# Patient Record
Sex: MALE | Race: WHITE | HISPANIC OR LATINO
[De-identification: names, ages, dates, MRNs, and addresses within clinical notes are randomized per-mention and may not be internally consistent; named-entity substitution may affect disease eponyms.]

---

## 2017-01-20 ENCOUNTER — RECORD ABSTRACTING (OUTPATIENT)
Age: 72
End: 2017-01-20

## 2017-02-17 ENCOUNTER — RESULT REVIEW (OUTPATIENT)
Age: 72
End: 2017-02-17

## 2017-02-17 ENCOUNTER — APPOINTMENT (OUTPATIENT)
Dept: INTERNAL MEDICINE | Facility: CLINIC | Age: 72
End: 2017-02-17

## 2017-02-17 VITALS
HEIGHT: 67 IN | HEART RATE: 68 BPM | BODY MASS INDEX: 26.84 KG/M2 | WEIGHT: 171 LBS | SYSTOLIC BLOOD PRESSURE: 151 MMHG | DIASTOLIC BLOOD PRESSURE: 82 MMHG

## 2017-02-17 DIAGNOSIS — A04.8 OTHER SPECIFIED BACTERIAL INTESTINAL INFECTIONS: ICD-10-CM

## 2017-02-17 DIAGNOSIS — I73.9 PERIPHERAL VASCULAR DISEASE, UNSPECIFIED: ICD-10-CM

## 2017-02-18 ENCOUNTER — RESULT REVIEW (OUTPATIENT)
Age: 72
End: 2017-02-18

## 2017-02-21 ENCOUNTER — APPOINTMENT (OUTPATIENT)
Dept: NEUROLOGY | Facility: CLINIC | Age: 72
End: 2017-02-21

## 2017-02-21 VITALS
WEIGHT: 171 LBS | BODY MASS INDEX: 26.84 KG/M2 | HEART RATE: 76 BPM | SYSTOLIC BLOOD PRESSURE: 130 MMHG | HEIGHT: 67 IN | DIASTOLIC BLOOD PRESSURE: 80 MMHG

## 2017-02-21 LAB
25(OH)D3 SERPL-MCNC: 24 NG/ML
ANION GAP SERPL CALC-SCNC: 7 MEQ/L
BUN SERPL-MCNC: 14 MG/DL
BUN/CREAT SERPL: 16.5 %
CALCIUM SERPL-MCNC: 9.5 MG/DL
CHLORIDE SERPL-SCNC: 105 MEQ/L
CHOLEST SERPL-MCNC: 238 MG/DL
CO2 SERPL-SCNC: 27 MEQ/L
CREAT SERPL-MCNC: 0.85 MG/DL
ESTIMATED AVERGAGE GLUCOSE (NORTH): 97 MG/DL
GFR SERPL CREATININE-BSD FRML MDRD: 89
GLUCOSE SERPL-MCNC: 89 MG/DL
HBA1C MFR BLD: 5 %
HDLC SERPL-MCNC: 40 MG/DL
HDLC SERPL: 5.95
LDLC SERPL DIRECT ASSAY-MCNC: 172 MG/DL
POTASSIUM SERPL-SCNC: 4.9 MMOL/L
SODIUM SERPL-SCNC: 139 MEQ/L
T4 FREE SERPL-MCNC: 1.1 NG/DL
TRIGL SERPL-MCNC: 131 MG/DL
TSH SERPL DL<=0.005 MIU/L-ACNC: 1.32 UIU/ML
VITAMIN D2 SERPL-MCNC: <4 NG/ML
VITAMIN D3 SERPL-MCNC: 24 NG/ML
VLDLC SERPL-MCNC: 26 MG/DL

## 2017-02-22 ENCOUNTER — APPOINTMENT (OUTPATIENT)
Dept: ORTHOPEDIC SURGERY | Facility: CLINIC | Age: 72
End: 2017-02-22

## 2017-02-24 ENCOUNTER — APPOINTMENT (OUTPATIENT)
Dept: INTERNAL MEDICINE | Facility: CLINIC | Age: 72
End: 2017-02-24

## 2017-02-24 ENCOUNTER — OTHER (OUTPATIENT)
Age: 72
End: 2017-02-24

## 2017-02-24 VITALS
HEART RATE: 73 BPM | SYSTOLIC BLOOD PRESSURE: 114 MMHG | DIASTOLIC BLOOD PRESSURE: 72 MMHG | WEIGHT: 171 LBS | HEIGHT: 67 IN | BODY MASS INDEX: 26.84 KG/M2

## 2017-03-08 ENCOUNTER — APPOINTMENT (OUTPATIENT)
Dept: ORTHOPEDIC SURGERY | Facility: CLINIC | Age: 72
End: 2017-03-08

## 2017-03-22 ENCOUNTER — APPOINTMENT (OUTPATIENT)
Dept: ORTHOPEDIC SURGERY | Facility: CLINIC | Age: 72
End: 2017-03-22

## 2017-04-07 ENCOUNTER — APPOINTMENT (OUTPATIENT)
Dept: INTERNAL MEDICINE | Facility: CLINIC | Age: 72
End: 2017-04-07

## 2017-04-07 VITALS
HEART RATE: 64 BPM | SYSTOLIC BLOOD PRESSURE: 123 MMHG | BODY MASS INDEX: 27.31 KG/M2 | WEIGHT: 174 LBS | DIASTOLIC BLOOD PRESSURE: 74 MMHG | HEIGHT: 67 IN

## 2017-04-25 ENCOUNTER — APPOINTMENT (OUTPATIENT)
Dept: INTERNAL MEDICINE | Facility: CLINIC | Age: 72
End: 2017-04-25

## 2017-05-03 ENCOUNTER — APPOINTMENT (OUTPATIENT)
Dept: ORTHOPEDIC SURGERY | Facility: CLINIC | Age: 72
End: 2017-05-03

## 2017-06-12 ENCOUNTER — OUTPATIENT (OUTPATIENT)
Dept: OUTPATIENT SERVICES | Facility: HOSPITAL | Age: 72
LOS: 1 days | Discharge: HOME | End: 2017-06-12

## 2017-06-12 DIAGNOSIS — M21.379 FOOT DROP, UNSPECIFIED FOOT: ICD-10-CM

## 2017-06-21 ENCOUNTER — OUTPATIENT (OUTPATIENT)
Dept: OUTPATIENT SERVICES | Facility: HOSPITAL | Age: 72
LOS: 1 days | Discharge: HOME | End: 2017-06-21

## 2017-06-21 ENCOUNTER — APPOINTMENT (OUTPATIENT)
Dept: ORTHOPEDIC SURGERY | Facility: CLINIC | Age: 72
End: 2017-06-21

## 2017-06-21 DIAGNOSIS — M21.379 FOOT DROP, UNSPECIFIED FOOT: ICD-10-CM

## 2017-06-27 ENCOUNTER — APPOINTMENT (OUTPATIENT)
Dept: NEUROLOGY | Facility: CLINIC | Age: 72
End: 2017-06-27

## 2017-06-28 DIAGNOSIS — H52.7 UNSPECIFIED DISORDER OF REFRACTION: ICD-10-CM

## 2017-06-28 DIAGNOSIS — H25.813 COMBINED FORMS OF AGE-RELATED CATARACT, BILATERAL: ICD-10-CM

## 2017-06-28 DIAGNOSIS — M25.579 PAIN IN UNSPECIFIED ANKLE AND JOINTS OF UNSPECIFIED FOOT: ICD-10-CM

## 2017-09-13 ENCOUNTER — OUTPATIENT (OUTPATIENT)
Dept: OUTPATIENT SERVICES | Facility: HOSPITAL | Age: 72
LOS: 1 days | Discharge: HOME | End: 2017-09-13

## 2017-09-13 ENCOUNTER — APPOINTMENT (OUTPATIENT)
Dept: INTERNAL MEDICINE | Facility: CLINIC | Age: 72
End: 2017-09-13

## 2017-09-13 VITALS
WEIGHT: 188 LBS | HEART RATE: 73 BPM | BODY MASS INDEX: 30.22 KG/M2 | HEIGHT: 66 IN | DIASTOLIC BLOOD PRESSURE: 77 MMHG | SYSTOLIC BLOOD PRESSURE: 117 MMHG

## 2017-09-13 DIAGNOSIS — M21.379 FOOT DROP, UNSPECIFIED FOOT: ICD-10-CM

## 2017-09-13 DIAGNOSIS — F32.2 MAJOR DEPRESSIVE DISORDER, SINGLE EPISODE, SEVERE W/OUT PSYCHOTIC FEATURES: ICD-10-CM

## 2017-09-13 DIAGNOSIS — M89.9 DISORDER OF BONE, UNSPECIFIED: ICD-10-CM

## 2017-09-13 DIAGNOSIS — B02.29 OTHER POSTHERPETIC NERVOUS SYSTEM INVOLVEMENT: ICD-10-CM

## 2017-09-14 ENCOUNTER — OUTPATIENT (OUTPATIENT)
Dept: OUTPATIENT SERVICES | Facility: HOSPITAL | Age: 72
LOS: 1 days | Discharge: HOME | End: 2017-09-14

## 2017-09-14 DIAGNOSIS — M25.511 PAIN IN RIGHT SHOULDER: ICD-10-CM

## 2017-09-14 DIAGNOSIS — M25.572 PAIN IN LEFT ANKLE AND JOINTS OF LEFT FOOT: ICD-10-CM

## 2017-09-14 DIAGNOSIS — F32.9 MAJOR DEPRESSIVE DISORDER, SINGLE EPISODE, UNSPECIFIED: ICD-10-CM

## 2017-09-14 DIAGNOSIS — F32.2 MAJOR DEPRESSIVE DISORDER, SINGLE EPISODE, SEVERE WITHOUT PSYCHOTIC FEATURES: ICD-10-CM

## 2017-09-14 DIAGNOSIS — E78.5 HYPERLIPIDEMIA, UNSPECIFIED: ICD-10-CM

## 2017-09-27 ENCOUNTER — APPOINTMENT (OUTPATIENT)
Dept: ORTHOPEDIC SURGERY | Facility: CLINIC | Age: 72
End: 2017-09-27

## 2017-09-27 ENCOUNTER — OUTPATIENT (OUTPATIENT)
Dept: OUTPATIENT SERVICES | Facility: HOSPITAL | Age: 72
LOS: 1 days | Discharge: HOME | End: 2017-09-27

## 2017-09-27 DIAGNOSIS — M21.379 FOOT DROP, UNSPECIFIED FOOT: ICD-10-CM

## 2017-10-03 ENCOUNTER — OUTPATIENT (OUTPATIENT)
Dept: OUTPATIENT SERVICES | Facility: HOSPITAL | Age: 72
LOS: 1 days | Discharge: HOME | End: 2017-10-03

## 2017-10-03 DIAGNOSIS — F33.1 MAJOR DEPRESSIVE DISORDER, RECURRENT, MODERATE: ICD-10-CM

## 2017-10-04 ENCOUNTER — OUTPATIENT (OUTPATIENT)
Dept: OUTPATIENT SERVICES | Facility: HOSPITAL | Age: 72
LOS: 1 days | Discharge: HOME | End: 2017-10-04

## 2017-10-04 DIAGNOSIS — H90.3 SENSORINEURAL HEARING LOSS, BILATERAL: ICD-10-CM

## 2017-10-04 DIAGNOSIS — M21.379 FOOT DROP, UNSPECIFIED FOOT: ICD-10-CM

## 2017-10-10 ENCOUNTER — OTHER (OUTPATIENT)
Age: 72
End: 2017-10-10

## 2017-10-10 ENCOUNTER — APPOINTMENT (OUTPATIENT)
Age: 72
End: 2017-10-10

## 2017-10-10 ENCOUNTER — APPOINTMENT (OUTPATIENT)
Dept: INTERNAL MEDICINE | Facility: CLINIC | Age: 72
End: 2017-10-10

## 2017-10-10 VITALS — WEIGHT: 185 LBS | BODY MASS INDEX: 29.86 KG/M2

## 2017-10-27 ENCOUNTER — OUTPATIENT (OUTPATIENT)
Dept: OUTPATIENT SERVICES | Facility: HOSPITAL | Age: 72
LOS: 1 days | Discharge: HOME | End: 2017-10-27

## 2017-10-27 DIAGNOSIS — M21.379 FOOT DROP, UNSPECIFIED FOOT: ICD-10-CM

## 2017-10-31 ENCOUNTER — OUTPATIENT (OUTPATIENT)
Dept: OUTPATIENT SERVICES | Facility: HOSPITAL | Age: 72
LOS: 1 days | Discharge: HOME | End: 2017-10-31

## 2017-10-31 DIAGNOSIS — H90.3 SENSORINEURAL HEARING LOSS, BILATERAL: ICD-10-CM

## 2017-10-31 DIAGNOSIS — M21.379 FOOT DROP, UNSPECIFIED FOOT: ICD-10-CM

## 2017-11-01 DIAGNOSIS — H01.001 UNSPECIFIED BLEPHARITIS RIGHT UPPER EYELID: ICD-10-CM

## 2017-11-01 DIAGNOSIS — H01.004 UNSPECIFIED BLEPHARITIS LEFT UPPER EYELID: ICD-10-CM

## 2017-11-01 DIAGNOSIS — H52.7 UNSPECIFIED DISORDER OF REFRACTION: ICD-10-CM

## 2017-11-01 DIAGNOSIS — H25.813 COMBINED FORMS OF AGE-RELATED CATARACT, BILATERAL: ICD-10-CM

## 2017-11-01 DIAGNOSIS — H01.005 UNSPECIFIED BLEPHARITIS LEFT LOWER EYELID: ICD-10-CM

## 2017-12-05 ENCOUNTER — OUTPATIENT (OUTPATIENT)
Dept: OUTPATIENT SERVICES | Facility: HOSPITAL | Age: 72
LOS: 1 days | Discharge: HOME | End: 2017-12-05

## 2017-12-05 DIAGNOSIS — M21.379 FOOT DROP, UNSPECIFIED FOOT: ICD-10-CM

## 2017-12-27 ENCOUNTER — APPOINTMENT (OUTPATIENT)
Dept: ORTHOPEDIC SURGERY | Facility: CLINIC | Age: 72
End: 2017-12-27

## 2018-01-11 ENCOUNTER — TRANSCRIPTION ENCOUNTER (OUTPATIENT)
Age: 73
End: 2018-01-11

## 2018-01-25 ENCOUNTER — APPOINTMENT (OUTPATIENT)
Dept: INTERNAL MEDICINE | Facility: CLINIC | Age: 73
End: 2018-01-25

## 2018-01-25 ENCOUNTER — OUTPATIENT (OUTPATIENT)
Dept: OUTPATIENT SERVICES | Facility: HOSPITAL | Age: 73
LOS: 1 days | Discharge: HOME | End: 2018-01-25

## 2018-01-25 VITALS
WEIGHT: 189 LBS | DIASTOLIC BLOOD PRESSURE: 82 MMHG | SYSTOLIC BLOOD PRESSURE: 150 MMHG | HEART RATE: 82 BPM | BODY MASS INDEX: 30.37 KG/M2 | HEIGHT: 66 IN

## 2018-01-25 DIAGNOSIS — F32.9 MAJOR DEPRESSIVE DISORDER, SINGLE EPISODE, UNSPECIFIED: ICD-10-CM

## 2018-01-25 DIAGNOSIS — E78.5 HYPERLIPIDEMIA, UNSPECIFIED: ICD-10-CM

## 2018-01-25 DIAGNOSIS — M25.572 PAIN IN LEFT ANKLE AND JOINTS OF LEFT FOOT: ICD-10-CM

## 2018-01-25 DIAGNOSIS — M25.511 PAIN IN RIGHT SHOULDER: ICD-10-CM

## 2018-01-25 DIAGNOSIS — T14.8XXA OTHER INJURY OF UNSPECIFIED BODY REGION, INITIAL ENCOUNTER: ICD-10-CM

## 2018-01-26 ENCOUNTER — OUTPATIENT (OUTPATIENT)
Dept: OUTPATIENT SERVICES | Facility: HOSPITAL | Age: 73
LOS: 1 days | Discharge: HOME | End: 2018-01-26

## 2018-01-26 DIAGNOSIS — H90.3 SENSORINEURAL HEARING LOSS, BILATERAL: ICD-10-CM

## 2018-01-30 ENCOUNTER — RESULT REVIEW (OUTPATIENT)
Age: 73
End: 2018-01-30

## 2018-01-30 LAB
25(OH)D3 SERPL-MCNC: 19 NG/ML
ALBUMIN SERPL-MCNC: 4.1 G/DL
ALBUMIN/GLOB SERPL: 1.58
ALP SERPL-CCNC: 92 IU/L
ALT SERPL-CCNC: 20 IU/L
ANION GAP SERPL CALC-SCNC: 7 MEQ/L
AST SERPL-CCNC: 21 IU/L
BASOPHILS # BLD: 0.05 TH/MM3
BASOPHILS NFR BLD: 0.6 %
BILIRUB SERPL-MCNC: 1 MG/DL
BUN SERPL-MCNC: 18 MG/DL
BUN/CREAT SERPL: 16.1 %
CALCIUM SERPL-MCNC: 9.1 MG/DL
CHLORIDE SERPL-SCNC: 110 MEQ/L
CHOLEST SERPL-MCNC: 269 MG/DL
CO2 SERPL-SCNC: 23 MEQ/L
CREAT SERPL-MCNC: 1.12 MG/DL
DIFFERENTIAL METHOD BLD: NORMAL
EOSINOPHIL # BLD: 0.13 TH/MM3
EOSINOPHIL NFR BLD: 1.5 %
ERYTHROCYTE [DISTWIDTH] IN BLOOD BY AUTOMATED COUNT: 15 %
ESTIMATED AVERGAGE GLUCOSE (NORTH): 100 MG/DL
GFR SERPL CREATININE-BSD FRML MDRD: 64
GLUCOSE SERPL-MCNC: 84 MG/DL
GRANULOCYTES # BLD: 5.77 TH/MM3
GRANULOCYTES NFR BLD: 64.3 %
HBA1C MFR BLD: 5.1 %
HCT VFR BLD AUTO: 55.9 %
HDLC SERPL-MCNC: 42 MG/DL
HDLC SERPL: 6.4
HGB BLD-MCNC: 18 G/DL
IMM GRANULOCYTES # BLD: 0.05 TH/MM3
IMM GRANULOCYTES NFR BLD: 0.6 %
LDLC SERPL DIRECT ASSAY-MCNC: 205 MG/DL
LYMPHOCYTES # BLD: 2.3 TH/MM3
LYMPHOCYTES NFR BLD: 25.7 %
MCH RBC QN AUTO: 29 PG
MCHC RBC AUTO-ENTMCNC: 32.9 G/DL
MCV RBC AUTO: 88.2 FL
MONOCYTES # BLD: 0.65 TH/MM3
MONOCYTES NFR BLD: 7.3 %
PLATELET # BLD: 167 TH/MM3
PMV BLD AUTO: 11.1 FL
POTASSIUM SERPL-SCNC: 4 MMOL/L
PROT SERPL-MCNC: 6.7 G/DL
RBC # BLD AUTO: 6.34 MIL/MM3
SODIUM SERPL-SCNC: 140 MEQ/L
TRIGL SERPL-MCNC: 88 MG/DL
TSH SERPL DL<=0.005 MIU/L-ACNC: 1.66 UIU/ML
VITAMIN D2 SERPL-MCNC: <4 NG/ML
VITAMIN D3 SERPL-MCNC: 19 NG/ML
VLDLC SERPL-MCNC: 17 MG/DL
WBC # BLD: 8.95 TH/MM3

## 2018-02-01 ENCOUNTER — OTHER (OUTPATIENT)
Age: 73
End: 2018-02-01

## 2018-02-01 ENCOUNTER — OUTPATIENT (OUTPATIENT)
Dept: OUTPATIENT SERVICES | Facility: HOSPITAL | Age: 73
LOS: 1 days | Discharge: HOME | End: 2018-02-01

## 2018-02-01 DIAGNOSIS — H90.3 SENSORINEURAL HEARING LOSS, BILATERAL: ICD-10-CM

## 2018-02-04 DIAGNOSIS — M21.379 FOOT DROP, UNSPECIFIED FOOT: ICD-10-CM

## 2018-02-22 ENCOUNTER — OUTPATIENT (OUTPATIENT)
Dept: OUTPATIENT SERVICES | Facility: HOSPITAL | Age: 73
LOS: 1 days | Discharge: HOME | End: 2018-02-22

## 2018-02-23 DIAGNOSIS — H90.3 SENSORINEURAL HEARING LOSS, BILATERAL: ICD-10-CM

## 2018-03-06 ENCOUNTER — OUTPATIENT (OUTPATIENT)
Dept: OUTPATIENT SERVICES | Facility: HOSPITAL | Age: 73
LOS: 1 days | Discharge: HOME | End: 2018-03-06

## 2018-03-06 DIAGNOSIS — S43.401A UNSPECIFIED SPRAIN OF RIGHT SHOULDER JOINT, INITIAL ENCOUNTER: ICD-10-CM

## 2018-03-08 ENCOUNTER — OUTPATIENT (OUTPATIENT)
Dept: OUTPATIENT SERVICES | Facility: HOSPITAL | Age: 73
LOS: 1 days | Discharge: HOME | End: 2018-03-08

## 2018-03-08 DIAGNOSIS — H90.3 SENSORINEURAL HEARING LOSS, BILATERAL: ICD-10-CM

## 2018-03-16 ENCOUNTER — OUTPATIENT (OUTPATIENT)
Dept: OUTPATIENT SERVICES | Facility: HOSPITAL | Age: 73
LOS: 1 days | Discharge: HOME | End: 2018-03-16

## 2018-03-16 ENCOUNTER — APPOINTMENT (OUTPATIENT)
Dept: INTERNAL MEDICINE | Facility: CLINIC | Age: 73
End: 2018-03-16

## 2018-03-16 VITALS
WEIGHT: 184 LBS | SYSTOLIC BLOOD PRESSURE: 143 MMHG | BODY MASS INDEX: 29.57 KG/M2 | HEIGHT: 66 IN | DIASTOLIC BLOOD PRESSURE: 84 MMHG | HEART RATE: 80 BPM

## 2018-03-16 RX ORDER — DICLOFENAC SODIUM 50 MG/1
50 TABLET, DELAYED RELEASE ORAL
Qty: 30 | Refills: 2 | Status: COMPLETED | COMMUNITY
Start: 2017-09-13 | End: 2018-01-16

## 2018-03-16 RX ORDER — PREGABALIN 150 MG/1
150 CAPSULE ORAL
Qty: 60 | Refills: 1 | Status: COMPLETED | COMMUNITY
Start: 2017-02-24 | End: 2017-06-23

## 2018-03-20 ENCOUNTER — OUTPATIENT (OUTPATIENT)
Dept: OUTPATIENT SERVICES | Facility: HOSPITAL | Age: 73
LOS: 1 days | Discharge: HOME | End: 2018-03-20

## 2018-03-20 DIAGNOSIS — M25.572 PAIN IN LEFT ANKLE AND JOINTS OF LEFT FOOT: ICD-10-CM

## 2018-03-20 DIAGNOSIS — Z63.4 DISAPPEARANCE AND DEATH OF FAMILY MEMBER: ICD-10-CM

## 2018-03-20 DIAGNOSIS — E78.5 HYPERLIPIDEMIA, UNSPECIFIED: ICD-10-CM

## 2018-03-20 DIAGNOSIS — F32.9 MAJOR DEPRESSIVE DISORDER, SINGLE EPISODE, UNSPECIFIED: ICD-10-CM

## 2018-03-20 DIAGNOSIS — Z56.0 UNEMPLOYMENT, UNSPECIFIED: ICD-10-CM

## 2018-03-20 SDOH — SOCIAL STABILITY - SOCIAL INSECURITY: DISSAPEARANCE AND DEATH OF FAMILY MEMBER: Z63.4

## 2018-03-20 SDOH — ECONOMIC STABILITY - INCOME SECURITY: UNEMPLOYMENT, UNSPECIFIED: Z56.0

## 2018-04-03 ENCOUNTER — OUTPATIENT (OUTPATIENT)
Dept: OUTPATIENT SERVICES | Facility: HOSPITAL | Age: 73
LOS: 1 days | Discharge: HOME | End: 2018-04-03

## 2018-04-03 DIAGNOSIS — F41.1 GENERALIZED ANXIETY DISORDER: ICD-10-CM

## 2018-04-03 DIAGNOSIS — F33.1 MAJOR DEPRESSIVE DISORDER, RECURRENT, MODERATE: ICD-10-CM

## 2018-04-04 ENCOUNTER — APPOINTMENT (OUTPATIENT)
Dept: ORTHOPEDIC SURGERY | Facility: CLINIC | Age: 73
End: 2018-04-04

## 2018-04-11 ENCOUNTER — APPOINTMENT (OUTPATIENT)
Dept: ORTHOPEDIC SURGERY | Facility: CLINIC | Age: 73
End: 2018-04-11

## 2018-04-11 ENCOUNTER — OUTPATIENT (OUTPATIENT)
Dept: OUTPATIENT SERVICES | Facility: HOSPITAL | Age: 73
LOS: 1 days | Discharge: HOME | End: 2018-04-11

## 2018-04-26 ENCOUNTER — OTHER (OUTPATIENT)
Age: 73
End: 2018-04-26

## 2018-05-10 ENCOUNTER — OUTPATIENT (OUTPATIENT)
Dept: OUTPATIENT SERVICES | Facility: HOSPITAL | Age: 73
LOS: 1 days | Discharge: HOME | End: 2018-05-10

## 2018-05-10 ENCOUNTER — APPOINTMENT (OUTPATIENT)
Dept: INTERNAL MEDICINE | Facility: CLINIC | Age: 73
End: 2018-05-10

## 2018-05-10 VITALS
BODY MASS INDEX: 30.05 KG/M2 | WEIGHT: 187 LBS | SYSTOLIC BLOOD PRESSURE: 135 MMHG | HEIGHT: 66 IN | DIASTOLIC BLOOD PRESSURE: 78 MMHG | HEART RATE: 93 BPM

## 2018-05-10 DIAGNOSIS — M25.511 PAIN IN RIGHT SHOULDER: ICD-10-CM

## 2018-05-10 DIAGNOSIS — F41.1 GENERALIZED ANXIETY DISORDER: ICD-10-CM

## 2018-05-10 DIAGNOSIS — M54.12 RADICULOPATHY, CERVICAL REGION: ICD-10-CM

## 2018-05-10 DIAGNOSIS — M25.579 PAIN IN UNSPECIFIED ANKLE AND JOINTS OF UNSPECIFIED FOOT: ICD-10-CM

## 2018-05-11 ENCOUNTER — OUTPATIENT (OUTPATIENT)
Dept: OUTPATIENT SERVICES | Facility: HOSPITAL | Age: 73
LOS: 1 days | Discharge: HOME | End: 2018-05-11

## 2018-05-11 ENCOUNTER — LABORATORY RESULT (OUTPATIENT)
Age: 73
End: 2018-05-11

## 2018-05-11 DIAGNOSIS — M25.511 PAIN IN RIGHT SHOULDER: ICD-10-CM

## 2018-05-11 DIAGNOSIS — F32.9 MAJOR DEPRESSIVE DISORDER, SINGLE EPISODE, UNSPECIFIED: ICD-10-CM

## 2018-05-11 DIAGNOSIS — M89.9 DISORDER OF BONE, UNSPECIFIED: ICD-10-CM

## 2018-05-11 DIAGNOSIS — E78.5 HYPERLIPIDEMIA, UNSPECIFIED: ICD-10-CM

## 2018-05-14 LAB
25(OH)D3 SERPL-MCNC: 39 NG/ML
ALBUMIN SERPL ELPH-MCNC: 4.5 G/DL
ALP BLD-CCNC: 106 U/L
ALT SERPL-CCNC: 16 U/L
ANION GAP SERPL CALC-SCNC: 14 MMOL/L
APPEARANCE: CLEAR
AST SERPL-CCNC: 19 U/L
BACTERIA: NEGATIVE
BILIRUB SERPL-MCNC: 1.2 MG/DL
BILIRUBIN URINE: NEGATIVE
BLOOD URINE: NEGATIVE
BUN SERPL-MCNC: 17 MG/DL
CALCIUM SERPL-MCNC: 9.5 MG/DL
CHLORIDE SERPL-SCNC: 104 MMOL/L
CHOLEST SERPL-MCNC: 132 MG/DL
CHOLEST/HDLC SERPL: 3 RATIO
CO2 SERPL-SCNC: 27 MMOL/L
COLOR: ABNORMAL
CREAT SERPL-MCNC: 1.1 MG/DL
GLUCOSE QUALITATIVE U: NEGATIVE MG/DL
GLUCOSE SERPL-MCNC: 85 MG/DL
HDLC SERPL-MCNC: 44 MG/DL
HYALINE CASTS: 0 /LPF
KETONES URINE: NEGATIVE
LDLC SERPL CALC-MCNC: 78 MG/DL
LEUKOCYTE ESTERASE URINE: NEGATIVE
MICROSCOPIC-UA: NORMAL
NITRITE URINE: NEGATIVE
PH URINE: 7.5
POTASSIUM SERPL-SCNC: 4.5 MMOL/L
PROT SERPL-MCNC: 7.3 G/DL
PROTEIN URINE: ABNORMAL MG/DL
PSA FREE FLD-MCNC: 25.6
PSA FREE SERPL-MCNC: 0.53 NG/ML
PSA SERPL-MCNC: 2.07 NG/ML
RED BLOOD CELLS URINE: 0 /HPF
SODIUM SERPL-SCNC: 145 MMOL/L
SPECIFIC GRAVITY URINE: 1.03
SQUAMOUS EPITHELIAL CELLS: 1 /HPF
TRIGL SERPL-MCNC: 80 MG/DL
UROBILINOGEN URINE: 1 MG/DL
WHITE BLOOD CELLS URINE: 0 /HPF

## 2018-06-26 ENCOUNTER — OUTPATIENT (OUTPATIENT)
Dept: OUTPATIENT SERVICES | Facility: HOSPITAL | Age: 73
LOS: 1 days | Discharge: HOME | End: 2018-06-26

## 2018-08-07 ENCOUNTER — OUTPATIENT (OUTPATIENT)
Dept: OUTPATIENT SERVICES | Facility: HOSPITAL | Age: 73
LOS: 1 days | Discharge: HOME | End: 2018-08-07

## 2018-08-16 ENCOUNTER — OUTPATIENT (OUTPATIENT)
Dept: OUTPATIENT SERVICES | Facility: HOSPITAL | Age: 73
LOS: 1 days | Discharge: HOME | End: 2018-08-16

## 2018-08-16 ENCOUNTER — APPOINTMENT (OUTPATIENT)
Dept: UROLOGY | Facility: CLINIC | Age: 73
End: 2018-08-16
Payer: MEDICAID

## 2018-08-16 VITALS
WEIGHT: 182 LBS | SYSTOLIC BLOOD PRESSURE: 133 MMHG | HEART RATE: 71 BPM | HEIGHT: 66 IN | DIASTOLIC BLOOD PRESSURE: 69 MMHG | BODY MASS INDEX: 29.25 KG/M2

## 2018-08-16 PROCEDURE — 99214 OFFICE O/P EST MOD 30 MIN: CPT

## 2018-08-17 ENCOUNTER — LABORATORY RESULT (OUTPATIENT)
Age: 73
End: 2018-08-17

## 2018-08-17 DIAGNOSIS — R32 UNSPECIFIED URINARY INCONTINENCE: ICD-10-CM

## 2018-08-20 ENCOUNTER — OUTPATIENT (OUTPATIENT)
Dept: OUTPATIENT SERVICES | Facility: HOSPITAL | Age: 73
LOS: 1 days | Discharge: HOME | End: 2018-08-20

## 2018-08-20 DIAGNOSIS — R32 UNSPECIFIED URINARY INCONTINENCE: ICD-10-CM

## 2018-08-23 LAB
PSA FREE FLD-MCNC: 21
PSA FREE SERPL-MCNC: 0.53 NG/ML
PSA SERPL-MCNC: 2.52 NG/ML

## 2018-08-30 ENCOUNTER — RX RENEWAL (OUTPATIENT)
Age: 73
End: 2018-08-30

## 2018-09-07 NOTE — ASU PATIENT PROFILE, ADULT - ABILITY TO HEAR (WITH HEARING AID OR HEARING APPLIANCE IF NORMALLY USED):
PT DAILY TREATMENT NOTE - [facility] 2-15    Patient Name: Syd Dominguez  Mildly to Moderately Impaired: difficulty hearing in some environments or speaker may need to increase volume or speak distinctly

## 2018-09-07 NOTE — ASU PATIENT PROFILE, ADULT - PMH
Acute depression    Chronic low back pain with sciatica    History of hyperlipidemia    HTN (hypertension)

## 2018-09-10 ENCOUNTER — OUTPATIENT (OUTPATIENT)
Dept: OUTPATIENT SERVICES | Facility: HOSPITAL | Age: 73
LOS: 1 days | Discharge: HOME | End: 2018-09-10

## 2018-09-10 VITALS — DIASTOLIC BLOOD PRESSURE: 78 MMHG | SYSTOLIC BLOOD PRESSURE: 150 MMHG | HEART RATE: 78 BPM

## 2018-09-10 VITALS
RESPIRATION RATE: 17 BRPM | HEIGHT: 65 IN | HEART RATE: 88 BPM | DIASTOLIC BLOOD PRESSURE: 82 MMHG | SYSTOLIC BLOOD PRESSURE: 143 MMHG | OXYGEN SATURATION: 98 % | TEMPERATURE: 96 F | WEIGHT: 175.05 LBS

## 2018-09-10 DIAGNOSIS — Z87.828 PERSONAL HISTORY OF OTHER (HEALED) PHYSICAL INJURY AND TRAUMA: Chronic | ICD-10-CM

## 2018-09-10 RX ORDER — ACETAMINOPHEN 500 MG
650 TABLET ORAL ONCE
Qty: 0 | Refills: 0 | Status: DISCONTINUED | OUTPATIENT
Start: 2018-09-10 | End: 2018-09-25

## 2018-09-10 RX ORDER — SODIUM CHLORIDE 9 MG/ML
1000 INJECTION, SOLUTION INTRAVENOUS
Qty: 0 | Refills: 0 | Status: DISCONTINUED | OUTPATIENT
Start: 2018-09-10 | End: 2018-09-25

## 2018-09-11 ENCOUNTER — RX RENEWAL (OUTPATIENT)
Age: 73
End: 2018-09-11

## 2018-09-12 DIAGNOSIS — H26.9 UNSPECIFIED CATARACT: ICD-10-CM

## 2018-09-12 DIAGNOSIS — I10 ESSENTIAL (PRIMARY) HYPERTENSION: ICD-10-CM

## 2018-09-24 ENCOUNTER — APPOINTMENT (OUTPATIENT)
Dept: INTERNAL MEDICINE | Facility: CLINIC | Age: 73
End: 2018-09-24

## 2018-09-24 ENCOUNTER — OUTPATIENT (OUTPATIENT)
Dept: OUTPATIENT SERVICES | Facility: HOSPITAL | Age: 73
LOS: 1 days | Discharge: HOME | End: 2018-09-24

## 2018-09-24 VITALS
HEART RATE: 76 BPM | WEIGHT: 186 LBS | BODY MASS INDEX: 29.89 KG/M2 | DIASTOLIC BLOOD PRESSURE: 80 MMHG | HEIGHT: 66 IN | SYSTOLIC BLOOD PRESSURE: 122 MMHG | TEMPERATURE: 98 F

## 2018-09-24 DIAGNOSIS — Z87.828 PERSONAL HISTORY OF OTHER (HEALED) PHYSICAL INJURY AND TRAUMA: Chronic | ICD-10-CM

## 2018-09-24 PROBLEM — I10 ESSENTIAL (PRIMARY) HYPERTENSION: Chronic | Status: ACTIVE | Noted: 2018-09-10

## 2018-09-24 PROBLEM — F32.9 MAJOR DEPRESSIVE DISORDER, SINGLE EPISODE, UNSPECIFIED: Chronic | Status: ACTIVE | Noted: 2018-09-10

## 2018-09-24 PROBLEM — Z86.39 PERSONAL HISTORY OF OTHER ENDOCRINE, NUTRITIONAL AND METABOLIC DISEASE: Chronic | Status: ACTIVE | Noted: 2018-09-10

## 2018-09-24 PROBLEM — M54.40 LUMBAGO WITH SCIATICA, UNSPECIFIED SIDE: Chronic | Status: ACTIVE | Noted: 2018-09-10

## 2018-09-24 NOTE — PLAN
[FreeTextEntry1] : 74 yo M with PMH listed who presented to the clinic today for follow up. Patient states he is in good health and feels well. Patient today is requesting flu shot and medication refills. Otherwise he states he has not other needs or complaints.\par \par IMPRESSION:\par \par # Depression: \par - Continue Setraline\par \par # OA :\par - Continue Diclofenac gel\par \par # DLD:\par - Continue Atorvastatin \par \par # Urinary Incontinence:\par - Continue Urology follow up\par - Continue Diapers; supply given for 30 days\par \par # HCM:\par - Routine labs\par - Colonoscopy up to date\par - flu shot given\par \par - RTC in 3 months

## 2018-09-24 NOTE — PHYSICAL EXAM
[No Acute Distress] : no acute distress [Well Nourished] : well nourished [Well Developed] : well developed [Well-Appearing] : well-appearing [Normal Sclera/Conjunctiva] : normal sclera/conjunctiva [Normal Outer Ear/Nose] : the outer ears and nose were normal in appearance [No Respiratory Distress] : no respiratory distress  [Clear to Auscultation] : lungs were clear to auscultation bilaterally [No Accessory Muscle Use] : no accessory muscle use [Normal Rate] : normal rate  [Regular Rhythm] : with a regular rhythm [Normal S1, S2] : normal S1 and S2 [Soft] : abdomen soft [Non Tender] : non-tender [Non-distended] : non-distended [Normal Affect] : the affect was normal

## 2018-09-24 NOTE — HISTORY OF PRESENT ILLNESS
[de-identified] : Patient is a 74 yo M with PMH listed who presented to the clinic today for follow up. Patient states he is in good health and feels well. Patient today is requesting flu shot and medication refills. Otherwise he states he has not other needs or complaints. coagulation(Bleeding disorder R/T clinical cond/anti-coags)

## 2018-09-24 NOTE — REVIEW OF SYSTEMS
[Incontinence] : incontinence [Joint Pain] : joint pain [Fever] : no fever [Chills] : no chills [Fatigue] : no fatigue [Night Sweats] : no night sweats [Vision Problems] : no vision problems [Earache] : no earache [Chest Pain] : no chest pain [Palpitations] : no palpitations [Lower Ext Edema] : no lower extremity edema [Shortness Of Breath] : no shortness of breath [Wheezing] : no wheezing [Cough] : no cough [Abdominal Pain] : no abdominal pain [Nausea] : no nausea [Constipation] : no constipation [Diarrhea] : no diarrhea [Vomiting] : no vomiting [Heartburn] : no heartburn [Dysuria] : no dysuria [Frequency] : no frequency [Skin Rash] : no skin rash [Headache] : no headache [Dizziness] : no dizziness

## 2018-09-25 ENCOUNTER — OUTPATIENT (OUTPATIENT)
Dept: OUTPATIENT SERVICES | Facility: HOSPITAL | Age: 73
LOS: 1 days | Discharge: HOME | End: 2018-09-25

## 2018-09-25 DIAGNOSIS — Z87.828 PERSONAL HISTORY OF OTHER (HEALED) PHYSICAL INJURY AND TRAUMA: Chronic | ICD-10-CM

## 2018-09-26 DIAGNOSIS — E78.5 HYPERLIPIDEMIA, UNSPECIFIED: ICD-10-CM

## 2018-09-26 DIAGNOSIS — Z23 ENCOUNTER FOR IMMUNIZATION: ICD-10-CM

## 2018-09-26 DIAGNOSIS — F32.9 MAJOR DEPRESSIVE DISORDER, SINGLE EPISODE, UNSPECIFIED: ICD-10-CM

## 2018-09-28 ENCOUNTER — LABORATORY RESULT (OUTPATIENT)
Age: 73
End: 2018-09-28

## 2018-09-29 LAB
25(OH)D3 SERPL-MCNC: 35 NG/ML
ALBUMIN SERPL ELPH-MCNC: 4.4 G/DL
ALP BLD-CCNC: 96 U/L
ALT SERPL-CCNC: 16 U/L
ANION GAP SERPL CALC-SCNC: 10 MMOL/L
AST SERPL-CCNC: 20 U/L
BASOPHILS # BLD AUTO: 0.11 K/UL
BASOPHILS NFR BLD AUTO: 1.2 %
BILIRUB SERPL-MCNC: 0.8 MG/DL
BUN SERPL-MCNC: 15 MG/DL
CALCIUM SERPL-MCNC: 9.2 MG/DL
CHLORIDE SERPL-SCNC: 106 MMOL/L
CHOLEST SERPL-MCNC: 118 MG/DL
CHOLEST/HDLC SERPL: 3 RATIO
CO2 SERPL-SCNC: 27 MMOL/L
CREAT SERPL-MCNC: 1 MG/DL
EOSINOPHIL # BLD AUTO: 0.27 K/UL
EOSINOPHIL NFR BLD AUTO: 3 %
GLUCOSE SERPL-MCNC: 91 MG/DL
HCT VFR BLD CALC: 53.4 %
HDLC SERPL-MCNC: 39 MG/DL
HGB BLD-MCNC: 17.4 G/DL
IMM GRANULOCYTES NFR BLD AUTO: 0.3 %
LDLC SERPL CALC-MCNC: 73 MG/DL
LYMPHOCYTES # BLD AUTO: 2.96 K/UL
LYMPHOCYTES NFR BLD AUTO: 32.7 %
MAN DIFF?: NORMAL
MCHC RBC-ENTMCNC: 30 PG
MCHC RBC-ENTMCNC: 32.6 G/DL
MCV RBC AUTO: 92.1 FL
MONOCYTES # BLD AUTO: 0.56 K/UL
MONOCYTES NFR BLD AUTO: 6.2 %
NEUTROPHILS # BLD AUTO: 5.11 K/UL
NEUTROPHILS NFR BLD AUTO: 56.6 %
PLATELET # BLD AUTO: 172 K/UL
POTASSIUM SERPL-SCNC: 5 MMOL/L
PROT SERPL-MCNC: 7 G/DL
RBC # BLD: 5.8 M/UL
RBC # FLD: 13.2 %
SODIUM SERPL-SCNC: 143 MMOL/L
TRIGL SERPL-MCNC: 103 MG/DL
TSH SERPL-ACNC: 1.51 UIU/ML
WBC # FLD AUTO: 9.04 K/UL

## 2018-10-04 ENCOUNTER — OUTPATIENT (OUTPATIENT)
Dept: OUTPATIENT SERVICES | Facility: HOSPITAL | Age: 73
LOS: 1 days | Discharge: HOME | End: 2018-10-04

## 2018-10-04 ENCOUNTER — APPOINTMENT (OUTPATIENT)
Dept: UROLOGY | Facility: CLINIC | Age: 73
End: 2018-10-04
Payer: MEDICAID

## 2018-10-04 VITALS
SYSTOLIC BLOOD PRESSURE: 131 MMHG | WEIGHT: 186 LBS | DIASTOLIC BLOOD PRESSURE: 68 MMHG | HEART RATE: 59 BPM | HEIGHT: 66 IN | BODY MASS INDEX: 29.89 KG/M2

## 2018-10-04 DIAGNOSIS — R53.83 OTHER FATIGUE: ICD-10-CM

## 2018-10-04 DIAGNOSIS — Z87.828 PERSONAL HISTORY OF OTHER (HEALED) PHYSICAL INJURY AND TRAUMA: Chronic | ICD-10-CM

## 2018-10-04 PROCEDURE — 99214 OFFICE O/P EST MOD 30 MIN: CPT

## 2018-10-05 ENCOUNTER — EMERGENCY (EMERGENCY)
Facility: HOSPITAL | Age: 73
LOS: 0 days | Discharge: HOME | End: 2018-10-05
Attending: EMERGENCY MEDICINE | Admitting: STUDENT IN AN ORGANIZED HEALTH CARE EDUCATION/TRAINING PROGRAM

## 2018-10-05 VITALS
DIASTOLIC BLOOD PRESSURE: 84 MMHG | RESPIRATION RATE: 18 BRPM | SYSTOLIC BLOOD PRESSURE: 142 MMHG | TEMPERATURE: 96 F | OXYGEN SATURATION: 97 % | HEART RATE: 75 BPM

## 2018-10-05 VITALS
SYSTOLIC BLOOD PRESSURE: 176 MMHG | TEMPERATURE: 97 F | DIASTOLIC BLOOD PRESSURE: 88 MMHG | HEART RATE: 65 BPM | OXYGEN SATURATION: 95 % | RESPIRATION RATE: 20 BRPM

## 2018-10-05 DIAGNOSIS — Z87.891 PERSONAL HISTORY OF NICOTINE DEPENDENCE: ICD-10-CM

## 2018-10-05 DIAGNOSIS — Z87.828 PERSONAL HISTORY OF OTHER (HEALED) PHYSICAL INJURY AND TRAUMA: Chronic | ICD-10-CM

## 2018-10-05 DIAGNOSIS — R11.2 NAUSEA WITH VOMITING, UNSPECIFIED: ICD-10-CM

## 2018-10-05 DIAGNOSIS — R10.84 GENERALIZED ABDOMINAL PAIN: ICD-10-CM

## 2018-10-05 DIAGNOSIS — R53.1 WEAKNESS: ICD-10-CM

## 2018-10-05 DIAGNOSIS — Z79.899 OTHER LONG TERM (CURRENT) DRUG THERAPY: ICD-10-CM

## 2018-10-05 DIAGNOSIS — R06.02 SHORTNESS OF BREATH: ICD-10-CM

## 2018-10-05 DIAGNOSIS — R61 GENERALIZED HYPERHIDROSIS: ICD-10-CM

## 2018-10-05 DIAGNOSIS — E78.00 PURE HYPERCHOLESTEROLEMIA, UNSPECIFIED: ICD-10-CM

## 2018-10-05 DIAGNOSIS — R42 DIZZINESS AND GIDDINESS: ICD-10-CM

## 2018-10-05 LAB
ALBUMIN SERPL ELPH-MCNC: 4.5 G/DL — SIGNIFICANT CHANGE UP (ref 3.5–5.2)
ALP SERPL-CCNC: 102 U/L — SIGNIFICANT CHANGE UP (ref 30–115)
ALT FLD-CCNC: 18 U/L — SIGNIFICANT CHANGE UP (ref 0–41)
ANION GAP SERPL CALC-SCNC: 17 MMOL/L — HIGH (ref 7–14)
APPEARANCE UR: CLEAR — SIGNIFICANT CHANGE UP
AST SERPL-CCNC: 23 U/L — SIGNIFICANT CHANGE UP (ref 0–41)
BASE EXCESS BLDV CALC-SCNC: -2.1 MMOL/L — LOW (ref -2–2)
BASOPHILS # BLD AUTO: 0.11 K/UL — SIGNIFICANT CHANGE UP (ref 0–0.2)
BASOPHILS NFR BLD AUTO: 0.9 % — SIGNIFICANT CHANGE UP (ref 0–1)
BILIRUB SERPL-MCNC: 0.7 MG/DL — SIGNIFICANT CHANGE UP (ref 0.2–1.2)
BILIRUB UR-MCNC: NEGATIVE — SIGNIFICANT CHANGE UP
BUN SERPL-MCNC: 17 MG/DL — SIGNIFICANT CHANGE UP (ref 10–20)
CA-I SERPL-SCNC: 1.17 MMOL/L — SIGNIFICANT CHANGE UP (ref 1.12–1.3)
CALCIUM SERPL-MCNC: 9.3 MG/DL — SIGNIFICANT CHANGE UP (ref 8.5–10.1)
CHLORIDE SERPL-SCNC: 99 MMOL/L — SIGNIFICANT CHANGE UP (ref 98–110)
CO2 SERPL-SCNC: 23 MMOL/L — SIGNIFICANT CHANGE UP (ref 17–32)
COLOR SPEC: YELLOW — SIGNIFICANT CHANGE UP
CREAT SERPL-MCNC: 1 MG/DL — SIGNIFICANT CHANGE UP (ref 0.7–1.5)
DIFF PNL FLD: NEGATIVE — SIGNIFICANT CHANGE UP
EOSINOPHIL # BLD AUTO: 0.22 K/UL — SIGNIFICANT CHANGE UP (ref 0–0.7)
EOSINOPHIL NFR BLD AUTO: 1.7 % — SIGNIFICANT CHANGE UP (ref 0–8)
GAS PNL BLDV: 138 MMOL/L — SIGNIFICANT CHANGE UP (ref 136–145)
GAS PNL BLDV: SIGNIFICANT CHANGE UP
GLUCOSE SERPL-MCNC: 131 MG/DL — HIGH (ref 70–99)
GLUCOSE UR QL: NEGATIVE MG/DL — SIGNIFICANT CHANGE UP
HCO3 BLDV-SCNC: 27 MMOL/L — SIGNIFICANT CHANGE UP (ref 22–29)
HCT VFR BLD CALC: 51.6 % — SIGNIFICANT CHANGE UP (ref 42–52)
HCT VFR BLDA CALC: 56.1 % — HIGH (ref 34–44)
HGB BLD CALC-MCNC: 18.3 G/DL — HIGH (ref 14–18)
HGB BLD-MCNC: 17 G/DL — SIGNIFICANT CHANGE UP (ref 14–18)
IMM GRANULOCYTES NFR BLD AUTO: 0.5 % — HIGH (ref 0.1–0.3)
KETONES UR-MCNC: NEGATIVE — SIGNIFICANT CHANGE UP
LACTATE BLDV-MCNC: 1.3 MMOL/L — SIGNIFICANT CHANGE UP (ref 0.5–1.6)
LEUKOCYTE ESTERASE UR-ACNC: NEGATIVE — SIGNIFICANT CHANGE UP
LIDOCAIN IGE QN: 17 U/L — SIGNIFICANT CHANGE UP (ref 7–60)
LYMPHOCYTES # BLD AUTO: 26.5 % — SIGNIFICANT CHANGE UP (ref 20.5–51.1)
LYMPHOCYTES # BLD AUTO: 3.39 K/UL — SIGNIFICANT CHANGE UP (ref 1.2–3.4)
MCHC RBC-ENTMCNC: 29.4 PG — SIGNIFICANT CHANGE UP (ref 27–31)
MCHC RBC-ENTMCNC: 32.9 G/DL — SIGNIFICANT CHANGE UP (ref 32–37)
MCV RBC AUTO: 89.3 FL — SIGNIFICANT CHANGE UP (ref 80–94)
MONOCYTES # BLD AUTO: 0.65 K/UL — HIGH (ref 0.1–0.6)
MONOCYTES NFR BLD AUTO: 5.1 % — SIGNIFICANT CHANGE UP (ref 1.7–9.3)
NEUTROPHILS # BLD AUTO: 8.34 K/UL — HIGH (ref 1.4–6.5)
NEUTROPHILS NFR BLD AUTO: 65.3 % — SIGNIFICANT CHANGE UP (ref 42.2–75.2)
NITRITE UR-MCNC: NEGATIVE — SIGNIFICANT CHANGE UP
NRBC # BLD: 0 /100 WBCS — SIGNIFICANT CHANGE UP (ref 0–0)
PCO2 BLDV: 62 MMHG — HIGH (ref 41–51)
PH BLDV: 7.25 — LOW (ref 7.26–7.43)
PH UR: 5.5 — SIGNIFICANT CHANGE UP (ref 5–8)
PLATELET # BLD AUTO: 182 K/UL — SIGNIFICANT CHANGE UP (ref 130–400)
PO2 BLDV: 32 MMHG — SIGNIFICANT CHANGE UP (ref 20–40)
POTASSIUM BLDV-SCNC: 3.8 MMOL/L — SIGNIFICANT CHANGE UP (ref 3.3–5.6)
POTASSIUM SERPL-MCNC: 4 MMOL/L — SIGNIFICANT CHANGE UP (ref 3.5–5)
POTASSIUM SERPL-SCNC: 4 MMOL/L — SIGNIFICANT CHANGE UP (ref 3.5–5)
PROT SERPL-MCNC: 7.2 G/DL — SIGNIFICANT CHANGE UP (ref 6–8)
PROT UR-MCNC: ABNORMAL MG/DL
RBC # BLD: 5.78 M/UL — SIGNIFICANT CHANGE UP (ref 4.7–6.1)
RBC # FLD: 13 % — SIGNIFICANT CHANGE UP (ref 11.5–14.5)
SAO2 % BLDV: 56 % — SIGNIFICANT CHANGE UP
SODIUM SERPL-SCNC: 139 MMOL/L — SIGNIFICANT CHANGE UP (ref 135–146)
SP GR SPEC: 1.02 — SIGNIFICANT CHANGE UP (ref 1.01–1.03)
TROPONIN T SERPL-MCNC: <0.01 NG/ML — SIGNIFICANT CHANGE UP
UROBILINOGEN FLD QL: 0.2 MG/DL — SIGNIFICANT CHANGE UP (ref 0.2–0.2)
WBC # BLD: 12.77 K/UL — HIGH (ref 4.8–10.8)
WBC # FLD AUTO: 12.77 K/UL — HIGH (ref 4.8–10.8)
WBC UR QL: SIGNIFICANT CHANGE UP /HPF

## 2018-10-05 RX ORDER — ONDANSETRON 8 MG/1
4 TABLET, FILM COATED ORAL ONCE
Qty: 0 | Refills: 0 | Status: COMPLETED | OUTPATIENT
Start: 2018-10-05 | End: 2018-10-05

## 2018-10-05 RX ORDER — IPRATROPIUM/ALBUTEROL SULFATE 18-103MCG
3 AEROSOL WITH ADAPTER (GRAM) INHALATION ONCE
Qty: 0 | Refills: 0 | Status: COMPLETED | OUTPATIENT
Start: 2018-10-05 | End: 2018-10-05

## 2018-10-05 RX ORDER — FAMOTIDINE 10 MG/ML
20 INJECTION INTRAVENOUS ONCE
Qty: 0 | Refills: 0 | Status: COMPLETED | OUTPATIENT
Start: 2018-10-05 | End: 2018-10-05

## 2018-10-05 RX ORDER — SODIUM CHLORIDE 9 MG/ML
3 INJECTION INTRAMUSCULAR; INTRAVENOUS; SUBCUTANEOUS ONCE
Qty: 0 | Refills: 0 | Status: COMPLETED | OUTPATIENT
Start: 2018-10-05 | End: 2018-10-05

## 2018-10-05 RX ORDER — SODIUM CHLORIDE 9 MG/ML
1000 INJECTION INTRAMUSCULAR; INTRAVENOUS; SUBCUTANEOUS ONCE
Qty: 0 | Refills: 0 | Status: COMPLETED | OUTPATIENT
Start: 2018-10-05 | End: 2018-10-05

## 2018-10-05 RX ADMIN — FAMOTIDINE 20 MILLIGRAM(S): 10 INJECTION INTRAVENOUS at 05:04

## 2018-10-05 RX ADMIN — SODIUM CHLORIDE 3 MILLILITER(S): 9 INJECTION INTRAMUSCULAR; INTRAVENOUS; SUBCUTANEOUS at 05:34

## 2018-10-05 RX ADMIN — ONDANSETRON 4 MILLIGRAM(S): 8 TABLET, FILM COATED ORAL at 05:05

## 2018-10-05 RX ADMIN — SODIUM CHLORIDE 1000 MILLILITER(S): 9 INJECTION INTRAMUSCULAR; INTRAVENOUS; SUBCUTANEOUS at 05:34

## 2018-10-05 RX ADMIN — Medication 3 MILLILITER(S): at 05:34

## 2018-10-05 NOTE — ED PROVIDER NOTE - CARE PROVIDERS DIRECT ADDRESSES
[email or web address]

## 2018-10-05 NOTE — ED PROVIDER NOTE - ATTENDING CONTRIBUTION TO CARE
72 yo m hx chronic back pain, cholesterol 2am, suddenly felt weak w/ n/v/ap.  no numbness, tingling, cp. primary complaint n/v/ap w/ mild sob. no fever. +lightheaded, no vertigo.      vss  card-rrr  lungs-ctab  abd- mild diffuse tenderness      belly labs, trop x1 for atypical acs, ctap, fluids, sup care  dispo pending 74 yo m hx chronic back pain, cholesterol who at 2am, suddenly felt weak w/ n/v/ap.  no  cp. primary complaint n/v/ap w/ mild sob. no fever. +lightheaded, no vertigo. no slurred speech     vss  card-rrr  lungs-ctab  abd- mild diffuse tenderness      belly labs, trop x1 for atypical acs, ctap, cth for weakness/lightheadedness, fluids, sup care  dispo pending

## 2018-10-05 NOTE — ED PROVIDER NOTE - PHYSICAL EXAMINATION
Constitutional: Well developed, well nourished. NAD  Head: Normocephalic, atraumatic.  Eyes: PERRL, EOMI.  ENT: No nasal discharge. Mucous membranes dry.  Neck: Supple. Painless ROM.  Cardiovascular:  Regular rate and rhythm.   Pulmonary:  Lungs clear to auscultation bilaterally.   Abdominal: Soft. mild nonspecific abdominal tenderness; no rebound, guarding or flank pain.  Extremities. Pelvis stable. No lower extremity edema, symmetric calves.  Skin: No rashes, cyanosis.  Neuro: AAOx3. No focal neurological deficits.  Psych: Normal mood. Normal affect.

## 2018-10-05 NOTE — ED PROVIDER NOTE - NS ED ROS FT
Constitutional: No fever, chills.  Eyes: No visual changes.  ENT: No hearing changes.  Neck: No neck pain or stiffness.  Cardiovascular: No chest pain, palpitations, edema.  Pulmonary:+ sob  Abdominal:  see hpi  : No dysuria, frequency.  Neuro: No headache, syncope, dizziness.  MS: No back pain. No calf pain/swelling.  Psych: No suicidal ideations.

## 2018-10-05 NOTE — ED ADULT NURSE NOTE - NSIMPLEMENTINTERV_GEN_ALL_ED
Implemented All Fall with Harm Risk Interventions:  [place] to call system. Call bell, personal items and telephone within reach. Instruct patient to call for assistance. Room bathroom lighting operational. Non-slip footwear when patient is off stretcher. Physically safe environment: no spills, clutter or unnecessary equipment. Stretcher in lowest position, wheels locked, appropriate side rails in place. Provide visual cue, wrist band, yellow gown, etc. Monitor gait and stability. Monitor for mental status changes and reorient to person, place, and time. Review medications for side effects contributing to fall risk. Reinforce activity limits and safety measures with patient and family. Provide visual clues: red socks.

## 2018-10-05 NOTE — ED PROVIDER NOTE - PROGRESS NOTE DETAILS
PT SIGNED OUT TO ME BY DR. KIM, FOLLOW UP CT SCAN, REASSESS AND DISPO. PT REPORTS FEELING MUCH BETTER NOW. PT WITH NO COMPLAINTS CURRENTLY.

## 2018-10-05 NOTE — ED PROVIDER NOTE - OBJECTIVE STATEMENT
73 yold male to ED Pmhx chronic back pian, Hld c/o n/v x 5, diaphoresis, difficulty breathing started tonight with mild diffuse abdominal pain; pt also c/o generalized weakness and dizziness; pt denies fever, chills, melena or brbpr; pt denies similar pain in past;

## 2018-10-05 NOTE — ED PROVIDER NOTE - MEDICAL DECISION MAKING DETAILS
74 Y/O M CHRONIC LOWER BACK PAIN PRESENTED WITH 2 HOURS OF DIZZINESS AND WEAKNESS. ALL DIAGNOSTIC TESTING REVEIWED. PT GIVEN COPIES OF ALL RESULTS AND INSTRUCTED TO FOLLOW UP WITH DR. ASHFORD THIS WEEK. PT VERBALIZED UNDERSTANDING, SON AT BEDSIDE DURING DISCUSSION.

## 2018-10-05 NOTE — ED PROVIDER NOTE - CARE PROVIDER_API CALL
Ansley Barrett), Family Medicine  [address] [place], [place] [address]  Phone: [phone]  Fax: [phone]

## 2018-10-26 ENCOUNTER — APPOINTMENT (OUTPATIENT)
Dept: UROLOGY | Facility: CLINIC | Age: 73
End: 2018-10-26
Payer: MEDICAID

## 2018-10-26 ENCOUNTER — OUTPATIENT (OUTPATIENT)
Dept: OUTPATIENT SERVICES | Facility: HOSPITAL | Age: 73
LOS: 1 days | Discharge: HOME | End: 2018-10-26

## 2018-10-26 ENCOUNTER — LABORATORY RESULT (OUTPATIENT)
Age: 73
End: 2018-10-26

## 2018-10-26 VITALS
HEART RATE: 75 BPM | BODY MASS INDEX: 29.89 KG/M2 | DIASTOLIC BLOOD PRESSURE: 76 MMHG | SYSTOLIC BLOOD PRESSURE: 131 MMHG | WEIGHT: 186 LBS | HEIGHT: 66 IN

## 2018-10-26 DIAGNOSIS — Z87.828 PERSONAL HISTORY OF OTHER (HEALED) PHYSICAL INJURY AND TRAUMA: Chronic | ICD-10-CM

## 2018-10-26 PROCEDURE — 55700: CPT

## 2018-10-26 PROCEDURE — 76872 US TRANSRECTAL: CPT

## 2018-10-26 PROCEDURE — 99213 OFFICE O/P EST LOW 20 MIN: CPT | Mod: 25

## 2018-10-29 DIAGNOSIS — R89.7 ABNORMAL HISTOLOGICAL FINDINGS IN SPECIMENS FROM OTHER ORGANS, SYSTEMS AND TISSUES: ICD-10-CM

## 2018-11-01 ENCOUNTER — OUTPATIENT (OUTPATIENT)
Dept: OUTPATIENT SERVICES | Facility: HOSPITAL | Age: 73
LOS: 1 days | End: 2018-11-01
Payer: MEDICAID

## 2018-11-01 DIAGNOSIS — Z87.828 PERSONAL HISTORY OF OTHER (HEALED) PHYSICAL INJURY AND TRAUMA: Chronic | ICD-10-CM

## 2018-11-01 PROCEDURE — G9001: CPT

## 2018-11-15 ENCOUNTER — APPOINTMENT (OUTPATIENT)
Dept: UROLOGY | Facility: CLINIC | Age: 73
End: 2018-11-15
Payer: MEDICAID

## 2018-11-15 ENCOUNTER — OUTPATIENT (OUTPATIENT)
Dept: OUTPATIENT SERVICES | Facility: HOSPITAL | Age: 73
LOS: 1 days | Discharge: HOME | End: 2018-11-15

## 2018-11-15 VITALS
SYSTOLIC BLOOD PRESSURE: 141 MMHG | HEART RATE: 98 BPM | RESPIRATION RATE: 99 BRPM | DIASTOLIC BLOOD PRESSURE: 92 MMHG | WEIGHT: 186 LBS | BODY MASS INDEX: 29.89 KG/M2 | HEIGHT: 66 IN

## 2018-11-15 DIAGNOSIS — Z87.828 PERSONAL HISTORY OF OTHER (HEALED) PHYSICAL INJURY AND TRAUMA: Chronic | ICD-10-CM

## 2018-11-15 DIAGNOSIS — N28.1 CYST OF KIDNEY, ACQUIRED: ICD-10-CM

## 2018-11-15 DIAGNOSIS — R32 UNSPECIFIED URINARY INCONTINENCE: ICD-10-CM

## 2018-11-15 DIAGNOSIS — N20.0 CALCULUS OF KIDNEY: ICD-10-CM

## 2018-11-15 DIAGNOSIS — R39.89 OTHER SYMPTOMS AND SIGNS INVOLVING THE GENITOURINARY SYSTEM: ICD-10-CM

## 2018-11-15 PROCEDURE — 99214 OFFICE O/P EST MOD 30 MIN: CPT

## 2018-11-15 RX ORDER — CIPROFLOXACIN HYDROCHLORIDE 500 MG/1
500 TABLET, FILM COATED ORAL
Qty: 2 | Refills: 0 | Status: DISCONTINUED | COMMUNITY
Start: 2018-10-04 | End: 2018-11-15

## 2018-11-21 ENCOUNTER — OUTPATIENT (OUTPATIENT)
Dept: OUTPATIENT SERVICES | Facility: HOSPITAL | Age: 73
LOS: 1 days | Discharge: HOME | End: 2018-11-21

## 2018-11-21 DIAGNOSIS — Z87.828 PERSONAL HISTORY OF OTHER (HEALED) PHYSICAL INJURY AND TRAUMA: Chronic | ICD-10-CM

## 2018-11-26 ENCOUNTER — OUTPATIENT (OUTPATIENT)
Dept: OUTPATIENT SERVICES | Facility: HOSPITAL | Age: 73
LOS: 1 days | Discharge: HOME | End: 2018-11-26

## 2018-11-26 VITALS
TEMPERATURE: 98 F | OXYGEN SATURATION: 98 % | HEIGHT: 65 IN | WEIGHT: 179.9 LBS | RESPIRATION RATE: 17 BRPM | DIASTOLIC BLOOD PRESSURE: 75 MMHG | SYSTOLIC BLOOD PRESSURE: 128 MMHG | HEART RATE: 74 BPM

## 2018-11-26 VITALS — DIASTOLIC BLOOD PRESSURE: 70 MMHG | SYSTOLIC BLOOD PRESSURE: 136 MMHG | RESPIRATION RATE: 18 BRPM | HEART RATE: 67 BPM

## 2018-11-26 DIAGNOSIS — Z87.828 PERSONAL HISTORY OF OTHER (HEALED) PHYSICAL INJURY AND TRAUMA: Chronic | ICD-10-CM

## 2018-11-26 RX ORDER — ACETAMINOPHEN 500 MG
650 TABLET ORAL ONCE
Qty: 0 | Refills: 0 | Status: DISCONTINUED | OUTPATIENT
Start: 2018-11-26 | End: 2018-12-11

## 2018-11-26 RX ORDER — ONDANSETRON 8 MG/1
4 TABLET, FILM COATED ORAL ONCE
Qty: 0 | Refills: 0 | Status: DISCONTINUED | OUTPATIENT
Start: 2018-11-26 | End: 2018-12-11

## 2018-11-26 RX ORDER — SODIUM CHLORIDE 9 MG/ML
1000 INJECTION, SOLUTION INTRAVENOUS
Qty: 0 | Refills: 0 | Status: DISCONTINUED | OUTPATIENT
Start: 2018-11-26 | End: 2018-12-11

## 2018-11-26 NOTE — ASU PREOP CHECKLIST - TO WHOM
opportunity for questions and answers rendered BECKY Huizar RN, opportunity for questions and answers rendered

## 2018-11-27 ENCOUNTER — OUTPATIENT (OUTPATIENT)
Dept: OUTPATIENT SERVICES | Facility: HOSPITAL | Age: 73
LOS: 1 days | Discharge: HOME | End: 2018-11-27

## 2018-11-27 DIAGNOSIS — Z02.9 ENCOUNTER FOR ADMINISTRATIVE EXAMINATIONS, UNSPECIFIED: ICD-10-CM

## 2018-11-27 DIAGNOSIS — Z71.89 OTHER SPECIFIED COUNSELING: ICD-10-CM

## 2018-11-27 DIAGNOSIS — Z87.828 PERSONAL HISTORY OF OTHER (HEALED) PHYSICAL INJURY AND TRAUMA: Chronic | ICD-10-CM

## 2018-11-29 DIAGNOSIS — G89.29 OTHER CHRONIC PAIN: ICD-10-CM

## 2018-11-29 DIAGNOSIS — E78.5 HYPERLIPIDEMIA, UNSPECIFIED: ICD-10-CM

## 2018-11-29 DIAGNOSIS — I10 ESSENTIAL (PRIMARY) HYPERTENSION: ICD-10-CM

## 2018-11-29 DIAGNOSIS — H26.9 UNSPECIFIED CATARACT: ICD-10-CM

## 2018-11-29 DIAGNOSIS — M54.30 SCIATICA, UNSPECIFIED SIDE: ICD-10-CM

## 2018-11-29 DIAGNOSIS — F32.9 MAJOR DEPRESSIVE DISORDER, SINGLE EPISODE, UNSPECIFIED: ICD-10-CM

## 2018-12-05 ENCOUNTER — FORM ENCOUNTER (OUTPATIENT)
Age: 73
End: 2018-12-05

## 2018-12-06 ENCOUNTER — APPOINTMENT (OUTPATIENT)
Dept: INTERNAL MEDICINE | Facility: CLINIC | Age: 73
End: 2018-12-06

## 2018-12-06 ENCOUNTER — OUTPATIENT (OUTPATIENT)
Dept: OUTPATIENT SERVICES | Facility: HOSPITAL | Age: 73
LOS: 1 days | Discharge: HOME | End: 2018-12-06

## 2018-12-06 VITALS
BODY MASS INDEX: 28.45 KG/M2 | HEART RATE: 76 BPM | TEMPERATURE: 98 F | DIASTOLIC BLOOD PRESSURE: 77 MMHG | HEIGHT: 66 IN | WEIGHT: 177 LBS | SYSTOLIC BLOOD PRESSURE: 130 MMHG

## 2018-12-06 DIAGNOSIS — Z23 ENCOUNTER FOR IMMUNIZATION: ICD-10-CM

## 2018-12-06 DIAGNOSIS — Z87.828 PERSONAL HISTORY OF OTHER (HEALED) PHYSICAL INJURY AND TRAUMA: Chronic | ICD-10-CM

## 2018-12-06 DIAGNOSIS — E78.5 HYPERLIPIDEMIA, UNSPECIFIED: ICD-10-CM

## 2018-12-06 DIAGNOSIS — N64.4 MASTODYNIA: ICD-10-CM

## 2018-12-06 DIAGNOSIS — N20.0 CALCULUS OF KIDNEY: ICD-10-CM

## 2018-12-06 DIAGNOSIS — F32.9 MAJOR DEPRESSIVE DISORDER, SINGLE EPISODE, UNSPECIFIED: ICD-10-CM

## 2018-12-06 NOTE — PHYSICAL EXAM
[No Acute Distress] : no acute distress [Well Nourished] : well nourished [Well Developed] : well developed [Well-Appearing] : well-appearing [Normal Sclera/Conjunctiva] : normal sclera/conjunctiva [No Respiratory Distress] : no respiratory distress  [Clear to Auscultation] : lungs were clear to auscultation bilaterally [Normal Rate] : normal rate  [Regular Rhythm] : with a regular rhythm [Normal S1, S2] : normal S1 and S2 [No Edema] : there was no peripheral edema [Soft] : abdomen soft [Non Tender] : non-tender [Non-distended] : non-distended [No Rash] : no rash [Normal Affect] : the affect was normal [Normal Insight/Judgement] : insight and judgment were intact [de-identified] : b/l tenderness in bresat. No signs of any infection. No masses.

## 2018-12-06 NOTE — REVIEW OF SYSTEMS
[Chest Pain] : chest pain [Incontinence] : incontinence [Fever] : no fever [Chills] : no chills [Fatigue] : no fatigue [Night Sweats] : no night sweats [Recent Change In Weight] : ~T no recent weight change [Palpitations] : no palpitations [Claudication] : no  leg claudication [Lower Ext Edema] : no lower extremity edema [Shortness Of Breath] : no shortness of breath [Wheezing] : no wheezing [Dyspnea on Exertion] : not dyspnea on exertion [Abdominal Pain] : no abdominal pain [Nausea] : no nausea [Constipation] : no constipation [Diarrhea] : no diarrhea [Vomiting] : no vomiting [Dysuria] : no dysuria

## 2018-12-06 NOTE — ASSESSMENT
[FreeTextEntry1] : 74 yo M male with PMH listed presented for routine follow up.\par \par ASSESSMENT / PLAN:\par \par # b/l breast pain: \par - Mammogram \par \par # Depression:\par - Continue sertraline, refills given\par \par # Urinary incontinence:\par - Still uses diapers as per son and patient\par - Continue Urology follow up \par \par # DLD: Continue Atorvastatin\par \par # OA: Continue Diclofenac topical\par \par # HCM:\par - Routine labs to be completed 9/2019\par - Flu shot up to date\par - Colonoscopy UTD\par - Send to Pharmacy for shingles vaccine \par - Will give patient Prevnar today \par \par - RTC in 6 months.

## 2018-12-06 NOTE — HISTORY OF PRESENT ILLNESS
[Family Member] : family member [FreeTextEntry1] : Follow up appointment [de-identified] : 72 yo M male with PMH listed presented for routine follow up. Patient states he has been having chest pain for 2 weeks. Pain states as if is punched in the lateral portion by his RLQ in abdomen and the pain travels up to the right side of his chest. The pain is mainly in his right side of the chest and radiating to the back. Patient states in the past he has similar complaints when he had his singles episode. Patient states at home he has taken lyrics and creams for lower back pain but has had no relief. Patient states nothing makes the pain better or worse and is present constantly. Patient states on a scale from 0 to 10 with 10 being the worst pain he has ever had this feels like a 6. Patient states he believes the pain is muscular. n/v/dizziness. Patient states the pain is worsened by activity. Denies SOB. Pain is not exacerbated by oral intake. Patient states he needs insurance paperwork filled out and also needs to see if shingles and pneumonia vaccines are up to date.

## 2018-12-13 ENCOUNTER — FORM ENCOUNTER (OUTPATIENT)
Age: 73
End: 2018-12-13

## 2018-12-14 ENCOUNTER — OUTPATIENT (OUTPATIENT)
Dept: OUTPATIENT SERVICES | Facility: HOSPITAL | Age: 73
LOS: 1 days | Discharge: HOME | End: 2018-12-14

## 2018-12-14 DIAGNOSIS — Z87.828 PERSONAL HISTORY OF OTHER (HEALED) PHYSICAL INJURY AND TRAUMA: Chronic | ICD-10-CM

## 2018-12-14 DIAGNOSIS — N62 HYPERTROPHY OF BREAST: ICD-10-CM

## 2019-03-14 ENCOUNTER — OUTPATIENT (OUTPATIENT)
Dept: OUTPATIENT SERVICES | Facility: HOSPITAL | Age: 74
LOS: 1 days | Discharge: HOME | End: 2019-03-14

## 2019-03-14 ENCOUNTER — APPOINTMENT (OUTPATIENT)
Dept: UROLOGY | Facility: CLINIC | Age: 74
End: 2019-03-14
Payer: MEDICAID

## 2019-03-14 VITALS
BODY MASS INDEX: 27.48 KG/M2 | DIASTOLIC BLOOD PRESSURE: 76 MMHG | HEART RATE: 74 BPM | SYSTOLIC BLOOD PRESSURE: 160 MMHG | WEIGHT: 171 LBS | HEIGHT: 66 IN

## 2019-03-14 DIAGNOSIS — Z87.828 PERSONAL HISTORY OF OTHER (HEALED) PHYSICAL INJURY AND TRAUMA: Chronic | ICD-10-CM

## 2019-03-14 PROCEDURE — 99214 OFFICE O/P EST MOD 30 MIN: CPT

## 2019-03-14 RX ORDER — OXYBUTYNIN CHLORIDE 5 MG/1
5 TABLET, EXTENDED RELEASE ORAL
Qty: 90 | Refills: 3 | Status: DISCONTINUED | COMMUNITY
Start: 2018-10-04 | End: 2019-03-14

## 2019-03-14 NOTE — HISTORY OF PRESENT ILLNESS
[FreeTextEntry1] : 74 y/o m referred to urology for urinary incontinence. Pt with 6 months of progressively worsening incontinence requiring the use of diapers. 2 per day.\par Had medium stream, + frequency q 1-2 hrs, + nocturia x 2 with enuresis, + urgency with incontinence, + foul odor, No dysuria, hematuria, no hesitancy , voids in small amounts, + PV fullness, + PV dribbling. \par started on ditropan last visit and states that his incontinence has improved but asks for next dose up to 10mg XL\par \par Pt also has a very firm prostate on KISHAN. PSA was 2.52 \par prostate biopsy done last visit: all 12 cores were benign\par \par Renal sonogram from August showed 4mm right renal stone and bilateral cysts\par \par Aug 2018:\par ucx = no growth\par PSA 2.52\par UA = protein, - Nit\par Bladder sonogram shows: no hydro, 4mm right renal stone, bilat renal cysts, PVR 18ml, prostate 30g. \par \par US Renal; \par Xray Kidney Ureter Bladder- Dec 2018-- 3mm stone right upper pole

## 2019-03-14 NOTE — ASSESSMENT
[FreeTextEntry1] : 72 y/o m referred to urology for urinary incontinence. Pt with 6 months of progressively worsening incontinence requiring the use of diapers. 2 per day.\par Had medium stream, + frequency q 1-2 hrs, + nocturia x 2 with enuresis, + urgency with incontinence, + foul odor, No dysuria, hematuria, no hesitancy , voids in small amounts, + PV fullness, + PV dribbling. \par started on ditropan last visit and states that his incontinence has improved but asks for next dose up to 10mg XL\par \par Pt also has a very firm prostate on KISHAN. PSA was 2.52 \par prostate biopsy done last visit: all 12 cores were benign\par \par Renal sonogram from August showed 4mm right renal stone and bilateral cysts\par \par Aug 2018:\par ucx = no growth\par PSA 2.52\par UA = protein, - Nit\par Bladder sonogram shows: no hydro, 4mm right renal stone, bilat renal cysts, PVR 18ml, prostate 30g. \par \par US Renal; \par Xray Kidney Ureter Bladder- Dec 2018-- 3mm stone right upper pole

## 2019-03-14 NOTE — PHYSICAL EXAM
[General Appearance - Well Developed] : well developed [General Appearance - Well Nourished] : well nourished [Normal Appearance] : normal appearance [Well Groomed] : well groomed [General Appearance - In No Acute Distress] : no acute distress [Abdomen Soft] : soft [Abdomen Tenderness] : non-tender [Costovertebral Angle Tenderness] : no ~M costovertebral angle tenderness [Urethral Meatus] : meatus normal [No Prostate Nodules] : no prostate nodules [Skin Color & Pigmentation] : normal skin color and pigmentation [Heart Rate And Rhythm] : Heart rate and rhythm were normal [Edema] : no peripheral edema [] : no respiratory distress [Respiration, Rhythm And Depth] : normal respiratory rhythm and effort [Exaggerated Use Of Accessory Muscles For Inspiration] : no accessory muscle use [Oriented To Time, Place, And Person] : oriented to person, place, and time [Affect] : the affect was normal [Mood] : the mood was normal [Not Anxious] : not anxious [Normal Station and Gait] : the gait and station were normal for the patient's age [No Focal Deficits] : no focal deficits

## 2019-03-20 ENCOUNTER — FORM ENCOUNTER (OUTPATIENT)
Age: 74
End: 2019-03-20

## 2019-03-21 ENCOUNTER — OUTPATIENT (OUTPATIENT)
Dept: OUTPATIENT SERVICES | Facility: HOSPITAL | Age: 74
LOS: 1 days | Discharge: HOME | End: 2019-03-21

## 2019-03-21 ENCOUNTER — APPOINTMENT (OUTPATIENT)
Dept: INTERNAL MEDICINE | Facility: CLINIC | Age: 74
End: 2019-03-21

## 2019-03-21 VITALS
OXYGEN SATURATION: 98 % | RESPIRATION RATE: 18 BRPM | TEMPERATURE: 97 F | SYSTOLIC BLOOD PRESSURE: 156 MMHG | HEART RATE: 72 BPM | WEIGHT: 167.55 LBS | DIASTOLIC BLOOD PRESSURE: 75 MMHG | HEIGHT: 65 IN

## 2019-03-21 DIAGNOSIS — Z01.818 ENCOUNTER FOR OTHER PREPROCEDURAL EXAMINATION: ICD-10-CM

## 2019-03-21 DIAGNOSIS — Z87.828 PERSONAL HISTORY OF OTHER (HEALED) PHYSICAL INJURY AND TRAUMA: Chronic | ICD-10-CM

## 2019-03-21 DIAGNOSIS — N20.2 CALCULUS OF KIDNEY WITH CALCULUS OF URETER: ICD-10-CM

## 2019-03-21 LAB
ALBUMIN SERPL ELPH-MCNC: 4.1 G/DL — SIGNIFICANT CHANGE UP (ref 3.5–5.2)
ALP SERPL-CCNC: 115 U/L — SIGNIFICANT CHANGE UP (ref 30–115)
ALT FLD-CCNC: 20 U/L — SIGNIFICANT CHANGE UP (ref 0–41)
ANION GAP SERPL CALC-SCNC: 14 MMOL/L — SIGNIFICANT CHANGE UP (ref 7–14)
APPEARANCE UR: ABNORMAL
APTT BLD: 33.6 SEC — SIGNIFICANT CHANGE UP (ref 27–39.2)
AST SERPL-CCNC: 19 U/L — SIGNIFICANT CHANGE UP (ref 0–41)
BACTERIA # UR AUTO: ABNORMAL /HPF
BILIRUB SERPL-MCNC: 1.2 MG/DL — SIGNIFICANT CHANGE UP (ref 0.2–1.2)
BILIRUB UR-MCNC: NEGATIVE — SIGNIFICANT CHANGE UP
BUN SERPL-MCNC: 16 MG/DL — SIGNIFICANT CHANGE UP (ref 10–20)
CALCIUM SERPL-MCNC: 9.4 MG/DL — SIGNIFICANT CHANGE UP (ref 8.5–10.1)
CHLORIDE SERPL-SCNC: 105 MMOL/L — SIGNIFICANT CHANGE UP (ref 98–110)
CO2 SERPL-SCNC: 24 MMOL/L — SIGNIFICANT CHANGE UP (ref 17–32)
COLOR SPEC: YELLOW — SIGNIFICANT CHANGE UP
CREAT SERPL-MCNC: 1.1 MG/DL — SIGNIFICANT CHANGE UP (ref 0.7–1.5)
DIFF PNL FLD: NEGATIVE — SIGNIFICANT CHANGE UP
EPI CELLS # UR: ABNORMAL /HPF
GLUCOSE SERPL-MCNC: 79 MG/DL — SIGNIFICANT CHANGE UP (ref 70–99)
GLUCOSE UR QL: NEGATIVE MG/DL — SIGNIFICANT CHANGE UP
HCT VFR BLD CALC: 54.4 % — HIGH (ref 42–52)
HGB BLD-MCNC: 17.8 G/DL — SIGNIFICANT CHANGE UP (ref 14–18)
INR BLD: 1.02 RATIO — SIGNIFICANT CHANGE UP (ref 0.65–1.3)
KETONES UR-MCNC: NEGATIVE — SIGNIFICANT CHANGE UP
LEUKOCYTE ESTERASE UR-ACNC: ABNORMAL
MCHC RBC-ENTMCNC: 30.5 PG — SIGNIFICANT CHANGE UP (ref 27–31)
MCHC RBC-ENTMCNC: 32.7 G/DL — SIGNIFICANT CHANGE UP (ref 32–37)
MCV RBC AUTO: 93.2 FL — SIGNIFICANT CHANGE UP (ref 80–94)
NITRITE UR-MCNC: POSITIVE
NRBC # BLD: 0 /100 WBCS — SIGNIFICANT CHANGE UP (ref 0–0)
PH UR: 7 — SIGNIFICANT CHANGE UP (ref 5–8)
PLATELET # BLD AUTO: 183 K/UL — SIGNIFICANT CHANGE UP (ref 130–400)
POTASSIUM SERPL-MCNC: 4.4 MMOL/L — SIGNIFICANT CHANGE UP (ref 3.5–5)
POTASSIUM SERPL-SCNC: 4.4 MMOL/L — SIGNIFICANT CHANGE UP (ref 3.5–5)
PROT SERPL-MCNC: 7.2 G/DL — SIGNIFICANT CHANGE UP (ref 6–8)
PROT UR-MCNC: NEGATIVE MG/DL — SIGNIFICANT CHANGE UP
PROTHROM AB SERPL-ACNC: 11.7 SEC — SIGNIFICANT CHANGE UP (ref 9.95–12.87)
RBC # BLD: 5.84 M/UL — SIGNIFICANT CHANGE UP (ref 4.7–6.1)
RBC # FLD: 13.3 % — SIGNIFICANT CHANGE UP (ref 11.5–14.5)
RBC CASTS # UR COMP ASSIST: >50 /HPF
SODIUM SERPL-SCNC: 143 MMOL/L — SIGNIFICANT CHANGE UP (ref 135–146)
SP GR SPEC: 1.02 — SIGNIFICANT CHANGE UP (ref 1.01–1.03)
UROBILINOGEN FLD QL: 1 MG/DL (ref 0.2–0.2)
WBC # BLD: 11.17 K/UL — HIGH (ref 4.8–10.8)
WBC # FLD AUTO: 11.17 K/UL — HIGH (ref 4.8–10.8)
WBC UR QL: SIGNIFICANT CHANGE UP /HPF

## 2019-03-21 RX ORDER — SERTRALINE 25 MG/1
1 TABLET, FILM COATED ORAL
Qty: 0 | Refills: 0 | COMMUNITY

## 2019-03-21 RX ORDER — DICLOFENAC SODIUM 75 MG/1
1 TABLET, DELAYED RELEASE ORAL
Qty: 0 | Refills: 0 | COMMUNITY

## 2019-03-21 NOTE — ASSESSMENT
[FreeTextEntry1] : # Depression:\par - Asymptomatic. Denies any suicidal thoughts. \par - Continue sertraline, refills given. \par \par # Urinary incontinence:\par - Still uses diapers as per son and patient\par - Continue Urology follow up \par \par #symptomatic right nephrolithiasis(4mm right renal stone)- urology note appreciated- discussed options for surgery - opts for right ureteroscopy with laser lithotripsy to fragment upper pole stone \par  ditropan increased to 10mg XL \par  patient went for preop testing today, no labs available\par \par # DLD: Continue Atorvastatin\par \par # OA: Continue Diclofenac topical (prescribed by pain mngmnt)\par \par # HCM:\par - repeat routine labs\par - Flu shot up to date\par - Colonoscopy UTD\par -  Prevnar given at last visit, shingles sent to pharmacy\par - RTC in 3 months.

## 2019-03-21 NOTE — H&P PST ADULT - NSICDXPASTMEDICALHX_GEN_ALL_CORE_FT
PAST MEDICAL HISTORY:  Acute depression     Chronic low back pain with sciatica     History of hyperlipidemia     HTN (hypertension) PAST MEDICAL HISTORY:  Acute depression     Arrhythmia left BBB    Chronic low back pain with sciatica     History of hyperlipidemia     HTN (hypertension)

## 2019-03-21 NOTE — HISTORY OF PRESENT ILLNESS
[de-identified] : 72 yo M male with PMH of osteoarthritis(follows with pain management), urinary incontinence (wears adult diapers, follows with urology), symptomatic right nephrolithiasis (plan for lithotripsy next month) presents for routine follow up and refill of his medications. currently complains of his usual OA pain + right cva colicky pain, saw urology in am and is scheduled for lithotripsy next month per son.

## 2019-03-21 NOTE — REVIEW OF SYSTEMS
[Incontinence] : incontinence [Nocturia] : nocturia [Frequency] : frequency [Joint Pain] : joint pain [Back Pain] : back pain [Negative] : Heme/Lymph

## 2019-03-21 NOTE — PHYSICAL EXAM
[No Acute Distress] : no acute distress [Well Nourished] : well nourished [Well Developed] : well developed [Well-Appearing] : well-appearing [Normal Sclera/Conjunctiva] : normal sclera/conjunctiva [EOMI] : extraocular movements intact [Normal Outer Ear/Nose] : the outer ears and nose were normal in appearance [Normal Oropharynx] : the oropharynx was normal [No JVD] : no jugular venous distention [Supple] : supple [No Lymphadenopathy] : no lymphadenopathy [Thyroid Normal, No Nodules] : the thyroid was normal and there were no nodules present [No Respiratory Distress] : no respiratory distress  [Clear to Auscultation] : lungs were clear to auscultation bilaterally [No Accessory Muscle Use] : no accessory muscle use [Normal Rate] : normal rate  [Regular Rhythm] : with a regular rhythm [Normal S1, S2] : normal S1 and S2 [No Murmur] : no murmur heard [No Edema] : there was no peripheral edema [No Extremity Clubbing/Cyanosis] : no extremity clubbing/cyanosis [Soft] : abdomen soft [Non Tender] : non-tender [Non-distended] : non-distended [No Masses] : no abdominal mass palpated [No HSM] : no HSM [Normal Bowel Sounds] : normal bowel sounds [Normal Posterior Cervical Nodes] : no posterior cervical lymphadenopathy [Normal Anterior Cervical Nodes] : no anterior cervical lymphadenopathy [No Spinal Tenderness] : no spinal tenderness [No Joint Swelling] : no joint swelling [Grossly Normal Strength/Tone] : grossly normal strength/tone [No Rash] : no rash [Normal Gait] : normal gait [Coordination Grossly Intact] : coordination grossly intact [No Focal Deficits] : no focal deficits [Deep Tendon Reflexes (DTR)] : deep tendon reflexes were 2+ and symmetric [Normal Affect] : the affect was normal [Normal Insight/Judgement] : insight and judgment were intact [de-identified] : right cva tenderness

## 2019-03-21 NOTE — H&P PST ADULT - REASON FOR ADMISSION
Pt denies cp palp uri cough dysuria or sob. ET 1 FOS denies SOB . PT denies any open wounds, drainage or rashes.scheduled for cystoscopy right ureteroscopy laser lithotripsy ureteroscxopy stent placement. pt denies diclofenac at this time and was advised hold diclofenac cream topical 7 days prior as well . pacific interpreters utuilized and son as well- felisha moya Pt denies cp palp uri cough dysuria or sob. ET 1 FOS denies SOB . PT denies any open wounds, drainage or rashes  scheduled for cystoscopy right ureteroscopy laser lithotripsy ureteroscxopy stent placement. pt denies diclofenac at this time and was advised hold diclofenac cream topical 7 days prior as well . pacific interpreters utilized and son as well- felisha moya

## 2019-03-23 LAB
CULTURE RESULTS: SIGNIFICANT CHANGE UP
SPECIMEN SOURCE: SIGNIFICANT CHANGE UP

## 2019-03-25 ENCOUNTER — OUTPATIENT (OUTPATIENT)
Dept: OUTPATIENT SERVICES | Facility: HOSPITAL | Age: 74
LOS: 1 days | Discharge: HOME | End: 2019-03-25

## 2019-03-25 DIAGNOSIS — N20.2 CALCULUS OF KIDNEY WITH CALCULUS OF URETER: ICD-10-CM

## 2019-03-25 DIAGNOSIS — Z01.818 ENCOUNTER FOR OTHER PREPROCEDURAL EXAMINATION: ICD-10-CM

## 2019-03-25 DIAGNOSIS — Z87.828 PERSONAL HISTORY OF OTHER (HEALED) PHYSICAL INJURY AND TRAUMA: Chronic | ICD-10-CM

## 2019-03-26 ENCOUNTER — OUTPATIENT (OUTPATIENT)
Dept: OUTPATIENT SERVICES | Facility: HOSPITAL | Age: 74
LOS: 1 days | Discharge: HOME | End: 2019-03-26

## 2019-03-26 ENCOUNTER — APPOINTMENT (OUTPATIENT)
Dept: INTERNAL MEDICINE | Facility: CLINIC | Age: 74
End: 2019-03-26

## 2019-03-26 ENCOUNTER — LABORATORY RESULT (OUTPATIENT)
Age: 74
End: 2019-03-26

## 2019-03-26 VITALS
HEIGHT: 66 IN | BODY MASS INDEX: 27 KG/M2 | DIASTOLIC BLOOD PRESSURE: 80 MMHG | HEART RATE: 69 BPM | WEIGHT: 168 LBS | TEMPERATURE: 98.3 F | SYSTOLIC BLOOD PRESSURE: 164 MMHG

## 2019-03-26 DIAGNOSIS — Z87.828 PERSONAL HISTORY OF OTHER (HEALED) PHYSICAL INJURY AND TRAUMA: Chronic | ICD-10-CM

## 2019-03-26 DIAGNOSIS — I44.7 LEFT BUNDLE-BRANCH BLOCK, UNSPECIFIED: ICD-10-CM

## 2019-03-26 DIAGNOSIS — I73.9 PERIPHERAL VASCULAR DISEASE, UNSPECIFIED: ICD-10-CM

## 2019-03-26 DIAGNOSIS — F32.9 MAJOR DEPRESSIVE DISORDER, SINGLE EPISODE, UNSPECIFIED: ICD-10-CM

## 2019-03-26 DIAGNOSIS — N20.2 CALCULUS OF KIDNEY WITH CALCULUS OF URETER: ICD-10-CM

## 2019-03-26 DIAGNOSIS — E78.5 HYPERLIPIDEMIA, UNSPECIFIED: ICD-10-CM

## 2019-03-26 NOTE — PHYSICAL EXAM
[No Acute Distress] : no acute distress [Well Nourished] : well nourished [Well Developed] : well developed [No JVD] : no jugular venous distention [No Respiratory Distress] : no respiratory distress  [Clear to Auscultation] : lungs were clear to auscultation bilaterally [Normal Rate] : normal rate  [Regular Rhythm] : with a regular rhythm [Normal S1, S2] : normal S1 and S2 [No Joint Swelling] : no joint swelling [Normal Gait] : normal gait

## 2019-03-26 NOTE — HISTORY OF PRESENT ILLNESS
[de-identified] : 72 yo M male with PMH of osteoarthritis(follows with pain management), urinary incontinence (wears adult diapers, follows with urology), symptomatic right nephrolithiasis presents for preop clearance for  right ureteroscopy with laser lithotripsy. \par \par Pt attests to [facility] after walking 1 block and pain in his legs as well. He was told to obtain testing for  Peripheral arterial disease. \par  \par

## 2019-03-26 NOTE — REVIEW OF SYSTEMS
[Claudication] : leg claudication [Dyspnea on Exertion] : dyspnea on exertion [Negative] : Constitutional [Lower Ext Edema] : no lower extremity edema [FreeTextEntry6] : + Snoring at night

## 2019-03-26 NOTE — ASSESSMENT
[FreeTextEntry1] : pt of dr dai here for preop\par \par # Preop Clearance for Lithotrispy\par - Procedure scheduled for April 2\par - EKG with LBBB seen in 2019 and previously in 2018\par - Pt has not had any cardiac w/u- denied ETOH/Smoking\par - Age and DLD and positive risk factors complains of dyspnea on exertion and leg pain and claudication after 1block\par - Check ECHO\par - Check LE arterial duplex given sxs of claudication\par - Defer Nuclear stress testing to cardiology\par - Cardiology referral given\par - Pt was explained that he needs to undergo additional testing prior to Gen. Anesthesia for procedure\par - Audiology referral given \par \par # Elevated Hemoglobin\par - No hx of smoking\par -Concernfor sleep apnea\par -Consider sleep study \par - f/u 1 month with Dr. Dai

## 2019-03-27 ENCOUNTER — FORM ENCOUNTER (OUTPATIENT)
Age: 74
End: 2019-03-27

## 2019-03-27 ENCOUNTER — OUTPATIENT (OUTPATIENT)
Dept: OUTPATIENT SERVICES | Facility: HOSPITAL | Age: 74
LOS: 1 days | Discharge: HOME | End: 2019-03-27

## 2019-03-27 DIAGNOSIS — I73.9 PERIPHERAL VASCULAR DISEASE, UNSPECIFIED: ICD-10-CM

## 2019-03-27 DIAGNOSIS — Z87.828 PERSONAL HISTORY OF OTHER (HEALED) PHYSICAL INJURY AND TRAUMA: Chronic | ICD-10-CM

## 2019-03-27 DIAGNOSIS — Z01.818 ENCOUNTER FOR OTHER PREPROCEDURAL EXAMINATION: ICD-10-CM

## 2019-03-27 DIAGNOSIS — R06.09 OTHER FORMS OF DYSPNEA: ICD-10-CM

## 2019-03-28 ENCOUNTER — FORM ENCOUNTER (OUTPATIENT)
Age: 74
End: 2019-03-28

## 2019-03-28 ENCOUNTER — OUTPATIENT (OUTPATIENT)
Dept: OUTPATIENT SERVICES | Facility: HOSPITAL | Age: 74
LOS: 1 days | Discharge: HOME | End: 2019-03-28

## 2019-03-28 DIAGNOSIS — I44.7 LEFT BUNDLE-BRANCH BLOCK, UNSPECIFIED: ICD-10-CM

## 2019-03-28 DIAGNOSIS — Z87.828 PERSONAL HISTORY OF OTHER (HEALED) PHYSICAL INJURY AND TRAUMA: Chronic | ICD-10-CM

## 2019-03-28 LAB
APPEARANCE: CLEAR
BILIRUBIN URINE: NEGATIVE
BLOOD URINE: NEGATIVE
COLOR: YELLOW
GLUCOSE QUALITATIVE U: NEGATIVE MG/DL
KETONES URINE: NEGATIVE
LEUKOCYTE ESTERASE URINE: ABNORMAL
NITRITE URINE: POSITIVE
PH URINE: 6.5
PROTEIN URINE: NEGATIVE MG/DL
SPECIFIC GRAVITY URINE: 1.02
UROBILINOGEN URINE: 0.2 MG/DL (ref 0.2–?)

## 2019-03-29 ENCOUNTER — APPOINTMENT (OUTPATIENT)
Dept: CARDIOLOGY | Facility: CLINIC | Age: 74
End: 2019-03-29
Payer: MEDICAID

## 2019-03-29 ENCOUNTER — OUTPATIENT (OUTPATIENT)
Dept: OUTPATIENT SERVICES | Facility: HOSPITAL | Age: 74
LOS: 1 days | Discharge: HOME | End: 2019-03-29

## 2019-03-29 ENCOUNTER — OUTPATIENT (OUTPATIENT)
Dept: OUTPATIENT SERVICES | Facility: HOSPITAL | Age: 74
LOS: 1 days | Discharge: HOME | End: 2019-03-29
Payer: MEDICAID

## 2019-03-29 VITALS
HEIGHT: 66 IN | HEART RATE: 73 BPM | SYSTOLIC BLOOD PRESSURE: 143 MMHG | DIASTOLIC BLOOD PRESSURE: 75 MMHG | BODY MASS INDEX: 27.32 KG/M2 | TEMPERATURE: 98.1 F | WEIGHT: 170 LBS

## 2019-03-29 DIAGNOSIS — H90.3 SENSORINEURAL HEARING LOSS, BILATERAL: ICD-10-CM

## 2019-03-29 DIAGNOSIS — Z87.828 PERSONAL HISTORY OF OTHER (HEALED) PHYSICAL INJURY AND TRAUMA: Chronic | ICD-10-CM

## 2019-03-29 DIAGNOSIS — H91.23 SUDDEN IDIOPATHIC HEARING LOSS, BILATERAL: ICD-10-CM

## 2019-03-29 PROCEDURE — 93925 LOWER EXTREMITY STUDY: CPT | Mod: 26

## 2019-03-29 PROCEDURE — ZZZZZ: CPT

## 2019-03-29 NOTE — DISCUSSION/SUMMARY
[Procedure Intermediate Risk] : the procedure risk is intermediate [As per surgery] : as per surgery [FreeTextEntry1] : Follow up after stress test

## 2019-03-29 NOTE — HISTORY OF PRESENT ILLNESS
[Scheduled Procedure ___] : a [unfilled] [FreeTextEntry1] : Yakut-speaking - All history obtained via  Annie\par \par 72 yo M referred for abnormal EKG - LBBB - noted prior to clearance\par \par c/o shortness of breath on minimal exertion associated with dizziness and gait imbalance - possible angina equivalent?\par \par No history of MI or CHF\par \par Echo: EF 60%, Mild AI, Mild MR, Mild TR

## 2019-03-29 NOTE — PHYSICAL EXAM
[General Appearance - Well Developed] : well developed [General Appearance - In No Acute Distress] : no acute distress [Normal Conjunctiva] : the conjunctiva exhibited no abnormalities [Eyelids - No Xanthelasma] : the eyelids demonstrated no xanthelasmas [Respiration, Rhythm And Depth] : normal respiratory rhythm and effort [Exaggerated Use Of Accessory Muscles For Inspiration] : no accessory muscle use [Auscultation Breath Sounds / Voice Sounds] : lungs were clear to auscultation bilaterally [Heart Rate And Rhythm] : heart rate and rhythm were normal [Heart Sounds] : normal S1 and S2 [Murmurs] : no murmurs present [Abdomen Soft] : soft [Abdomen Tenderness] : non-tender [] : no hepato-splenomegaly [Abdomen Mass (___ Cm)] : no abdominal mass palpated [Nail Clubbing] : no clubbing of the fingernails [Cyanosis, Localized] : no localized cyanosis [Skin Color & Pigmentation] : normal skin color and pigmentation [No Skin Ulcers] : no skin ulcer [Oriented To Time, Place, And Person] : oriented to person, place, and time [FreeTextEntry1] : no JVD

## 2019-04-02 ENCOUNTER — APPOINTMENT (OUTPATIENT)
Dept: UROLOGY | Facility: HOSPITAL | Age: 74
End: 2019-04-02

## 2019-04-04 ENCOUNTER — OUTPATIENT (OUTPATIENT)
Dept: OUTPATIENT SERVICES | Facility: HOSPITAL | Age: 74
LOS: 1 days | Discharge: HOME | End: 2019-04-04

## 2019-04-04 DIAGNOSIS — M79.89 OTHER SPECIFIED SOFT TISSUE DISORDERS: ICD-10-CM

## 2019-04-04 DIAGNOSIS — Z87.828 PERSONAL HISTORY OF OTHER (HEALED) PHYSICAL INJURY AND TRAUMA: Chronic | ICD-10-CM

## 2019-04-05 DIAGNOSIS — H90.3 SENSORINEURAL HEARING LOSS, BILATERAL: ICD-10-CM

## 2019-04-09 LAB
25(OH)D3 SERPL-MCNC: 37 NG/ML
ALBUMIN SERPL ELPH-MCNC: 4.2 G/DL
ALP BLD-CCNC: 115 U/L
ALT SERPL-CCNC: 19 U/L
ANION GAP SERPL CALC-SCNC: 9 MMOL/L
AST SERPL-CCNC: 19 U/L
BACTERIA UR CULT: ABNORMAL
BASOPHILS # BLD AUTO: 0.09 K/UL
BASOPHILS NFR BLD AUTO: 0.8 %
BILIRUB SERPL-MCNC: 0.9 MG/DL
BUN SERPL-MCNC: 17 MG/DL
CALCIUM SERPL-MCNC: 8.8 MG/DL
CHLORIDE SERPL-SCNC: 102 MMOL/L
CHOLEST SERPL-MCNC: 118 MG/DL
CHOLEST/HDLC SERPL: 2.7 RATIO
CO2 SERPL-SCNC: 29 MMOL/L
CREAT SERPL-MCNC: 1 MG/DL
EOSINOPHIL # BLD AUTO: 0.25 K/UL
EOSINOPHIL NFR BLD AUTO: 2.2 %
GLUCOSE SERPL-MCNC: 90 MG/DL
HCT VFR BLD CALC: 56.3 %
HDLC SERPL-MCNC: 43 MG/DL
HGB BLD-MCNC: 18 G/DL
IMM GRANULOCYTES NFR BLD AUTO: 0.4 %
LDLC SERPL CALC-MCNC: 62 MG/DL
LYMPHOCYTES # BLD AUTO: 2.9 K/UL
LYMPHOCYTES NFR BLD AUTO: 25.9 %
MAN DIFF?: NORMAL
MCHC RBC-ENTMCNC: 30 PG
MCHC RBC-ENTMCNC: 32 G/DL
MCV RBC AUTO: 93.7 FL
MONOCYTES # BLD AUTO: 0.59 K/UL
MONOCYTES NFR BLD AUTO: 5.3 %
NEUTROPHILS # BLD AUTO: 7.32 K/UL
NEUTROPHILS NFR BLD AUTO: 65.4 %
PLATELET # BLD AUTO: 178 K/UL
POTASSIUM SERPL-SCNC: 4.4 MMOL/L
PROT SERPL-MCNC: 6.8 G/DL
RBC # BLD: 6.01 M/UL
RBC # FLD: 12.9 %
SODIUM SERPL-SCNC: 140 MMOL/L
TRIGL SERPL-MCNC: 92 MG/DL
WBC # FLD AUTO: 11.19 K/UL

## 2019-04-11 ENCOUNTER — RECORD ABSTRACTING (OUTPATIENT)
Age: 74
End: 2019-04-11

## 2019-04-11 ENCOUNTER — APPOINTMENT (OUTPATIENT)
Dept: VASCULAR SURGERY | Facility: CLINIC | Age: 74
End: 2019-04-11
Payer: MEDICAID

## 2019-04-11 VITALS
HEIGHT: 65 IN | DIASTOLIC BLOOD PRESSURE: 70 MMHG | WEIGHT: 175 LBS | SYSTOLIC BLOOD PRESSURE: 120 MMHG | BODY MASS INDEX: 29.16 KG/M2

## 2019-04-11 PROCEDURE — 99203 OFFICE O/P NEW LOW 30 MIN: CPT

## 2019-04-11 NOTE — ASSESSMENT
[FreeTextEntry1] : 72 y/o gentleman with h/o chronic lower back pain, h/o spinal surgery, recently admitted for renal stones, needed lithotripsy but was not cleared for surgery as he complained of calf pain bilaterally on ambulation, and burning to calves at night. He had an arterial duplex done at [facility] on 3/29/19 that showed no significant arterial disease in the lower extremities bilaterally.\par He has palpable pulses throughout the arterial tree in the lower extremities bilaterally and his symptoms are neurogenic in nature, and are most likely referred from his lower back. No contraindication from a vascular standpoint to undergo lithotripsy.

## 2019-04-11 NOTE — HISTORY OF PRESENT ILLNESS
[FreeTextEntry1] : 74 y/o gentleman with h/o chronic lower back pain, h/o spinal surgery, recently admitted for renal stones, needed lithotripsy but was not cleared for surgery as he complained of calf pain bilaterally on ambulation, and burning to calves at night. He had an arterial duplex done at [facility] on 3/29/19 that showed no significant arterial disease in the lower extremities bilaterally.

## 2019-04-11 NOTE — CONSULT LETTER
[Dear  ___] : Dear  [unfilled], [Please see my note below.] : Please see my note below. [Consult Letter:] : I had the pleasure of evaluating your patient, [unfilled].

## 2019-04-17 ENCOUNTER — FORM ENCOUNTER (OUTPATIENT)
Age: 74
End: 2019-04-17

## 2019-04-18 ENCOUNTER — OUTPATIENT (OUTPATIENT)
Dept: OUTPATIENT SERVICES | Facility: HOSPITAL | Age: 74
LOS: 1 days | Discharge: HOME | End: 2019-04-18
Payer: MEDICAID

## 2019-04-18 DIAGNOSIS — Z87.828 PERSONAL HISTORY OF OTHER (HEALED) PHYSICAL INJURY AND TRAUMA: Chronic | ICD-10-CM

## 2019-04-18 DIAGNOSIS — R07.9 CHEST PAIN, UNSPECIFIED: ICD-10-CM

## 2019-04-18 PROCEDURE — 78452 HT MUSCLE IMAGE SPECT MULT: CPT | Mod: 26

## 2019-04-23 ENCOUNTER — APPOINTMENT (OUTPATIENT)
Dept: CARDIOLOGY | Facility: CLINIC | Age: 74
End: 2019-04-23
Payer: MEDICAID

## 2019-04-23 ENCOUNTER — OUTPATIENT (OUTPATIENT)
Dept: OUTPATIENT SERVICES | Facility: HOSPITAL | Age: 74
LOS: 1 days | Discharge: HOME | End: 2019-04-23

## 2019-04-23 VITALS
DIASTOLIC BLOOD PRESSURE: 84 MMHG | HEART RATE: 82 BPM | BODY MASS INDEX: 28.16 KG/M2 | TEMPERATURE: 98.2 F | WEIGHT: 169 LBS | HEIGHT: 65 IN | SYSTOLIC BLOOD PRESSURE: 142 MMHG

## 2019-04-23 DIAGNOSIS — Z87.828 PERSONAL HISTORY OF OTHER (HEALED) PHYSICAL INJURY AND TRAUMA: Chronic | ICD-10-CM

## 2019-04-23 DIAGNOSIS — I20.8 OTHER FORMS OF ANGINA PECTORIS: ICD-10-CM

## 2019-04-23 PROCEDURE — 99213 OFFICE O/P EST LOW 20 MIN: CPT

## 2019-04-23 NOTE — HISTORY OF PRESENT ILLNESS
[Scheduled Procedure ___] : a [unfilled] [FreeTextEntry1] : Pashto-speaking - All history obtained via  Annie\par \par 74 yo M referred for abnormal EKG - LBBB - noted prior to clearance\par \par c/o shortness of breath on minimal exertion associated with dizziness and gait imbalance - possible angina equivalent?\par \par No history of MI or CHF\par \par Echo: EF 60%, Mild AI, Mild MR, Mild TR\par \par \par 4/18/2019\par Impression:\par 1. IV Adenosine Dual Isotope Study which was negative with respect to \par symptoms and nondiagnostic due to presence of a left bundle branch block \par with respect to EKG changes.\par 2. Myocardial perfusion imaging reveals no fixed no reperfusion defects.\par 3. Gated imaging reveals septal motion consistent with a left bundle \par branch block with normal thickening and ejection fraction.\par

## 2019-04-23 NOTE — DISCUSSION/SUMMARY
[Patient Low Risk] : the patient is a low surgical risk [As per surgery] : as per surgery [Procedure Low Risk] : the procedure risk is low

## 2019-04-23 NOTE — PHYSICAL EXAM
[General Appearance - Well Developed] : well developed [General Appearance - In No Acute Distress] : no acute distress [Normal Conjunctiva] : the conjunctiva exhibited no abnormalities [Eyelids - No Xanthelasma] : the eyelids demonstrated no xanthelasmas [Respiration, Rhythm And Depth] : normal respiratory rhythm and effort [Exaggerated Use Of Accessory Muscles For Inspiration] : no accessory muscle use [Heart Rate And Rhythm] : heart rate and rhythm were normal [Auscultation Breath Sounds / Voice Sounds] : lungs were clear to auscultation bilaterally [Heart Sounds] : normal S1 and S2 [Murmurs] : no murmurs present [Abdomen Soft] : soft [Abdomen Mass (___ Cm)] : no abdominal mass palpated [Abdomen Tenderness] : non-tender [] : no hepato-splenomegaly [Cyanosis, Localized] : no localized cyanosis [Nail Clubbing] : no clubbing of the fingernails [No Skin Ulcers] : no skin ulcer [Oriented To Time, Place, And Person] : oriented to person, place, and time [Skin Color & Pigmentation] : normal skin color and pigmentation [FreeTextEntry1] : no JVD

## 2019-04-30 ENCOUNTER — FORM ENCOUNTER (OUTPATIENT)
Age: 74
End: 2019-04-30

## 2019-04-30 ENCOUNTER — APPOINTMENT (OUTPATIENT)
Dept: INTERNAL MEDICINE | Facility: CLINIC | Age: 74
End: 2019-04-30

## 2019-04-30 ENCOUNTER — OUTPATIENT (OUTPATIENT)
Dept: OUTPATIENT SERVICES | Facility: HOSPITAL | Age: 74
LOS: 1 days | Discharge: HOME | End: 2019-04-30

## 2019-04-30 VITALS
SYSTOLIC BLOOD PRESSURE: 128 MMHG | HEIGHT: 60 IN | DIASTOLIC BLOOD PRESSURE: 78 MMHG | WEIGHT: 169 LBS | BODY MASS INDEX: 33.18 KG/M2 | HEART RATE: 71 BPM

## 2019-04-30 DIAGNOSIS — Z01.818 ENCOUNTER FOR OTHER PREPROCEDURAL EXAMINATION: ICD-10-CM

## 2019-04-30 DIAGNOSIS — Z87.828 PERSONAL HISTORY OF OTHER (HEALED) PHYSICAL INJURY AND TRAUMA: Chronic | ICD-10-CM

## 2019-04-30 DIAGNOSIS — N20.0 CALCULUS OF KIDNEY: ICD-10-CM

## 2019-04-30 DIAGNOSIS — F32.9 MAJOR DEPRESSIVE DISORDER, SINGLE EPISODE, UNSPECIFIED: ICD-10-CM

## 2019-04-30 DIAGNOSIS — F33.1 MAJOR DEPRESSIVE DISORDER, RECURRENT, MODERATE: ICD-10-CM

## 2019-04-30 DIAGNOSIS — E78.5 HYPERLIPIDEMIA, UNSPECIFIED: ICD-10-CM

## 2019-04-30 NOTE — PLAN
[FreeTextEntry1] : #Nephrolithiasis\par -Scheduled for laser lithotripsy on May 14,2019\par -Follow up with urology\par -Cardiology clearance obtained\par -Medical clearance : Patient is a low risk for low risk procedure\par \par #Depression\par -asymptomatic. Denies mood symptoms\par -Continue sertraline 125mg qhs\par \par #Urinary incontinence\par -Continue adult diapers and ditropan XL 10mg\par \par #DLD\par Continue atorvastatin\par \par #OA :continue follow up with pain management \par -c/w diclofenac topical, lidocaine-prilocaine\par \par #HCM\par -Flu shot UTD\par -Colonoscopy in 2016 Aug showed 4 colonic polyps with tubular adenoma on histopath. repeat colonoscopy in Sept 2019.\par -RTC in 3 months

## 2019-04-30 NOTE — HISTORY OF PRESENT ILLNESS
[Other: _____] : [unfilled] [FreeTextEntry1] : Follow up [de-identified] : Patient is a 74 y/o Czech speaking M accompanied by son, with PMH of Osteoarthritis ( follows up with pain management), urinary incontinence (wears adult diapers, follows with urology), symptomatic renal calculi on right came here for medical clearance before surgery. Patient has a 4mm right renal stone with renal cysts, scheduled for ureteroscopy with laser lithotripsy on May 14,2019.\par No other active complaints.

## 2019-05-01 ENCOUNTER — OUTPATIENT (OUTPATIENT)
Dept: OUTPATIENT SERVICES | Facility: HOSPITAL | Age: 74
LOS: 1 days | Discharge: HOME | End: 2019-05-01
Payer: MEDICAID

## 2019-05-01 VITALS
SYSTOLIC BLOOD PRESSURE: 135 MMHG | WEIGHT: 163.36 LBS | OXYGEN SATURATION: 99 % | RESPIRATION RATE: 17 BRPM | HEIGHT: 65 IN | HEART RATE: 68 BPM | TEMPERATURE: 98 F | DIASTOLIC BLOOD PRESSURE: 78 MMHG

## 2019-05-01 DIAGNOSIS — Z01.818 ENCOUNTER FOR OTHER PREPROCEDURAL EXAMINATION: ICD-10-CM

## 2019-05-01 DIAGNOSIS — N20.2 CALCULUS OF KIDNEY WITH CALCULUS OF URETER: ICD-10-CM

## 2019-05-01 DIAGNOSIS — Z87.828 PERSONAL HISTORY OF OTHER (HEALED) PHYSICAL INJURY AND TRAUMA: Chronic | ICD-10-CM

## 2019-05-01 LAB
ALBUMIN SERPL ELPH-MCNC: 4.5 G/DL — SIGNIFICANT CHANGE UP (ref 3.5–5.2)
ALP SERPL-CCNC: 113 U/L — SIGNIFICANT CHANGE UP (ref 30–115)
ALT FLD-CCNC: 22 U/L — SIGNIFICANT CHANGE UP (ref 0–41)
ANION GAP SERPL CALC-SCNC: 13 MMOL/L — SIGNIFICANT CHANGE UP (ref 7–14)
APPEARANCE UR: CLEAR — SIGNIFICANT CHANGE UP
APTT BLD: 34.5 SEC — SIGNIFICANT CHANGE UP (ref 27–39.2)
AST SERPL-CCNC: 21 U/L — SIGNIFICANT CHANGE UP (ref 0–41)
BACTERIA # UR AUTO: ABNORMAL /HPF
BASOPHILS # BLD AUTO: 0.11 K/UL — SIGNIFICANT CHANGE UP (ref 0–0.2)
BASOPHILS NFR BLD AUTO: 1.1 % — HIGH (ref 0–1)
BILIRUB SERPL-MCNC: 0.6 MG/DL — SIGNIFICANT CHANGE UP (ref 0.2–1.2)
BILIRUB UR-MCNC: NEGATIVE — SIGNIFICANT CHANGE UP
BUN SERPL-MCNC: 13 MG/DL — SIGNIFICANT CHANGE UP (ref 10–20)
CALCIUM SERPL-MCNC: 9.4 MG/DL — SIGNIFICANT CHANGE UP (ref 8.5–10.1)
CHLORIDE SERPL-SCNC: 103 MMOL/L — SIGNIFICANT CHANGE UP (ref 98–110)
CO2 SERPL-SCNC: 27 MMOL/L — SIGNIFICANT CHANGE UP (ref 17–32)
COLOR SPEC: YELLOW — SIGNIFICANT CHANGE UP
COMMENT - URINE: SIGNIFICANT CHANGE UP
CREAT SERPL-MCNC: 0.9 MG/DL — SIGNIFICANT CHANGE UP (ref 0.7–1.5)
DIFF PNL FLD: NEGATIVE — SIGNIFICANT CHANGE UP
EOSINOPHIL # BLD AUTO: 0.4 K/UL — SIGNIFICANT CHANGE UP (ref 0–0.7)
EOSINOPHIL NFR BLD AUTO: 4.1 % — SIGNIFICANT CHANGE UP (ref 0–8)
GLUCOSE SERPL-MCNC: 104 MG/DL — HIGH (ref 70–99)
GLUCOSE UR QL: NEGATIVE MG/DL — SIGNIFICANT CHANGE UP
HCT VFR BLD CALC: 52.5 % — HIGH (ref 42–52)
HGB BLD-MCNC: 17.1 G/DL — SIGNIFICANT CHANGE UP (ref 14–18)
IMM GRANULOCYTES NFR BLD AUTO: 0.4 % — HIGH (ref 0.1–0.3)
INR BLD: 1.02 RATIO — SIGNIFICANT CHANGE UP (ref 0.65–1.3)
KETONES UR-MCNC: NEGATIVE — SIGNIFICANT CHANGE UP
LEUKOCYTE ESTERASE UR-ACNC: ABNORMAL
LYMPHOCYTES # BLD AUTO: 3.14 K/UL — SIGNIFICANT CHANGE UP (ref 1.2–3.4)
LYMPHOCYTES # BLD AUTO: 31.9 % — SIGNIFICANT CHANGE UP (ref 20.5–51.1)
MCHC RBC-ENTMCNC: 29.6 PG — SIGNIFICANT CHANGE UP (ref 27–31)
MCHC RBC-ENTMCNC: 32.6 G/DL — SIGNIFICANT CHANGE UP (ref 32–37)
MCV RBC AUTO: 90.8 FL — SIGNIFICANT CHANGE UP (ref 80–94)
MONOCYTES # BLD AUTO: 0.62 K/UL — HIGH (ref 0.1–0.6)
MONOCYTES NFR BLD AUTO: 6.3 % — SIGNIFICANT CHANGE UP (ref 1.7–9.3)
NEUTROPHILS # BLD AUTO: 5.53 K/UL — SIGNIFICANT CHANGE UP (ref 1.4–6.5)
NEUTROPHILS NFR BLD AUTO: 56.2 % — SIGNIFICANT CHANGE UP (ref 42.2–75.2)
NITRITE UR-MCNC: NEGATIVE — SIGNIFICANT CHANGE UP
NRBC # BLD: 0 /100 WBCS — SIGNIFICANT CHANGE UP (ref 0–0)
PH UR: 6 — SIGNIFICANT CHANGE UP (ref 5–8)
PLATELET # BLD AUTO: 153 K/UL — SIGNIFICANT CHANGE UP (ref 130–400)
POTASSIUM SERPL-MCNC: 4.5 MMOL/L — SIGNIFICANT CHANGE UP (ref 3.5–5)
POTASSIUM SERPL-SCNC: 4.5 MMOL/L — SIGNIFICANT CHANGE UP (ref 3.5–5)
PROT SERPL-MCNC: 6.7 G/DL — SIGNIFICANT CHANGE UP (ref 6–8)
PROT UR-MCNC: NEGATIVE MG/DL — SIGNIFICANT CHANGE UP
PROTHROM AB SERPL-ACNC: 11.7 SEC — SIGNIFICANT CHANGE UP (ref 9.95–12.87)
RBC # BLD: 5.78 M/UL — SIGNIFICANT CHANGE UP (ref 4.7–6.1)
RBC # FLD: 12.7 % — SIGNIFICANT CHANGE UP (ref 11.5–14.5)
SODIUM SERPL-SCNC: 143 MMOL/L — SIGNIFICANT CHANGE UP (ref 135–146)
SP GR SPEC: 1.02 — SIGNIFICANT CHANGE UP (ref 1.01–1.03)
UROBILINOGEN FLD QL: 0.2 MG/DL — SIGNIFICANT CHANGE UP (ref 0.2–0.2)
WBC # BLD: 9.84 K/UL — SIGNIFICANT CHANGE UP (ref 4.8–10.8)
WBC # FLD AUTO: 9.84 K/UL — SIGNIFICANT CHANGE UP (ref 4.8–10.8)
WBC UR QL: NEGATIVE — SIGNIFICANT CHANGE UP

## 2019-05-01 PROCEDURE — 93010 ELECTROCARDIOGRAM REPORT: CPT

## 2019-05-01 PROCEDURE — 71046 X-RAY EXAM CHEST 2 VIEWS: CPT | Mod: 26,76

## 2019-05-01 NOTE — H&P PST ADULT - LANGUAGE ASSISTANCE NEEDED
No-Patient/Caregiver offered and refused free interpretation services./EXPLAINED TO PT'S SON IMPORTANCE OF  PHONE. PT CONTINUES TO REFUSE.

## 2019-05-01 NOTE — H&P PST ADULT - RS GEN PE MLT RESP DETAILS PC
normal/good air movement/respirations non-labored/airway patent/breath sounds equal/no chest wall tenderness/clear to auscultation bilaterally

## 2019-05-01 NOTE — H&P PST ADULT - REASON FOR ADMISSION
PT PRESENTS TO PAST WITH NO SOB, CP, PALPITATIONS, DYSURIA, UTI OR URI AT PRESENT.   PT ABLE TO WALK UP 2-3 FLIGHTS OF STEPS WITH NO SOB.  AS PER THE PT, THIS IS HIS/HER COMPLETE MEDICAL AND SURGICAL HX, INCLUDING MEDICATIONS PRESCRIBED AND OVER THE COUNTER  PT PRESENTS TO PAST WITH H/O- KIDNEY & URETERAL STONES. PT PRESENTS TO PAST WITH NO SOB, CP, PALPITATIONS, DYSURIA, UTI OR URI AT PRESENT.   PT ABLE TO WALK UP 2-3 FLIGHTS OF STEPS WITH NO SOB.  AS PER THE PT, THIS IS HIS/HER COMPLETE MEDICAL AND SURGICAL HX, INCLUDING MEDICATIONS PRESCRIBED AND OVER THE COUNTER  PT PRESENTS TO PAST WITH H/O-4mm RIGHT  KIDNEY  STONE.

## 2019-05-01 NOTE — H&P PST ADULT - NSANTHOSAYNRD_GEN_A_CORE
No. HARSHAD screening performed.  STOP BANG Legend: 0-2 = LOW Risk; 3-4 = INTERMEDIATE Risk; 5-8 = HIGH Risk

## 2019-05-01 NOTE — H&P PST ADULT - NSICDXPASTMEDICALHX_GEN_ALL_CORE_FT
PAST MEDICAL HISTORY:  Acute depression     Arrhythmia left BBB    Chronic low back pain with sciatica     History of hyperlipidemia     HTN (hypertension)

## 2019-05-02 ENCOUNTER — OTHER (OUTPATIENT)
Age: 74
End: 2019-05-02

## 2019-05-03 LAB
CULTURE RESULTS: SIGNIFICANT CHANGE UP
SPECIMEN SOURCE: SIGNIFICANT CHANGE UP

## 2019-05-07 DIAGNOSIS — F32.9 MAJOR DEPRESSIVE DISORDER, SINGLE EPISODE, UNSPECIFIED: ICD-10-CM

## 2019-05-07 DIAGNOSIS — I10 ESSENTIAL (PRIMARY) HYPERTENSION: ICD-10-CM

## 2019-05-13 NOTE — ASU PATIENT PROFILE, ADULT - PMH
Acute depression    Arrhythmia  left BBB  Chronic low back pain with sciatica    History of hyperlipidemia    HTN (hypertension)

## 2019-05-14 ENCOUNTER — APPOINTMENT (OUTPATIENT)
Dept: UROLOGY | Facility: HOSPITAL | Age: 74
End: 2019-05-14
Payer: MEDICAID

## 2019-05-14 ENCOUNTER — OUTPATIENT (OUTPATIENT)
Dept: OUTPATIENT SERVICES | Facility: HOSPITAL | Age: 74
LOS: 1 days | Discharge: HOME | End: 2019-05-14

## 2019-05-14 VITALS — HEART RATE: 77 BPM | DIASTOLIC BLOOD PRESSURE: 71 MMHG | TEMPERATURE: 96 F | SYSTOLIC BLOOD PRESSURE: 116 MMHG

## 2019-05-14 VITALS — SYSTOLIC BLOOD PRESSURE: 147 MMHG | RESPIRATION RATE: 16 BRPM | DIASTOLIC BLOOD PRESSURE: 77 MMHG | HEART RATE: 85 BPM

## 2019-05-14 DIAGNOSIS — Z87.828 PERSONAL HISTORY OF OTHER (HEALED) PHYSICAL INJURY AND TRAUMA: Chronic | ICD-10-CM

## 2019-05-14 PROCEDURE — 52356 CYSTO/URETERO W/LITHOTRIPSY: CPT | Mod: RT

## 2019-05-14 RX ORDER — SODIUM CHLORIDE 9 MG/ML
1000 INJECTION, SOLUTION INTRAVENOUS
Refills: 0 | Status: DISCONTINUED | OUTPATIENT
Start: 2019-05-14 | End: 2019-05-29

## 2019-05-14 RX ORDER — HYDROMORPHONE HYDROCHLORIDE 2 MG/ML
0.5 INJECTION INTRAMUSCULAR; INTRAVENOUS; SUBCUTANEOUS
Refills: 0 | Status: DISCONTINUED | OUTPATIENT
Start: 2019-05-14 | End: 2019-05-14

## 2019-05-14 RX ORDER — OXYCODONE AND ACETAMINOPHEN 5; 325 MG/1; MG/1
1 TABLET ORAL ONCE
Refills: 0 | Status: DISCONTINUED | OUTPATIENT
Start: 2019-05-14 | End: 2019-05-14

## 2019-05-14 RX ORDER — ONDANSETRON 8 MG/1
4 TABLET, FILM COATED ORAL ONCE
Refills: 0 | Status: DISCONTINUED | OUTPATIENT
Start: 2019-05-14 | End: 2019-05-29

## 2019-05-14 RX ORDER — TAMSULOSIN HYDROCHLORIDE 0.4 MG/1
1 CAPSULE ORAL
Qty: 30 | Refills: 0
Start: 2019-05-14 | End: 2019-06-12

## 2019-05-14 RX ORDER — CEPHALEXIN 500 MG
1 CAPSULE ORAL
Qty: 12 | Refills: 0
Start: 2019-05-14 | End: 2019-05-16

## 2019-05-14 RX ORDER — ACETAMINOPHEN WITH CODEINE 300MG-30MG
1 TABLET ORAL
Qty: 12 | Refills: 0
Start: 2019-05-14 | End: 2019-05-16

## 2019-05-14 RX ORDER — MULTIVIT-MIN/FERROUS GLUCONATE 9 MG/15 ML
1 LIQUID (ML) ORAL
Qty: 0 | Refills: 0 | DISCHARGE

## 2019-05-14 RX ORDER — ATORVASTATIN CALCIUM 80 MG/1
1 TABLET, FILM COATED ORAL
Qty: 0 | Refills: 0 | DISCHARGE

## 2019-05-14 RX ORDER — MORPHINE SULFATE 50 MG/1
2 CAPSULE, EXTENDED RELEASE ORAL
Refills: 0 | Status: DISCONTINUED | OUTPATIENT
Start: 2019-05-14 | End: 2019-05-14

## 2019-05-14 RX ADMIN — SODIUM CHLORIDE 100 MILLILITER(S): 9 INJECTION, SOLUTION INTRAVENOUS at 09:19

## 2019-05-14 NOTE — ASU PREOP CHECKLIST - 1.
PATIENT SPEAKS [employer] ONLY   , PRYIANK, # [id number] INTERPRETED AND PATIENT REQUEST HIS SON, LISA, INTERPRET FOR HIM INSTEAD OF USING  PHONE.

## 2019-05-14 NOTE — BRIEF OPERATIVE NOTE - NSICDXBRIEFPROCEDURE_GEN_ALL_CORE_FT
PROCEDURES:  Cystoscopy with bilateral retrograde pyelography with insertion of ureteral stent 14-May-2019 08:51:44  Matt Donohue  Laser lithotripsy, renal calculus 14-May-2019 08:51:22  Matt Donohue

## 2019-05-14 NOTE — ASU DISCHARGE PLAN (ADULT/PEDIATRIC) - COMMENTS
Follow up in urology clinic on [address] [place] [address] on May 16th --- clinic will call to confirm time between 9am and 11am

## 2019-05-16 ENCOUNTER — APPOINTMENT (OUTPATIENT)
Dept: UROLOGY | Facility: CLINIC | Age: 74
End: 2019-05-16
Payer: MEDICAID

## 2019-05-16 ENCOUNTER — OUTPATIENT (OUTPATIENT)
Dept: OUTPATIENT SERVICES | Facility: HOSPITAL | Age: 74
LOS: 1 days | Discharge: HOME | End: 2019-05-16

## 2019-05-16 VITALS
BODY MASS INDEX: 33.18 KG/M2 | SYSTOLIC BLOOD PRESSURE: 114 MMHG | HEIGHT: 60 IN | HEART RATE: 90 BPM | DIASTOLIC BLOOD PRESSURE: 79 MMHG | WEIGHT: 169 LBS

## 2019-05-16 DIAGNOSIS — N20.0 CALCULUS OF KIDNEY: ICD-10-CM

## 2019-05-16 DIAGNOSIS — N28.1 CYST OF KIDNEY, ACQUIRED: ICD-10-CM

## 2019-05-16 DIAGNOSIS — R39.89 OTHER SYMPTOMS AND SIGNS INVOLVING THE GENITOURINARY SYSTEM: ICD-10-CM

## 2019-05-16 DIAGNOSIS — Z87.828 PERSONAL HISTORY OF OTHER (HEALED) PHYSICAL INJURY AND TRAUMA: Chronic | ICD-10-CM

## 2019-05-16 PROCEDURE — 99213 OFFICE O/P EST LOW 20 MIN: CPT

## 2019-05-16 NOTE — PHYSICAL EXAM
[General Appearance - Well Developed] : well developed [General Appearance - Well Nourished] : well nourished [Normal Appearance] : normal appearance [Well Groomed] : well groomed [General Appearance - In No Acute Distress] : no acute distress [Heart Rate And Rhythm] : Heart rate and rhythm were normal [Edema] : no peripheral edema [Respiration, Rhythm And Depth] : normal respiratory rhythm and effort [Exaggerated Use Of Accessory Muscles For Inspiration] : no accessory muscle use [Abdomen Tenderness] : non-tender [Abdomen Soft] : soft [Costovertebral Angle Tenderness] : no ~M costovertebral angle tenderness [No Prostate Nodules] : no prostate nodules [Skin Color & Pigmentation] : normal skin color and pigmentation [Normal Station and Gait] : the gait and station were normal for the patient's age [] : no rash [No Focal Deficits] : no focal deficits [Oriented To Time, Place, And Person] : oriented to person, place, and time [Mood] : the mood was normal [Affect] : the affect was normal [Not Anxious] : not anxious

## 2019-05-16 NOTE — HISTORY OF PRESENT ILLNESS
[FreeTextEntry1] : 74 y/o m referred to urology for urinary incontinence. Pt with 6 months of progressively worsening incontinence requiring the use of diapers. 2 per day.\par Had medium stream, + frequency q 1-2 hrs, + nocturia x 2 with enuresis, + urgency with incontinence, + foul odor, No dysuria, hematuria, no hesitancy , voids in small amounts, + PV fullness, + PV dribbling. \par started on ditropan last visit and states that his incontinence has improved but asks for next dose up to 10mg XL\par \par Pt also has a very firm prostate on KISHAN. PSA was 2.52 \par prostate biopsy done last visit: all 12 cores were benign\par \par Renal sonogram from August showed 4mm right renal stone and bilateral cysts\par he underwent laser lithotripsy this week and the stent was removed today \par \par Aug 2018:\par ucx = no growth\par PSA 2.52\par UA = protein, - Nit

## 2019-05-17 DIAGNOSIS — I44.7 LEFT BUNDLE-BRANCH BLOCK, UNSPECIFIED: ICD-10-CM

## 2019-05-17 DIAGNOSIS — E78.5 HYPERLIPIDEMIA, UNSPECIFIED: ICD-10-CM

## 2019-05-17 DIAGNOSIS — I10 ESSENTIAL (PRIMARY) HYPERTENSION: ICD-10-CM

## 2019-05-17 DIAGNOSIS — M54.30 SCIATICA, UNSPECIFIED SIDE: ICD-10-CM

## 2019-05-17 DIAGNOSIS — F32.89 OTHER SPECIFIED DEPRESSIVE EPISODES: ICD-10-CM

## 2019-05-17 DIAGNOSIS — N20.0 CALCULUS OF KIDNEY: ICD-10-CM

## 2019-05-28 ENCOUNTER — APPOINTMENT (OUTPATIENT)
Dept: INTERNAL MEDICINE | Facility: CLINIC | Age: 74
End: 2019-05-28

## 2019-05-28 ENCOUNTER — OUTPATIENT (OUTPATIENT)
Dept: OUTPATIENT SERVICES | Facility: HOSPITAL | Age: 74
LOS: 1 days | Discharge: HOME | End: 2019-05-28

## 2019-05-28 VITALS
WEIGHT: 170 LBS | DIASTOLIC BLOOD PRESSURE: 71 MMHG | TEMPERATURE: 98.1 F | SYSTOLIC BLOOD PRESSURE: 114 MMHG | HEIGHT: 60 IN | BODY MASS INDEX: 33.38 KG/M2 | HEART RATE: 76 BPM

## 2019-05-28 DIAGNOSIS — Z87.01 PERSONAL HISTORY OF PNEUMONIA (RECURRENT): ICD-10-CM

## 2019-05-28 DIAGNOSIS — Z87.828 PERSONAL HISTORY OF OTHER (HEALED) PHYSICAL INJURY AND TRAUMA: Chronic | ICD-10-CM

## 2019-05-28 DIAGNOSIS — N39.0 URINARY TRACT INFECTION, SITE NOT SPECIFIED: ICD-10-CM

## 2019-05-28 DIAGNOSIS — A49.9 URINARY TRACT INFECTION, SITE NOT SPECIFIED: ICD-10-CM

## 2019-05-28 RX ORDER — CIPROFLOXACIN HYDROCHLORIDE 500 MG/1
500 TABLET, FILM COATED ORAL TWICE DAILY
Qty: 14 | Refills: 1 | Status: DISCONTINUED | COMMUNITY
Start: 2019-03-28 | End: 2019-05-28

## 2019-05-28 RX ORDER — DIAPER,BRIEF,ADULT, DISPOSABLE
EACH MISCELLANEOUS
Qty: 1 | Refills: 0 | Status: COMPLETED | COMMUNITY
Start: 2018-09-24 | End: 2019-05-28

## 2019-05-28 RX ORDER — GABAPENTIN 100 MG/1
100 CAPSULE ORAL
Qty: 30 | Refills: 2 | Status: DISCONTINUED | COMMUNITY
Start: 2019-05-28 | End: 2019-05-28

## 2019-05-28 RX ORDER — LIDOCAINE AND PRILOCAINE 25; 25 MG/G; MG/G
2.5-2.5 CREAM TOPICAL
Refills: 0 | Status: COMPLETED | COMMUNITY
End: 2019-05-28

## 2019-05-28 RX ORDER — LEVOFLOXACIN 500 MG/1
500 TABLET, FILM COATED ORAL DAILY
Qty: 7 | Refills: 0 | Status: DISCONTINUED | COMMUNITY
Start: 2019-05-02 | End: 2019-05-28

## 2019-05-28 RX ORDER — ZOSTER VACCINE RECOMBINANT, ADJUVANTED 50 MCG/0.5
50 KIT INTRAMUSCULAR
Qty: 1 | Refills: 1 | Status: COMPLETED | COMMUNITY
Start: 2018-12-06 | End: 2019-05-28

## 2019-05-28 NOTE — REVIEW OF SYSTEMS
[Fatigue] : fatigue [Back Pain] : back pain [Fever] : no fever [Chills] : no chills [Night Sweats] : no night sweats [Chest Pain] : no chest pain [Lower Ext Edema] : no lower extremity edema [Palpitations] : no palpitations [Shortness Of Breath] : no shortness of breath [Wheezing] : no wheezing [Cough] : no cough [Dyspnea on Exertion] : not dyspnea on exertion [Abdominal Pain] : no abdominal pain [Nausea] : no nausea [Diarrhea] : no diarrhea [Constipation] : no constipation [Vomiting] : no vomiting [Dysuria] : no dysuria [Skin Rash] : no skin rash [Dizziness] : no dizziness [Headache] : no headache

## 2019-05-28 NOTE — ASSESSMENT
[FreeTextEntry1] : 72 yo M\par PMH: Urinary Incontinence, Osteoarthritis, Depression, Dyslipidemia\par CC: Routine follow up\par \par ASSESSMENT / PLAN:\par \par # LE Nocturnal Burning Sensation:\par - Continue Lyrica\par \par # Osteoarthritis: Continue Diclofenac gel for pain relief\par \par # Urinary Incontinence: Patient following with Urology. s/p Laser Lithotripsy and stent removal. Continue Ditropan XL and Diapers. Continue routine follow up as per Urology. \par \par # Depression: Continue Sertraline\par \par # Dyslipidemia: Continue Atorvastatin \par \par # HCM:\par - Colonoscopy (2016): 4 Colonic Polyps were visualized with tubular adenoma noted on histopathology.\par - Repeat Colonoscopy \par - Repeat Annual labs at next visit\par - RTC in 3 months \par

## 2019-05-28 NOTE — PHYSICAL EXAM
[No Acute Distress] : no acute distress [Well Nourished] : well nourished [Well Developed] : well developed [Normal Sclera/Conjunctiva] : normal sclera/conjunctiva [No Respiratory Distress] : no respiratory distress  [Clear to Auscultation] : lungs were clear to auscultation bilaterally [No Accessory Muscle Use] : no accessory muscle use [Normal Rate] : normal rate  [Regular Rhythm] : with a regular rhythm [Normal S1, S2] : normal S1 and S2 [No Edema] : there was no peripheral edema [Soft] : abdomen soft [Non Tender] : non-tender [Non-distended] : non-distended [No Rash] : no rash [No Focal Deficits] : no focal deficits [Normal Affect] : the affect was normal [de-identified] : LE extremities with mild pitting edema. Warm to touch.

## 2019-05-28 NOTE — HISTORY OF PRESENT ILLNESS
[de-identified] : 72 yo M\par PMH: Urinary Incontinence, Osteoarthritis, Depression, Dyslipidemia\par CC: Routine follow up\par History: Patient presents for routine follow up. Medication refills also requested. Patient states that since we last saw him he has been doing well. He endorse LE burning sensation when sleeping.  [FreeTextEntry1] : Routine follow up

## 2019-06-10 ENCOUNTER — RX RENEWAL (OUTPATIENT)
Age: 74
End: 2019-06-10

## 2019-06-11 ENCOUNTER — OUTPATIENT (OUTPATIENT)
Dept: OUTPATIENT SERVICES | Facility: HOSPITAL | Age: 74
LOS: 1 days | Discharge: HOME | End: 2019-06-11
Payer: MEDICAID

## 2019-06-11 DIAGNOSIS — Z87.828 PERSONAL HISTORY OF OTHER (HEALED) PHYSICAL INJURY AND TRAUMA: Chronic | ICD-10-CM

## 2019-06-11 PROCEDURE — 92014 COMPRE OPH EXAM EST PT 1/>: CPT

## 2019-06-16 PROBLEM — I73.9 CLAUDICATION: Status: ACTIVE | Noted: 2019-03-26

## 2019-07-24 ENCOUNTER — APPOINTMENT (OUTPATIENT)
Dept: UROLOGY | Facility: CLINIC | Age: 74
End: 2019-07-24
Payer: MEDICAID

## 2019-07-24 VITALS
HEART RATE: 84 BPM | DIASTOLIC BLOOD PRESSURE: 80 MMHG | HEIGHT: 60 IN | BODY MASS INDEX: 33.38 KG/M2 | WEIGHT: 170 LBS | SYSTOLIC BLOOD PRESSURE: 136 MMHG

## 2019-07-24 DIAGNOSIS — N28.1 CYST OF KIDNEY, ACQUIRED: ICD-10-CM

## 2019-07-24 DIAGNOSIS — Z12.5 ENCOUNTER FOR SCREENING FOR MALIGNANT NEOPLASM OF PROSTATE: ICD-10-CM

## 2019-07-24 PROCEDURE — 99213 OFFICE O/P EST LOW 20 MIN: CPT

## 2019-07-24 RX ORDER — OXYBUTYNIN CHLORIDE 10 MG/1
10 TABLET, EXTENDED RELEASE ORAL
Qty: 30 | Refills: 3 | Status: DISCONTINUED | COMMUNITY
Start: 2019-03-14 | End: 2019-07-24

## 2019-07-24 NOTE — HISTORY OF PRESENT ILLNESS
[FreeTextEntry1] : 72 y/o m referred to urology for urinary incontinence. Pt with 6 months of progressively worsening incontinence requiring the use of diapers. 2 per day.\par Had medium stream, + frequency q 1-2 hrs, + nocturia x 2 with enuresis, + urgency with incontinence, + foul odor, No dysuria, hematuria, no hesitancy , voids in small amounts, + PV fullness, + PV dribbling. \par started on ditropan last visit and states that his incontinence has improved but asks for next dose up to 10mg XL\par \par Pt also has a very firm prostate on KISHAN. PSA was 2.52 \par prostate biopsy done last visit: all 12 cores were benign\par \par Renal sonogram from August showed 4mm right renal stone and bilateral cysts\par he underwent laser lithotripsy \par \par Aug 2018:\par ucx = no growth\par PSA 2.52\par UA = protein, - Nit. \par \par  24 hour urine: from June 2019\par UOP = 2L\par urine oxalate 51\par uric acid high at 0.96

## 2019-07-24 NOTE — PHYSICAL EXAM
[General Appearance - Well Developed] : well developed [General Appearance - Well Nourished] : well nourished [Normal Appearance] : normal appearance [Well Groomed] : well groomed [General Appearance - In No Acute Distress] : no acute distress [Abdomen Soft] : soft [Abdomen Tenderness] : non-tender [Costovertebral Angle Tenderness] : no ~M costovertebral angle tenderness [No Prostate Nodules] : no prostate nodules [Skin Color & Pigmentation] : normal skin color and pigmentation [Heart Rate And Rhythm] : Heart rate and rhythm were normal [Edema] : no peripheral edema [Respiration, Rhythm And Depth] : normal respiratory rhythm and effort [] : no respiratory distress [Oriented To Time, Place, And Person] : oriented to person, place, and time [Exaggerated Use Of Accessory Muscles For Inspiration] : no accessory muscle use [Affect] : the affect was normal [Mood] : the mood was normal [Not Anxious] : not anxious [Normal Station and Gait] : the gait and station were normal for the patient's age [No Focal Deficits] : no focal deficits

## 2019-07-24 NOTE — ASSESSMENT
[FreeTextEntry1] : 72 y/o m referred to urology for urinary incontinence. Pt with 6 months of progressively worsening incontinence requiring the use of diapers. 2 per day. Had medium stream, + frequency q 1-2 hrs, + nocturia x 2 with enuresis, + urgency with incontinence, + foul odor, No dysuria, hematuria, no hesitancy , voids in small amounts, + PV fullness, + PV dribbling.  started on ditropan last visit and states that his incontinence has improved but asks for next dose up to 10mg XL  Pt also has a very firm prostate on KISHAN. PSA was 2.52  prostate biopsy done last visit: all 12 cores were benign  Renal sonogram from August showed 4mm right renal stone and bilateral cysts he underwent laser lithotripsy   Aug 2018: ucx = no growth PSA 2.52 UA = protein, - Nit.    24 hour urine: from June 2019 UOP = 2L urine oxalate 51 uric acid high at 0.96

## 2019-07-31 ENCOUNTER — RX RENEWAL (OUTPATIENT)
Age: 74
End: 2019-07-31

## 2019-08-13 ENCOUNTER — APPOINTMENT (OUTPATIENT)
Dept: INTERNAL MEDICINE | Facility: CLINIC | Age: 74
End: 2019-08-13

## 2019-09-05 ENCOUNTER — APPOINTMENT (OUTPATIENT)
Dept: INTERNAL MEDICINE | Facility: CLINIC | Age: 74
End: 2019-09-05

## 2019-09-11 ENCOUNTER — APPOINTMENT (OUTPATIENT)
Dept: GERIATRICS | Facility: CLINIC | Age: 74
End: 2019-09-11
Payer: MEDICAID

## 2019-09-11 ENCOUNTER — OUTPATIENT (OUTPATIENT)
Dept: OUTPATIENT SERVICES | Facility: HOSPITAL | Age: 74
LOS: 1 days | Discharge: HOME | End: 2019-09-11

## 2019-09-11 VITALS
TEMPERATURE: 98 F | BODY MASS INDEX: 34.95 KG/M2 | DIASTOLIC BLOOD PRESSURE: 86 MMHG | HEART RATE: 69 BPM | HEIGHT: 60 IN | SYSTOLIC BLOOD PRESSURE: 142 MMHG | WEIGHT: 178 LBS

## 2019-09-11 DIAGNOSIS — Z87.828 PERSONAL HISTORY OF OTHER (HEALED) PHYSICAL INJURY AND TRAUMA: Chronic | ICD-10-CM

## 2019-09-11 DIAGNOSIS — M79.661 PAIN IN RIGHT LOWER LEG: ICD-10-CM

## 2019-09-11 DIAGNOSIS — M79.662 PAIN IN RIGHT LOWER LEG: ICD-10-CM

## 2019-09-11 PROCEDURE — 99214 OFFICE O/P EST MOD 30 MIN: CPT

## 2019-09-11 NOTE — ASSESSMENT
[FreeTextEntry1] : 74 year old man with PMH Urinary Incontinence, Osteoarthritis, Depression, and Dyslipidemia presents for 3 month follow up in clinic.\par \par # LE Nocturnal Burning Sensation\par - Continue Lyrica 100 mg TID, sen by neurology in the past\par - Follow up B12, TSH\par \par # Osteoarthritis\par - Continue Diclofenac gel for pain relief\par \par # Urinary Incontinence\par - Patient following with Urology\par - s/p Laser Lithotripsy and stent removal\par - Continue Ditropan XL and Diapers\par - Continue routine follow up as per Urology. \par \par # Depression\par - Continue Sertraline\par \par # Dyslipidemia\par - Continue Atorvastatin \par \par # HCM\par - Colonoscopy (2016): 4 Colonic Polyps were visualized with tubular adenoma noted on histopathology.\par - Repeat Colonoscopy \par - Follow up labs: CBC, CMP, Lipids, B12, TSH\par - RTC in 6 months \par

## 2019-09-11 NOTE — HISTORY OF PRESENT ILLNESS
[Family Member] : family member [FreeTextEntry1] : Routine 3 month follow up [de-identified] :  #[id number]\par \par 73 year old man with PMH Urinary Incontinence, Osteoarthritis, Depression, and Dyslipidemia presents for 3 month follow up in clinic.\par \par Over the past 3 months, the patient has been in his normal state of health, no new complaints. Patient requests medication refills and would like to be checked for diabetes.

## 2019-09-11 NOTE — PHYSICAL EXAM
[No Acute Distress] : no acute distress [Well Nourished] : well nourished [Well Developed] : well developed [Normal Sclera/Conjunctiva] : normal sclera/conjunctiva [No Respiratory Distress] : no respiratory distress  [Clear to Auscultation] : lungs were clear to auscultation bilaterally [No Accessory Muscle Use] : no accessory muscle use [Normal Rate] : normal rate  [Regular Rhythm] : with a regular rhythm [Normal S1, S2] : normal S1 and S2 [No Edema] : there was no peripheral edema [Soft] : abdomen soft [Non Tender] : non-tender [Non-distended] : non-distended [No Rash] : no rash [No Focal Deficits] : no focal deficits [Normal Affect] : the affect was normal

## 2019-09-11 NOTE — REVIEW OF SYSTEMS
[Fatigue] : fatigue [Back Pain] : back pain [Dizziness] : dizziness [Fever] : no fever [Chills] : no chills [Night Sweats] : no night sweats [Chest Pain] : no chest pain [Palpitations] : no palpitations [Lower Ext Edema] : no lower extremity edema [Shortness Of Breath] : no shortness of breath [Wheezing] : no wheezing [Cough] : no cough [Dyspnea on Exertion] : not dyspnea on exertion [Abdominal Pain] : no abdominal pain [Nausea] : no nausea [Constipation] : no constipation [Diarrhea] : no diarrhea [Vomiting] : no vomiting [Dysuria] : no dysuria [Skin Rash] : no skin rash [Headache] : no headache

## 2019-09-13 DIAGNOSIS — M79.661 PAIN IN RIGHT LOWER LEG: ICD-10-CM

## 2019-09-13 DIAGNOSIS — Z00.00 ENCOUNTER FOR GENERAL ADULT MEDICAL EXAMINATION WITHOUT ABNORMAL FINDINGS: ICD-10-CM

## 2019-09-13 DIAGNOSIS — R32 UNSPECIFIED URINARY INCONTINENCE: ICD-10-CM

## 2019-09-13 DIAGNOSIS — M19.90 UNSPECIFIED OSTEOARTHRITIS, UNSPECIFIED SITE: ICD-10-CM

## 2019-09-13 LAB
ALBUMIN SERPL ELPH-MCNC: 4.7 G/DL
ALP BLD-CCNC: 104 U/L
ALT SERPL-CCNC: 16 U/L
ANION GAP SERPL CALC-SCNC: 10 MMOL/L
AST SERPL-CCNC: 18 U/L
BASOPHILS # BLD AUTO: 0.11 K/UL
BASOPHILS NFR BLD AUTO: 1 %
BILIRUB SERPL-MCNC: 0.9 MG/DL
BUN SERPL-MCNC: 13 MG/DL
CALCIUM SERPL-MCNC: 9.6 MG/DL
CHLORIDE SERPL-SCNC: 103 MMOL/L
CHOLEST SERPL-MCNC: 134 MG/DL
CHOLEST/HDLC SERPL: 3.4 RATIO
CO2 SERPL-SCNC: 28 MMOL/L
CREAT SERPL-MCNC: 0.9 MG/DL
EOSINOPHIL # BLD AUTO: 0.36 K/UL
EOSINOPHIL NFR BLD AUTO: 3.4 %
GLUCOSE SERPL-MCNC: 86 MG/DL
HCT VFR BLD CALC: 52.5 %
HDLC SERPL-MCNC: 40 MG/DL
HGB BLD-MCNC: 16.8 G/DL
IMM GRANULOCYTES NFR BLD AUTO: 0.3 %
LDLC SERPL CALC-MCNC: 80 MG/DL
LYMPHOCYTES # BLD AUTO: 2.91 K/UL
LYMPHOCYTES NFR BLD AUTO: 27.2 %
MAN DIFF?: NORMAL
MCHC RBC-ENTMCNC: 29.4 PG
MCHC RBC-ENTMCNC: 32 G/DL
MCV RBC AUTO: 91.8 FL
MONOCYTES # BLD AUTO: 0.62 K/UL
MONOCYTES NFR BLD AUTO: 5.8 %
NEUTROPHILS # BLD AUTO: 6.66 K/UL
NEUTROPHILS NFR BLD AUTO: 62.3 %
PLATELET # BLD AUTO: 164 K/UL
POTASSIUM SERPL-SCNC: 5.3 MMOL/L
PROT SERPL-MCNC: 7.4 G/DL
RBC # BLD: 5.72 M/UL
RBC # FLD: 13.4 %
SODIUM SERPL-SCNC: 141 MMOL/L
TRIGL SERPL-MCNC: 87 MG/DL
TSH SERPL-ACNC: 1.28 UIU/ML
VIT B12 SERPL-MCNC: >2000 PG/ML
WBC # FLD AUTO: 10.69 K/UL

## 2019-09-17 ENCOUNTER — OUTPATIENT (OUTPATIENT)
Dept: OUTPATIENT SERVICES | Facility: HOSPITAL | Age: 74
LOS: 1 days | Discharge: HOME | End: 2019-09-17
Payer: MEDICAID

## 2019-09-17 DIAGNOSIS — Z87.828 PERSONAL HISTORY OF OTHER (HEALED) PHYSICAL INJURY AND TRAUMA: Chronic | ICD-10-CM

## 2019-09-17 PROCEDURE — 92012 INTRM OPH EXAM EST PATIENT: CPT

## 2019-10-29 ENCOUNTER — OUTPATIENT (OUTPATIENT)
Dept: OUTPATIENT SERVICES | Facility: HOSPITAL | Age: 74
LOS: 1 days | Discharge: HOME | End: 2019-10-29
Payer: MEDICAID

## 2019-10-29 DIAGNOSIS — Z87.828 PERSONAL HISTORY OF OTHER (HEALED) PHYSICAL INJURY AND TRAUMA: Chronic | ICD-10-CM

## 2019-10-29 PROCEDURE — 92012 INTRM OPH EXAM EST PATIENT: CPT

## 2019-11-27 ENCOUNTER — OTHER (OUTPATIENT)
Age: 74
End: 2019-11-27

## 2019-12-13 NOTE — H&P PST ADULT - PRO PAIN LIFE ADAPT
What Type Of Note Output Would You Prefer (Optional)?: Standard Output Hpi Title: Evaluation of Skin Lesions How Severe Are Your Spot(S)?: mild Have Your Spot(S) Been Treated In The Past?: has not been treated none

## 2020-02-24 ENCOUNTER — APPOINTMENT (OUTPATIENT)
Dept: UROLOGY | Facility: CLINIC | Age: 75
End: 2020-02-24
Payer: MEDICAID

## 2020-02-24 DIAGNOSIS — N20.0 CALCULUS OF KIDNEY: ICD-10-CM

## 2020-02-24 PROCEDURE — 99213 OFFICE O/P EST LOW 20 MIN: CPT

## 2020-02-24 NOTE — PHYSICAL EXAM
[General Appearance - Well Developed] : well developed [General Appearance - Well Nourished] : well nourished [Normal Appearance] : normal appearance [Well Groomed] : well groomed [General Appearance - In No Acute Distress] : no acute distress [Abdomen Soft] : soft [Abdomen Tenderness] : non-tender [Costovertebral Angle Tenderness] : no ~M costovertebral angle tenderness [No Prostate Nodules] : no prostate nodules [Skin Color & Pigmentation] : normal skin color and pigmentation [Heart Rate And Rhythm] : Heart rate and rhythm were normal [Edema] : no peripheral edema [Respiration, Rhythm And Depth] : normal respiratory rhythm and effort [] : no respiratory distress [Exaggerated Use Of Accessory Muscles For Inspiration] : no accessory muscle use [Oriented To Time, Place, And Person] : oriented to person, place, and time [Mood] : the mood was normal [Affect] : the affect was normal [Not Anxious] : not anxious [No Focal Deficits] : no focal deficits [Normal Station and Gait] : the gait and station were normal for the patient's age

## 2020-02-24 NOTE — HISTORY OF PRESENT ILLNESS
[FreeTextEntry1] : 75 y/o m referred to urology for urinary incontinence. Pt with 6 months of progressively worsening incontinence requiring the use of diapers. 2 per day.  was started on oxybutynin 15mg xl\par Had medium stream, + frequency q 1-2 hrs, + nocturia x 2 with enuresis, + urgency with incontinence, + foul odor, No dysuria, hematuria, no hesitancy , voids in small amounts, + PV fullness, + PV dribbling. \par \par \par Pt also has a very firm prostate on KISHAN. PSA was 2.52 \par prostate biopsy done last visit: all 12 cores were benign\par \par Renal sonogram from August showed 4mm right renal stone and bilateral cysts\par he underwent laser lithotripsy \par \par Aug 2018:\par ucx = no growth\par PSA 2.52\par UA = protein, - Nit. \par \par  24 hour urine: from June 2019\par UOP = 2L\par urine oxalate 51\par uric acid high at 0.96. \par \par US Renal; US Bladder; - not yet done\par

## 2020-03-12 ENCOUNTER — APPOINTMENT (OUTPATIENT)
Dept: GERIATRICS | Facility: CLINIC | Age: 75
End: 2020-03-12
Payer: MEDICAID

## 2020-03-12 ENCOUNTER — OUTPATIENT (OUTPATIENT)
Dept: OUTPATIENT SERVICES | Facility: HOSPITAL | Age: 75
LOS: 1 days | Discharge: HOME | End: 2020-03-12

## 2020-03-12 VITALS
BODY MASS INDEX: 33.96 KG/M2 | TEMPERATURE: 96.4 F | SYSTOLIC BLOOD PRESSURE: 135 MMHG | DIASTOLIC BLOOD PRESSURE: 78 MMHG | HEART RATE: 65 BPM | WEIGHT: 173 LBS | HEIGHT: 60 IN

## 2020-03-12 DIAGNOSIS — Z87.828 PERSONAL HISTORY OF OTHER (HEALED) PHYSICAL INJURY AND TRAUMA: Chronic | ICD-10-CM

## 2020-03-12 PROCEDURE — 99214 OFFICE O/P EST MOD 30 MIN: CPT

## 2020-03-12 NOTE — HISTORY OF PRESENT ILLNESS
[FreeTextEntry1] : 74 year old man with PMH Urinary Incontinence, Osteoarthritis, Depression, and Dyslipidemia presents for 6 month follow up in clinic.\par \par Over the past 3 months, the patient has been in his normal state of health, no new complaints except for chronic back pain. Patient requests medication refills

## 2020-03-12 NOTE — ASSESSMENT
[FreeTextEntry1] : 74 year old man with PMH Urinary Incontinence, Osteoarthritis, Depression, and Dyslipidemia presents for 3 month follow up in clinic.\par \par \par # Osteoarthritis\par - Continue Diclofenac gel for pain relief\par -Lyrica 100mg TID, pregabalin 75TID and Vicodin\par -lidocaine-prilocaine cream\par -follow up with pain doctor in [place] \par \par #osteoporosis \par -on vit d and calcium\par \par # urge Incontinence\par - Patient following with Urology\par - s/p Laser Lithotripsy and stent removal\par - Continue Ditropan XL and Diapers\par -c/w oxybutynin and tamsulosin \par - Continue routine follow up as per Urology. (  to see  for urge incontinence \par \par # Depression\par - Continue Sertraline\par \par # Dyslipidemia\par - Continue Atorvastatin \par \par # HCM\par - Colonoscopy (2016): 4 Colonic Polyps were visualized with tubular adenoma noted on histopathology., need to follow next year for repeat colonscopy\par - Follow up labs: CBC, CMP, Lipids, TSH\par - RTC in 6 months \par

## 2020-03-12 NOTE — END OF VISIT
[] : Resident [FreeTextEntry3] : SEEN WITH JULIA TEAM\par Seeing multiple doctors - has problem with UI - going to see Josie for tis;his depression is well controlled with sertraline as per patient and son; also on multiple medications for chronic pain of back - will renew - counseled must get all medications from one source going forward and prefer that this is done with his pain management doctor; will schedule labs and RTC 3 months.

## 2020-03-12 NOTE — PHYSICAL EXAM
[Normal Oral Mucosa] : normal oral mucosa [No Oral Pallor] : no oral pallor [Neck Appearance] : the appearance of the neck was normal [Respiration, Rhythm And Depth] : normal respiratory rhythm and effort [Exaggerated Use Of Accessory Muscles For Inspiration] : no accessory muscle use [Auscultation Breath Sounds / Voice Sounds] : lungs were clear to auscultation bilaterally [Apical Impulse] : the apical impulse was normal [Heart Rate And Rhythm] : heart rate was normal and rhythm regular [Heart Sounds] : normal S1 and S2 [Murmurs] : no murmurs [Bowel Sounds] : normal bowel sounds [Abdomen Soft] : soft [Abdomen Tenderness] : non-tender [] : no hepato-splenomegaly [Abdomen Mass (___ Cm)] : no abdominal mass palpated [No CVA Tenderness] : no ~M costovertebral angle tenderness [Cranial Nerves] : cranial nerves 2-12 were intact [Deep Tendon Reflexes (DTR)] : deep tendon reflexes were 2+ and symmetric [Motor Exam] : the motor exam was normal [FreeTextEntry1] : spinal tenderness, limiting movement

## 2020-03-17 DIAGNOSIS — M54.9 DORSALGIA, UNSPECIFIED: ICD-10-CM

## 2020-03-17 DIAGNOSIS — R32 UNSPECIFIED URINARY INCONTINENCE: ICD-10-CM

## 2020-03-17 DIAGNOSIS — E55.9 VITAMIN D DEFICIENCY, UNSPECIFIED: ICD-10-CM

## 2020-03-17 DIAGNOSIS — M19.90 UNSPECIFIED OSTEOARTHRITIS, UNSPECIFIED SITE: ICD-10-CM

## 2020-03-19 RX ORDER — HYDROCODONE BITARTRATE AND ACETAMINOPHEN 5; 300 MG/1; MG/1
5-300 TABLET ORAL
Qty: 60 | Refills: 0 | Status: DISCONTINUED | COMMUNITY
Start: 2020-03-12 | End: 2020-03-19

## 2020-03-24 ENCOUNTER — APPOINTMENT (OUTPATIENT)
Dept: UROLOGY | Facility: CLINIC | Age: 75
End: 2020-03-24

## 2020-04-09 RX ORDER — HYDROCODONE BITARTRATE AND ACETAMINOPHEN 5; 325 MG/1; MG/1
5-325 TABLET ORAL 4 TIMES DAILY
Qty: 28 | Refills: 0 | Status: DISCONTINUED | COMMUNITY
Start: 2020-03-19 | End: 2020-04-09

## 2020-05-12 ENCOUNTER — APPOINTMENT (OUTPATIENT)
Dept: UROLOGY | Facility: CLINIC | Age: 75
End: 2020-05-12

## 2020-06-18 LAB
25(OH)D3 SERPL-MCNC: 29 NG/ML
ALBUMIN SERPL ELPH-MCNC: 4.2 G/DL
ALP BLD-CCNC: 117 U/L
ALT SERPL-CCNC: 11 U/L
ANION GAP SERPL CALC-SCNC: 13 MMOL/L
AST SERPL-CCNC: 16 U/L
BASOPHILS # BLD AUTO: 0.09 K/UL
BASOPHILS NFR BLD AUTO: 1 %
BILIRUB SERPL-MCNC: 0.7 MG/DL
BUN SERPL-MCNC: 15 MG/DL
CALCIUM SERPL-MCNC: 9 MG/DL
CHLORIDE SERPL-SCNC: 107 MMOL/L
CHOLEST SERPL-MCNC: 223 MG/DL
CHOLEST/HDLC SERPL: 6.6 RATIO
CO2 SERPL-SCNC: 24 MMOL/L
CREAT SERPL-MCNC: 1 MG/DL
EOSINOPHIL # BLD AUTO: 0.32 K/UL
EOSINOPHIL NFR BLD AUTO: 3.4 %
ESTIMATED AVERAGE GLUCOSE: 100 MG/DL
GLUCOSE SERPL-MCNC: 96 MG/DL
HBA1C MFR BLD HPLC: 5.1 %
HCT VFR BLD CALC: 53.5 %
HDLC SERPL-MCNC: 34 MG/DL
HGB BLD-MCNC: 16.8 G/DL
IMM GRANULOCYTES NFR BLD AUTO: 0.4 %
LDLC SERPL CALC-MCNC: 177 MG/DL
LYMPHOCYTES # BLD AUTO: 2.85 K/UL
LYMPHOCYTES NFR BLD AUTO: 30.1 %
MAN DIFF?: NORMAL
MCHC RBC-ENTMCNC: 29.1 PG
MCHC RBC-ENTMCNC: 31.4 G/DL
MCV RBC AUTO: 92.6 FL
MONOCYTES # BLD AUTO: 0.65 K/UL
MONOCYTES NFR BLD AUTO: 6.9 %
NEUTROPHILS # BLD AUTO: 5.52 K/UL
NEUTROPHILS NFR BLD AUTO: 58.2 %
PLATELET # BLD AUTO: 160 K/UL
POTASSIUM SERPL-SCNC: 4.6 MMOL/L
PROT SERPL-MCNC: 6.9 G/DL
RBC # BLD: 5.78 M/UL
RBC # FLD: 13.5 %
SODIUM SERPL-SCNC: 144 MMOL/L
TRIGL SERPL-MCNC: 147 MG/DL
TSH SERPL-ACNC: 1.99 UIU/ML
WBC # FLD AUTO: 9.47 K/UL

## 2020-07-20 ENCOUNTER — APPOINTMENT (OUTPATIENT)
Dept: GERIATRICS | Facility: HOME HEALTH | Age: 75
End: 2020-07-20
Payer: MEDICAID

## 2020-07-20 DIAGNOSIS — G62.9 POLYNEUROPATHY, UNSPECIFIED: ICD-10-CM

## 2020-07-20 DIAGNOSIS — K59.00 CONSTIPATION, UNSPECIFIED: ICD-10-CM

## 2020-07-20 DIAGNOSIS — M48.07 SPINAL STENOSIS, LUMBOSACRAL REGION: ICD-10-CM

## 2020-07-20 PROCEDURE — 99213 OFFICE O/P EST LOW 20 MIN: CPT | Mod: 95,GC

## 2020-07-20 NOTE — SOCIAL HISTORY
[No falls in past year] : Patient reported no falls in the past year [Independent] : toileting [Some assistance needed] : walking

## 2020-07-21 VITALS — TEMPERATURE: 97.7 F | RESPIRATION RATE: 16 BRPM

## 2020-07-21 NOTE — REASON FOR VISIT
[Home] : at home, [unfilled] , at the time of the visit. [Family Member] : family member [Other Location: e.g. Home (Enter Location, City,State)___] : at [unfilled] [Verbal consent obtained from patient] : the patient, [unfilled] [Acute] : an acute visit [FreeTextEntry4] : son, granddaughter

## 2020-07-21 NOTE — REVIEW OF SYSTEMS
[Feeling Poorly] : feeling poorly [Constipation] : constipation [Incontinence] : incontinence [As Noted in HPI] : as noted in HPI [Negative] : Cardiovascular [Feeling Tired] : not feeling tired [Abdominal Pain] : no abdominal pain [Vomiting] : no vomiting [Melena] : no melena [Hesitancy] : no urinary hesitancy [Dysuria] : no dysuria

## 2020-07-21 NOTE — PHYSICAL EXAM
[General Appearance - Alert] : alert [General Appearance - In No Acute Distress] : in no acute distress [Sclera] : the sclera and conjunctiva were normal [Neck Appearance] : the appearance of the neck was normal [] : no respiratory distress [Respiration, Rhythm And Depth] : normal respiratory rhythm and effort [Edema] : there was no peripheral edema [Exaggerated Use Of Accessory Muscles For Inspiration] : no accessory muscle use

## 2020-07-21 NOTE — ASSESSMENT
[FreeTextEntry1] : Severe back pain with spinal stenosis and radicular pain - will refill Vicodin and increase to 3 times per day, trial of Celebrex 200 mg daily for synergy, c/w PT, options and outcomes discussed, education and counselling provided \par  use Colace 200 mg q hs, with Miralax as needed\par \par pulse ox to picked up by granddaughter [Daily physical exercise as tolerated] : Daily physical exercise as tolerated [Pain Management] : pain management [Medication Management] : medication management

## 2020-07-21 NOTE — HISTORY OF PRESENT ILLNESS
[0] : 2) Feeling down, depressed, or hopeless: Not at all [FreeTextEntry1] : Patient reports severe back pain both hips and radiating down the legs, significant gait impairment due to pain, he takes Vicodin twice a day with some relief of pain, but still not able to walk well. He received intraspinal injections in the past with minimal improvement, he is not interested in any surgical interventions, was just started with PT. \par

## 2020-07-22 ENCOUNTER — APPOINTMENT (OUTPATIENT)
Dept: GERIATRICS | Facility: CLINIC | Age: 75
End: 2020-07-22

## 2020-08-04 ENCOUNTER — OUTPATIENT (OUTPATIENT)
Dept: OUTPATIENT SERVICES | Facility: HOSPITAL | Age: 75
LOS: 1 days | Discharge: HOME | End: 2020-08-04

## 2020-08-04 DIAGNOSIS — Z87.828 PERSONAL HISTORY OF OTHER (HEALED) PHYSICAL INJURY AND TRAUMA: Chronic | ICD-10-CM

## 2020-08-06 DIAGNOSIS — M17.0 BILATERAL PRIMARY OSTEOARTHRITIS OF KNEE: ICD-10-CM

## 2020-08-06 DIAGNOSIS — M54.89 OTHER DORSALGIA: ICD-10-CM

## 2020-08-06 DIAGNOSIS — R26.89 OTHER ABNORMALITIES OF GAIT AND MOBILITY: ICD-10-CM

## 2020-08-11 ENCOUNTER — APPOINTMENT (OUTPATIENT)
Dept: UROLOGY | Facility: CLINIC | Age: 75
End: 2020-08-11
Payer: MEDICAID

## 2020-08-11 PROCEDURE — 99213 OFFICE O/P EST LOW 20 MIN: CPT

## 2020-08-11 NOTE — REVIEW OF SYSTEMS
[Fever] : no fever [Feeling Poorly] : not feeling poorly [Shortness Of Breath] : no shortness of breath [Chest Pain] : no chest pain [Cough] : no cough [Constipation] : no constipation [Dysuria] : no dysuria [Diarrhea] : no diarrhea [Confused] : no confusion

## 2020-08-11 NOTE — ASSESSMENT
[FreeTextEntry1] : Worsening urinary incontinence. Discussed fully with patient and his son with NP Yessenia lopes.  Risk benefits alternatives regarding urodynamic testing discussed they're agreeable to proceed. Depending on results considering change in medication, nerve stimulation, Botox, prostate procedure

## 2020-08-11 NOTE — PHYSICAL EXAM
[General Appearance - In No Acute Distress] : no acute distress [] : no respiratory distress [Not Anxious] : not anxious [Respiration, Rhythm And Depth] : normal respiratory rhythm and effort [Oriented To Time, Place, And Person] : oriented to person, place, and time [Normal Station and Gait] : the gait and station were normal for the patient's age

## 2020-08-11 NOTE — HISTORY OF PRESENT ILLNESS
[FreeTextEntry1] : 74 year-old with urgency incontinence, worsening over the last 6 months. He uses 2 diapers per day despite oxybutynin ER 15. His frequency urgency, enuresis, sensation of incomplete voiding. He has a history of prostate biopsy which was benign and history of tiny right renal stone bilateral kidney cysts

## 2020-08-28 ENCOUNTER — RESULT CHARGE (OUTPATIENT)
Age: 75
End: 2020-08-28

## 2020-08-28 ENCOUNTER — APPOINTMENT (OUTPATIENT)
Dept: UROLOGY | Facility: CLINIC | Age: 75
End: 2020-08-28
Payer: MEDICAID

## 2020-08-28 VITALS — BODY MASS INDEX: 33.96 KG/M2 | TEMPERATURE: 97.2 F | WEIGHT: 173 LBS | HEIGHT: 60 IN

## 2020-08-28 LAB
BILIRUB UR QL STRIP: NORMAL
CLARITY UR: CLEAR
COLLECTION METHOD: NORMAL
GLUCOSE UR-MCNC: NORMAL
HCG UR QL: NORMAL EU/DL
HGB UR QL STRIP.AUTO: NORMAL
KETONES UR-MCNC: NORMAL
LEUKOCYTE ESTERASE UR QL STRIP: NORMAL
NITRITE UR QL STRIP: NORMAL
PH UR STRIP: 6
PROT UR STRIP-MCNC: NORMAL
SP GR UR STRIP: 1.02

## 2020-08-28 PROCEDURE — 51728 CYSTOMETROGRAM W/VP: CPT

## 2020-08-28 PROCEDURE — 51741 ELECTRO-UROFLOWMETRY FIRST: CPT

## 2020-08-28 PROCEDURE — 51784 ANAL/URINARY MUSCLE STUDY: CPT

## 2020-08-29 ENCOUNTER — RX RENEWAL (OUTPATIENT)
Age: 75
End: 2020-08-29

## 2020-09-03 ENCOUNTER — RX RENEWAL (OUTPATIENT)
Age: 75
End: 2020-09-03

## 2020-09-08 ENCOUNTER — TRANSCRIPTION ENCOUNTER (OUTPATIENT)
Age: 75
End: 2020-09-08

## 2020-09-16 ENCOUNTER — APPOINTMENT (OUTPATIENT)
Dept: UROLOGY | Facility: CLINIC | Age: 75
End: 2020-09-16

## 2020-12-02 ENCOUNTER — RX RENEWAL (OUTPATIENT)
Age: 75
End: 2020-12-02

## 2021-02-15 NOTE — ASU PATIENT PROFILE, ADULT - MEDICATION ADMINISTRATION INFO, PROFILE
Dr. Natasha Vargas,     Pt is requesting intermittent FMLA due to recurrent vaginal bleeding. Pt is requesting 1-2 flare up per month until the end of of her pregnancy. Do you support?     Thank you.   Lencho Latif no concerns

## 2021-02-25 ENCOUNTER — RX RENEWAL (OUTPATIENT)
Age: 76
End: 2021-02-25

## 2021-02-26 ENCOUNTER — RX RENEWAL (OUTPATIENT)
Age: 76
End: 2021-02-26

## 2021-07-05 ENCOUNTER — INPATIENT (INPATIENT)
Facility: HOSPITAL | Age: 76
LOS: 1 days | Discharge: HOME | End: 2021-07-07
Attending: STUDENT IN AN ORGANIZED HEALTH CARE EDUCATION/TRAINING PROGRAM | Admitting: STUDENT IN AN ORGANIZED HEALTH CARE EDUCATION/TRAINING PROGRAM
Payer: MEDICAID

## 2021-07-05 VITALS
DIASTOLIC BLOOD PRESSURE: 60 MMHG | WEIGHT: 199.96 LBS | HEART RATE: 47 BPM | TEMPERATURE: 99 F | OXYGEN SATURATION: 95 % | RESPIRATION RATE: 18 BRPM | HEIGHT: 65 IN | SYSTOLIC BLOOD PRESSURE: 142 MMHG

## 2021-07-05 DIAGNOSIS — Z87.828 PERSONAL HISTORY OF OTHER (HEALED) PHYSICAL INJURY AND TRAUMA: Chronic | ICD-10-CM

## 2021-07-05 LAB
ALBUMIN SERPL ELPH-MCNC: 3.8 G/DL — SIGNIFICANT CHANGE UP (ref 3.5–5.2)
ALP SERPL-CCNC: 98 U/L — SIGNIFICANT CHANGE UP (ref 30–115)
ALT FLD-CCNC: 34 U/L — SIGNIFICANT CHANGE UP (ref 0–41)
ANION GAP SERPL CALC-SCNC: 8 MMOL/L — SIGNIFICANT CHANGE UP (ref 7–14)
AST SERPL-CCNC: 40 U/L — SIGNIFICANT CHANGE UP (ref 0–41)
BASOPHILS # BLD AUTO: 0.09 K/UL — SIGNIFICANT CHANGE UP (ref 0–0.2)
BASOPHILS NFR BLD AUTO: 0.8 % — SIGNIFICANT CHANGE UP (ref 0–1)
BILIRUB SERPL-MCNC: 0.8 MG/DL — SIGNIFICANT CHANGE UP (ref 0.2–1.2)
BUN SERPL-MCNC: 17 MG/DL — SIGNIFICANT CHANGE UP (ref 10–20)
CALCIUM SERPL-MCNC: 8.8 MG/DL — SIGNIFICANT CHANGE UP (ref 8.5–10.1)
CHLORIDE SERPL-SCNC: 106 MMOL/L — SIGNIFICANT CHANGE UP (ref 98–110)
CO2 SERPL-SCNC: 26 MMOL/L — SIGNIFICANT CHANGE UP (ref 17–32)
CREAT SERPL-MCNC: 1.2 MG/DL — SIGNIFICANT CHANGE UP (ref 0.7–1.5)
EOSINOPHIL # BLD AUTO: 0.32 K/UL — SIGNIFICANT CHANGE UP (ref 0–0.7)
EOSINOPHIL NFR BLD AUTO: 2.9 % — SIGNIFICANT CHANGE UP (ref 0–8)
GLUCOSE SERPL-MCNC: 87 MG/DL — SIGNIFICANT CHANGE UP (ref 70–99)
HCT VFR BLD CALC: 46.8 % — SIGNIFICANT CHANGE UP (ref 42–52)
HGB BLD-MCNC: 15 G/DL — SIGNIFICANT CHANGE UP (ref 14–18)
IMM GRANULOCYTES NFR BLD AUTO: 0.5 % — HIGH (ref 0.1–0.3)
LYMPHOCYTES # BLD AUTO: 2.79 K/UL — SIGNIFICANT CHANGE UP (ref 1.2–3.4)
LYMPHOCYTES # BLD AUTO: 25.7 % — SIGNIFICANT CHANGE UP (ref 20.5–51.1)
MAGNESIUM SERPL-MCNC: 2 MG/DL — SIGNIFICANT CHANGE UP (ref 1.8–2.4)
MCHC RBC-ENTMCNC: 28.4 PG — SIGNIFICANT CHANGE UP (ref 27–31)
MCHC RBC-ENTMCNC: 32.1 G/DL — SIGNIFICANT CHANGE UP (ref 32–37)
MCV RBC AUTO: 88.6 FL — SIGNIFICANT CHANGE UP (ref 80–94)
MONOCYTES # BLD AUTO: 0.74 K/UL — HIGH (ref 0.1–0.6)
MONOCYTES NFR BLD AUTO: 6.8 % — SIGNIFICANT CHANGE UP (ref 1.7–9.3)
NEUTROPHILS # BLD AUTO: 6.88 K/UL — HIGH (ref 1.4–6.5)
NEUTROPHILS NFR BLD AUTO: 63.3 % — SIGNIFICANT CHANGE UP (ref 42.2–75.2)
NRBC # BLD: 0 /100 WBCS — SIGNIFICANT CHANGE UP (ref 0–0)
PLATELET # BLD AUTO: 147 K/UL — SIGNIFICANT CHANGE UP (ref 130–400)
POTASSIUM SERPL-MCNC: 4.6 MMOL/L — SIGNIFICANT CHANGE UP (ref 3.5–5)
POTASSIUM SERPL-SCNC: 4.6 MMOL/L — SIGNIFICANT CHANGE UP (ref 3.5–5)
PROT SERPL-MCNC: 6.1 G/DL — SIGNIFICANT CHANGE UP (ref 6–8)
RBC # BLD: 5.28 M/UL — SIGNIFICANT CHANGE UP (ref 4.7–6.1)
RBC # FLD: 14.2 % — SIGNIFICANT CHANGE UP (ref 11.5–14.5)
SODIUM SERPL-SCNC: 140 MMOL/L — SIGNIFICANT CHANGE UP (ref 135–146)
TROPONIN T SERPL-MCNC: <0.01 NG/ML — SIGNIFICANT CHANGE UP
WBC # BLD: 10.87 K/UL — HIGH (ref 4.8–10.8)
WBC # FLD AUTO: 10.87 K/UL — HIGH (ref 4.8–10.8)

## 2021-07-05 PROCEDURE — 99222 1ST HOSP IP/OBS MODERATE 55: CPT

## 2021-07-05 PROCEDURE — 93010 ELECTROCARDIOGRAM REPORT: CPT

## 2021-07-05 PROCEDURE — 99285 EMERGENCY DEPT VISIT HI MDM: CPT

## 2021-07-05 PROCEDURE — 99291 CRITICAL CARE FIRST HOUR: CPT | Mod: 57

## 2021-07-05 RX ORDER — DOCUSATE SODIUM 100 MG
2 CAPSULE ORAL
Qty: 0 | Refills: 0 | DISCHARGE

## 2021-07-05 RX ORDER — DICLOFENAC SODIUM 30 MG/G
0 GEL TOPICAL
Qty: 0 | Refills: 0 | DISCHARGE

## 2021-07-05 RX ORDER — SERTRALINE 25 MG/1
1 TABLET, FILM COATED ORAL
Qty: 0 | Refills: 0 | DISCHARGE

## 2021-07-05 RX ORDER — ENOXAPARIN SODIUM 100 MG/ML
40 INJECTION SUBCUTANEOUS DAILY
Refills: 0 | Status: DISCONTINUED | OUTPATIENT
Start: 2021-07-05 | End: 2021-07-06

## 2021-07-05 RX ORDER — PANTOPRAZOLE SODIUM 20 MG/1
40 TABLET, DELAYED RELEASE ORAL
Refills: 0 | Status: DISCONTINUED | OUTPATIENT
Start: 2021-07-05 | End: 2021-07-07

## 2021-07-05 RX ORDER — OXYBUTYNIN CHLORIDE 5 MG
1 TABLET ORAL
Qty: 0 | Refills: 0 | DISCHARGE

## 2021-07-05 RX ORDER — POLYMYXIN B SULF/TRIMETHOPRIM 10000-1/ML
1 DROPS OPHTHALMIC (EYE) ONCE
Refills: 0 | Status: COMPLETED | OUTPATIENT
Start: 2021-07-05 | End: 2021-07-05

## 2021-07-05 RX ORDER — CELECOXIB 200 MG/1
1 CAPSULE ORAL
Qty: 0 | Refills: 0 | DISCHARGE

## 2021-07-05 RX ORDER — ATORVASTATIN CALCIUM 80 MG/1
20 TABLET, FILM COATED ORAL AT BEDTIME
Refills: 0 | Status: DISCONTINUED | OUTPATIENT
Start: 2021-07-05 | End: 2021-07-07

## 2021-07-05 RX ORDER — LIDOCAINE AND PRILOCAINE CREAM 25; 25 MG/G; MG/G
1 CREAM TOPICAL
Qty: 0 | Refills: 0 | DISCHARGE

## 2021-07-05 RX ORDER — TAMSULOSIN HYDROCHLORIDE 0.4 MG/1
0.4 CAPSULE ORAL AT BEDTIME
Refills: 0 | Status: DISCONTINUED | OUTPATIENT
Start: 2021-07-05 | End: 2021-07-07

## 2021-07-05 NOTE — H&P ADULT - NSICDXPASTMEDICALHX_GEN_ALL_CORE_FT
PAST MEDICAL HISTORY:  Acute depression     Arrhythmia     Chronic low back pain with sciatica     History of hyperlipidemia     HTN (hypertension)

## 2021-07-05 NOTE — ED ADULT NURSE NOTE - DOES PATIENT HAVE ADVANCE DIRECTIVE
No
Bill As?: Malignant Destruction
Bill 56442 For Specimen Handling/Conveyance To Laboratory?: no
Post-Care Instructions: I reviewed with the patient in detail post-care instructions. Patient is to keep the biopsy site dry overnight, and then apply plain petrolatum (Vaseline ointment) twice daily until healed. Patient may apply hydrogen peroxide soaks to remove any crusting.
Detail Level: Detailed
Billing Type: Third-Party Bill
Size Of Lesion In Cm (Optional): 0.6
Size Of Lesion After Curettage: 1
Destruction Type: electrodesiccation
Biopsy Type: H and E
Number Of Curettages: 3
Consent: Written consent was obtained and risks were reviewed including but not limited to scarring, infection, bleeding, scabbing, incomplete removal, nerve damage and allergy to anesthesia.
Accession #: [id number]
Anesthesia Type: 1% lidocaine with epinephrine
Lab Facility: [id number]
Wound Care: Petrolatum
Lab: [id number]
Notification Instructions: Patient will be notified of biopsy results. However, patient instructed to call the office if not contacted within 2 weeks.
Accession #: [id number]
Size Of Lesion In Cm (Optional): 0.3
Size Of Lesion After Curettage: 0.7
Accession #: [id number]
Size Of Lesion After Curettage: 1.4
Size Of Lesion In Cm (Optional): 1.1

## 2021-07-05 NOTE — H&P ADULT - NSHPREVIEWOFSYSTEMS_GEN_ALL_CORE
CONSTITUTIONAL: No weakness, fevers or chills; No headaches  EYES: No visual changes, eye pain, or discharge  ENT: No vertigo; No ear pain or change in hearing; No sore throat or difficulty swallowing  NECK: No pain or stiffness  RESPIRATORY: No cough, wheezing, or hemoptysis; No shortness of breath  CARDIOVASCULAR: No chest pain or palpitations  GASTROINTESTINAL: No abdominal or epigastric pain; No nausea, vomiting, or hematemesis; No diarrhea or constipation; No melena or hematochezia  GENITOURINARY: No dysuria, frequency or hematuria  MUSCULOSKELETAL: No joint pain, no muscle pain, no weakness  NEUROLOGICAL: No numbness or weakness  SKIN: No itching or rashes CONSTITUTIONAL: Weakness +ve  EYES: Eye pain +ve   ENT: No vertigo; No ear pain or change in hearing  NECK: No pain or stiffness  RESPIRATORY: Shortness of breath +ve  CARDIOVASCULAR: No chest pain or palpitations  GASTROINTESTINAL: No abdominal or epigastric pain; No nausea, vomiting, or hematemesis; No diarrhea or constipation; No melena or hematochezia  GENITOURINARY: No dysuria, frequency or hematuria  MUSCULOSKELETAL: Back pain +ve  NEUROLOGICAL: Dizziness +ve

## 2021-07-05 NOTE — CONSULT NOTE ADULT - ASSESSMENT
IMPRESSION  - 2:1 AVB, symptomatic, hemodynamically stable  - LBBB (old) / IVCD (new)        PLAN  - check tsh/t4, lyme serology  - check probnp  - check tte  - transcut pads in place  - if no reversible causes found, will likely need ppm.  eps to follow in am.  IMPRESSION  - 2:1 AVB, symptomatic, hemodynamically stable  - LBBB (old) / IVCD (new)        PLAN  - check tsh/t4, lyme serology  - check probnp  - check tte  - transcut pads in place  - if no reversible causes found, will likely need ppm.  eps to follow in am.   - npo after mn for possible ppm tomorrow

## 2021-07-05 NOTE — ED PROVIDER NOTE - NS ED ROS FT
Constitutional: (-) diaphoresis/fever. +generalized weakness  Eyes/ENT: +R eye pain see HPI  Cardiovascular: (-) chest pain/palpitations  Respiratory: (-) cough  Gastrointestinal: (-) vomiting, (-) diarrhea  : (-) dysuria   Musculoskeletal: (-) joint pain  Integumentary: (-) rash  Neurological: (-) LOC  Allergic/Immunologic: (-) pruritus

## 2021-07-05 NOTE — H&P ADULT - HISTORY OF PRESENT ILLNESS
74 YO M with PMHx HLD, BPH,b/l cataract surgery, chronic back pain presented to the ED for R eye pain/blurry vision that started yesterday and generalized weakness and dizziness for the past couple of weeks  Collateral history obtained from the son at bedside. Patient denied any history of trauma to eye. No recent falls. Wears reading glasses only but reports his R eye vision has always been worse than Left.   He also reports having generalized weakness and dizziness for the last couple of weeks. Uses 1 pillow to sleep at night but wakes up in the middle of the night short of breath  He was told his heart rate was "slow" in the past and he went to a cardiologist, but never followed up.   Vaccinated for COVID ([employer]) in May 2021.   74 YO M with PMHx HLD, BPH,b/l cataract surgery, chronic back pain presented to the ED for R eye pain/blurry vision that started yesterday and generalized weakness and dizziness for the past couple of weeks  Collateral history obtained from the son at bedside. Patient denied any history of trauma to eye. No recent falls. Wears reading glasses only but reports his R eye vision has always been worse than Left.   He also reports having generalized weakness and dizziness for the last couple of weeks. Uses 1 pillow to sleep at night but wakes up in the middle of the night short of breath  He was told his heart rate was "slow" in the past and he went to a cardiologist, but never followed up.   Vaccinated for COVID ([employer]) in May 2021.  In the ED VS were sig for a HR of 36. EKG showed 2:1 AV block. Approved for CCU  Bedside eye exam consistent with corneal abrasion    74 YO M with PMHx HLD, BPH, b/l cataract surgery, chronic back pain presented to the ED for R eye pain/blurry vision that started yesterday and generalized weakness and dizziness for the past couple of weeks  Collateral history obtained from the son at bedside. Patient denied any history of trauma to eye. No recent falls. Wears reading glasses only but reports his R eye vision has always been worse than Left.   He also reports having generalized weakness and dizziness for the last couple of weeks. Uses 1 pillow to sleep at night but wakes up in the middle of the night short of breath  He was told his heart rate was "slow" in the past and he went to a cardiologist, but never followed up.   Vaccinated for COVID ([employer]) in May 2021.  In the ED VS were sig for a HR of 36. EKG showed 2:1 AV block. Approved for CCU  Bedside eye exam consistent with corneal abrasion

## 2021-07-05 NOTE — ED ADULT NURSE NOTE - OBJECTIVE STATEMENT
Patient presents with right eye pain and blurriness. As per EKG, patient has new diagnosis of heart block and bradycardia. Denies c.p or sob at this time.

## 2021-07-05 NOTE — CONSULT NOTE ADULT - ATTENDING COMMENTS
- Echo  - Pacemaker vs BiV pacing  vs Biv ICD depending upon echo  - CCU admit until device received.

## 2021-07-05 NOTE — ED PROVIDER NOTE - OBJECTIVE STATEMENT
75M PMHx HL on atorvastatin, bl cataract surgery, and chronic back pain presents for R eye pain/blurry vision that started yesterday and generalized weakness x many years. Pt accompanied by son who provides ancillary hx. Denies all trauma to eye, no recent falls. Wears reading glasses only but reports his R eye vision has always been worse than L. Pt reports he has been "weaker" for many years now. Reports he was told his heart rate was "slow" in the past and he went to a cardiologist, but never followed up. Unable to provide additional details regarding cardiac hx but denies ever having MI/stroke. No chest pain/palpitations, no numbness/tingling, no abd pain, n/v/d. No fever/chills, no URI sxs. 75M PMHx HL on atorvastatin, bl cataract surgery, and chronic back pain presents for R eye pain/blurry vision that started yesterday and generalized weakness x many years. Pt accompanied by son who provides ancillary hx. Denies all trauma to eye, no recent falls. Wears reading glasses only but reports his R eye vision has always been worse than L. Pt reports he has been "weaker" for many years now. Reports he was told his heart rate was "slow" in the past and he went to a cardiologist, but never followed up. Unable to provide additional details regarding cardiac hx but denies ever having MI/stroke. No chest pain/palpitations, no numbness/tingling, no abd pain, n/v/d. No fever/chills, no URI sxs. Vaccinated for COVID ([employer]) in May 2021.

## 2021-07-05 NOTE — H&P ADULT - NSHPLABSRESULTS_GEN_ALL_CORE
VITAL SIGNS: Last 24 Hours  T(C): 37 (05 Jul 2021 19:00), Max: 37 (05 Jul 2021 19:00)  T(F): 98.6 (05 Jul 2021 19:00), Max: 98.6 (05 Jul 2021 19:00)  HR: 36 (05 Jul 2021 21:23) (36 - 47)  BP: 142/60 (05 Jul 2021 19:00) (142/60 - 142/60)  BP(mean): --  RR: 18 (05 Jul 2021 19:00) (18 - 18)  SpO2: 95% (05 Jul 2021 19:00) (95% - 95%)    LABS:                        15.0   10.87 )-----------( 147      ( 05 Jul 2021 21:15 )             46.8     07-05    140  |  106  |  17  ----------------------------<  87  4.6   |  26  |  1.2    Ca    8.8      05 Jul 2021 21:15  Mg     2.0     07-05    TPro  6.1  /  Alb  3.8  /  TBili  0.8  /  DBili  x   /  AST  40  /  ALT  34  /  AlkPhos  98  07-05          Troponin T, Serum: <0.01 ng/mL (07-05-21 @ 21:15)      CARDIAC MARKERS ( 05 Jul 2021 21:15 )  x     / <0.01 ng/mL / x     / x     / x          RADIOLOGY:

## 2021-07-05 NOTE — H&P ADULT - NSHPPHYSICALEXAM_GEN_ALL_CORE
CONSTITUTIONAL: No acute distress, well-developed, well-groomed, AAOx3  HEAD: Atraumatic, normocephalic  EYES: EOM intact, PERRLA, conjunctiva and sclera clear  ENT: Supple, no masses, no thyromegaly, no bruits, no JVD; moist mucous membranes  PULMONARY: Clear to auscultation bilaterally; no wheezes, rales, or rhonchi  CARDIOVASCULAR: Regular rate and rhythm; no murmurs, rubs, or gallops  GASTROINTESTINAL: Soft, non-tender, non-distended; bowel sounds present  MUSCULOSKELETAL: 2+ peripheral pulses; no clubbing, no cyanosis, no edema  NEUROLOGY: non-focal  SKIN: No rashes or lesions; warm and dry CONSTITUTIONAL: AAOx3  HEAD: Atraumatic, normocephalic  EYES: R eye redness   ENT: no JVD; moist mucous membranes  PULMONARY: Dec BS  CARDIOVASCULAR: Regular rate and rhythm; caroline  GASTROINTESTINAL: Soft, non-tender, non-distended;  MUSCULOSKELETAL: 1+ edema  NEUROLOGY: non-focal

## 2021-07-05 NOTE — CONSULT NOTE ADULT - SUBJECTIVE AND OBJECTIVE BOX
Outpt cardiologist:  none    HPI:    76 yo M,    pmh DL, spinal surgery / sciatica  no prior cardiac history  pw R eye pain.  incidentally found to have 2:1 AVB.     upon further questioning,  pt admits to feeling lightheaded / dizzy on exertion / walking,   with sob when sleeping / orthopnea over past several days-weeks.      No chest pain, no rashes, no hx thyroid issues.          PAST MEDICAL & SURGICAL HISTORY  HTN (hypertension)    History of hyperlipidemia    Chronic low back pain with sciatica    Acute depression    Arrhythmia  left BBB    History of back injury  sx lower back 1986        FAMILY HISTORY:  FAMILY HISTORY:      SOCIAL HISTORY:  Social History:      ALLERGIES:  No Known Allergies      MEDICATIONS:  trimethoprim/polymyxin Solution 1 Drop(s) Right EYE Once    PRN:      HOME MEDICATIONS:  Home Medications:  atorvastatin 20 mg oral tablet: 1 tab(s) orally once a day (14 May 2019 06:55)  Calcium 600+D Plus Minerals oral tablet, chewable: 1 tab(s) orally once a day (14 May 2019 06:55)  diclofenac 1% topical gel: Apply topically to affected area once a day, As Needed  advised hold 7 days prior (14 May 2019 06:55)  lidocaine-prilocaine 2.5%-2.5% topical cream: Apply topically to affected area once (14 May 2019 06:55)  Lyrica 100 mg oral capsule: 1 cap(s) orally 2 times a day (14 May 2019 06:55)  oxybutynin 5 mg/24 hours oral tablet, extended release: 1 tab(s) orally once a day (14 May 2019 06:55)  sertraline 100 mg oral tablet: 1 tab(s) orally 2 times a day (14 May 2019 06:55)      VITALS:   T(F): 98.6 ( @ 19:00), Max: 98.6 ( @ 19:00)  HR: 36 ( @ 21:23) (36 - 47)  BP: 142/60 ( @ 19:00) (142/60 - 142/60)  BP(mean): --  RR: 18 ( @ 19:00) (18 - 18)  SpO2: 95% ( @ 19:00) (95% - 95%)    I&O's Summary      REVIEW OF SYSTEMS:  CONSTITUTIONAL: No weakness, fevers or chills  HEENT: No visual changes, neck/ear pain  RESPIRATORY: No cough, sob  CARDIOVASCULAR: See HPI  GASTROINTESTINAL: No abdominal pain. No nausea, vomiting, diarrhea   GENITOURINARY: No dysuria, frequency or hematuria  NEUROLOGICAL: No new focal deficits  SKIN: No new rashes    PHYSICAL EXAM:  General: Not in distress.  Non-toxic appearing.   HEENT: EOMI  Cardio: regular, S1, S2, no murmur  Pulm: B/L BS.  No wheezing / crackles / rales  Abdomen: Soft, non-tender, non-distended. Normoactive bowel sounds  Extremities: mild pitting edema L > R  Neuro: A&O x3. No focal deficits    LABS:                        15.0   10.87 )-----------( 147      ( 2021 21:15 )             46.8     07    140  |  106  |  17  ----------------------------<  87  4.6   |  26  |  1.2    Ca    8.8      2021 21:15  Mg     2.0     -    TPro  6.1  /  Alb  3.8  /  TBili  0.8  /  DBili  x   /  AST  40  /  ALT  34  /  AlkPhos  98  07-      Troponin T, Serum: <0.01 ng/mL (21 @ 21:15)    CARDIAC MARKERS ( 2021 21:15 )  x     / <0.01 ng/mL / x     / x     / x            Troponin trend:        EC Lead ECG:   Ventricular Rate 46 BPM    Atrial Rate 92 BPM    P-R Interval 190 ms    QRS Duration 140 ms    Q-T Interval 494 ms    QTC Calculation(Bazett) 432 ms    P Axis 66 degrees    R Axis 110 degrees    T Axis 20 degrees    Diagnosis Line Sinus rhythm [id number] degree A-V block with 2:1 A-V conduction  Non-specific intra-ventricular conduction block  Lateral infarct , age undetermined  Abnormal ECG    Confirmed by RICHIE WATERS MD ([id number]) on 2021 8:40:37 PM ( @ 19:13)      TELEMETRY EVENTS:

## 2021-07-05 NOTE — H&P ADULT - ASSESSMENT
IMPRESSION:  - 2:1 AV block; symptomatic (episodes of dizziness for months)   - Suspected new onset CHF; PND +ve.  - R eye corneal abrasion     PLAN:    CNS: Avoid CNS Depressants     HEENT: Oral care. Aspiration precautions     PULMONARY: HOB @ 45. Monitor Pulse Ox. Keep > 93%. Supplement as needed.    CARDIOVASCULAR:   - 2:1 AV block on EKG and telemetry. EP eval  - Keep pacing pads on. Atropine PRN  - Follow TSH and lyme serologies   - Follow pro-BNP. 2D Echo. Chest X-ray  - Daily Weight. Strict I&Os. Keep I < O    GI: GI prophylaxis. DASH/TLC diet    RENAL: Avoid nephrotoxic agents     INFECTIOUS DISEASE: Follow lyme serologies     HEMATOLOGICAL: DVT prophylaxis (Lovenox)    ENDOCRINE: Monitor FS. Insulin protocol if needed. HbA1C    MUSCULOSKELETAL: Bedrest     CODE STATUS: Full Code    DISPOSITION: Admit to CCU 76 YO M with PMHx HLD, BPH, b/l cataract surgery, chronic back pain presented to the ED for R eye pain/blurry vision that started yesterday and generalized weakness and dizziness for the past couple of weeks    IMPRESSION:  - 2:1 AV block; symptomatic (episodes of dizziness for months)   - Suspected new onset CHF; PND +ve.  - R eye corneal abrasion   - CKD stage II v/s BRIANNE  - Hx of chronic back pain    PLAN:    CNS: Avoid CNS Depressants     HEENT: Oral care. Aspiration precautions. Continue Polytrim drops for corneal abrasion.    PULMONARY: HOB @ 45. Monitor Pulse Ox. Keep > 93%. Supplement as needed.    CARDIOVASCULAR:   - 2:1 AV block on EKG and telemetry. Also noted old LBBB. EP eval  - Keep pacing pads on. Atropine PRN  - Follow TSH, T4 and lyme serologies   - Follow pro-BNP. 2D Echo. Chest X-ray  - Daily Weight. Strict I&Os. Keep I < O    GI: GI prophylaxis. DASH/TLC diet    RENAL: Avoid nephrotoxic agents. Monitor Cr daily.     INFECTIOUS DISEASE: Follow lyme serologies     HEMATOLOGICAL: DVT prophylaxis (Lovenox)    ENDOCRINE: Monitor FS. Insulin protocol if needed. HbA1C    MUSCULOSKELETAL: Bedrest. Holding Celecoxib and Lyrica for now     CODE STATUS: Full Code    DISPOSITION: Admit to CCU 76 YO M with PMHx HLD, BPH, b/l cataract surgery, chronic back pain presented to the ED for R eye pain/blurry vision that started yesterday and generalized weakness and dizziness for the past couple of weeks    IMPRESSION:  - 2:1 AV block; symptomatic (episodes of dizziness for months)   - Suspected new onset CHF; PND +ve.  - R eye corneal abrasion   - CKD stage II v/s BRIANNE  - Hx of chronic back pain    PLAN:    CNS: Avoid CNS Depressants     HEENT: Oral care. Aspiration precautions. Continue Polytrim drops for corneal abrasion.    PULMONARY: HOB @ 45. Monitor Pulse Ox. Keep > 93%. Supplement as needed.    CARDIOVASCULAR:   - 2:1 AV block on EKG and telemetry. Also noted old LBBB. EP eval  - Keep pacing pads on. Atropine PRN  - Follow TSH, T4 and lyme serologies   - Follow pro-BNP. 2D Echo. Chest X-ray  - Daily Weight. Strict I&Os. Keep I < O    GI: GI prophylaxis. DASH/TLC diet. NPO for possible PPM in AM    RENAL: Avoid nephrotoxic agents. Monitor Cr daily.     INFECTIOUS DISEASE: Follow lyme serologies     HEMATOLOGICAL: DVT prophylaxis (Lovenox)    ENDOCRINE: Monitor FS. Insulin protocol if needed. HbA1C    MUSCULOSKELETAL: Bedrest. Holding Celecoxib and Lyrica for now     CODE STATUS: Full Code    DISPOSITION: Admit to CCU 74 YO M with PMHx HLD, BPH, b/l cataract surgery, chronic back pain presented to the ED for R eye pain/blurry vision that started yesterday and generalized weakness and dizziness for the past couple of weeks    IMPRESSION:  - 2:1 AV block; symptomatic (episodes of dizziness for months)   - Suspected new onset CHF; PND +ve.  - R eye corneal abrasion   - CKD stage II v/s BRIANNE  - Hx of chronic back pain    PLAN:    CNS: Avoid CNS Depressants     HEENT: Oral care. Aspiration precautions. Continue Polytrim drops for corneal abrasion.    PULMONARY: HOB @ 45. Monitor Pulse Ox. Keep > 93%. Supplement as needed.    CARDIOVASCULAR:   - 2:1 AV block on EKG and telemetry. Also noted old LBBB. EP eval  - Keep pacing pads on.   - Follow TSH, T4 and lyme serologies   - Follow pro-BNP. 2D Echo. Chest X-ray  - Daily Weight. Strict I&Os. Keep I < O  - Keeping npo for possible PPM today  - EPS follow up    GI: GI prophylaxis. DASH/TLC diet. NPO for possible PPM in AM    RENAL: Avoid nephrotoxic agents. Monitor Cr daily.     INFECTIOUS DISEASE: Follow lyme serologies     HEMATOLOGICAL: DVT prophylaxis (Lovenox)    ENDOCRINE: Monitor FS. Insulin protocol if needed. HbA1C    MUSCULOSKELETAL: Bedrest. Holding Celecoxib and Lyrica for now     CODE STATUS: Full Code    DISPOSITION: Admit to CCU

## 2021-07-05 NOTE — ED PROVIDER NOTE - PROGRESS NOTE DETAILS
Eliazar: Cardiology consulted (Dr. Alfaro) - will see pt.   Pt bradycardic to 36bpm, moved to telemetry bed Eliazar: Pt to be admitted to CCU per Dr. Alfaro (under Dr. Metz).  Pt currently on cardiac monitor with pads in place - HR ranging 35-40. BP stable 140s/60s, mentating normally. No chest pain/palpitations.  Pt found to have corneal abrasion on fluroscein stain exam - should continue on polytrim drops inpatient. I endorsed to CCU senior resident Dr. Walton.

## 2021-07-05 NOTE — ED ADULT NURSE NOTE - NSIMPLEMENTINTERV_GEN_ALL_ED
Implemented All Universal Safety Interventions:  [place] to call system. Call bell, personal items and telephone within reach. Instruct patient to call for assistance. Room bathroom lighting operational. Non-slip footwear when patient is off stretcher. Physically safe environment: no spills, clutter or unnecessary equipment. Stretcher in lowest position, wheels locked, appropriate side rails in place.

## 2021-07-05 NOTE — ED ADULT NURSE NOTE - CHIEF COMPLAINT QUOTE
pt came to ED with his son, c/o "being tired", fatigue throughout the day   pt also c/o right eye pain and blurry vision since yesterday

## 2021-07-05 NOTE — ED PROVIDER NOTE - PHYSICAL EXAMINATION
VITAL SIGNS: I have reviewed nursing notes and confirm.  CONSTITUTIONAL: elderly man laying in stretcher in nad  SKIN: Warm dry, normal skin turgor  HEAD: NCAT  EYES: EOMI, PERRL, no scleral icterus. peripheral vision intact bl. visual acuity OS 20/25 OD 20/70.   ENT: Moist mucous membranes, normal pharynx with no erythema or exudates  NECK: Supple; non tender. Full ROM. No cervical LAD  CARD: bradycardic, regular rhythm. no murmurs, rubs or gallops  RESP: clear to ausculation b/l.  No rales, rhonchi, or wheezing.  ABD: soft, non-tender, non-distended, no rebound or guarding.   EXT: Full ROM, no bony tenderness, no pedal edema, no calf tenderness  NEURO: normal motor. normal sensory. CN II-XII grossly intact.  PSYCH: Cooperative, appropriate.

## 2021-07-05 NOTE — ED PROVIDER NOTE - ATTENDING CONTRIBUTION TO CARE
I personally evaluated the patient. I reviewed the Resident’s or Physician Assistant’s note (as assigned above), and agree with the findings and plan except as documented in my note.    75 M PMHx HL on atorvastatin, bl cataract surgery, and chronic back pain presents w generalized weakness and r sided eye pain after rubbing it today. Pt accompanied by son who provides ancillary hx. Denies all trauma to eye, no recent falls.  No focal weakness. No rash, fevers.     CONSTITUTIONAL: Well-developed; well-nourished; in no acute distress. Sitting up and providing appropriate history and physical examination  SKIN: skin exam is warm and dry, no acute rash.  HEAD: Normocephalic; atraumatic.  EYES: PERRL, 3 mm bilateral, no nystagmus, EOM intact; corneal abrasion in the r eye on fluorescein stain. Neg Gelacio. 20/70 in the R eye, 20/40 L eye (baseline according to the patient)   ENT: No nasal discharge; airway clear.  NECK: Supple; non tender.+ full passive ROM in all directions. No JVD  CARD: S1, S2 normal; no murmurs, gallops, or rubs. Regular rate and rhythm. + Symmetric Strong Pulses  RESP: No wheezes, rales or rhonchi. Good air movement bilaterally  ABD: soft; non-distended; non-tender. No Rebound, No Gaurding, No signs of peritnitis, No CVA tenderness  EXT: Normal ROM. No clubbing, cyanosis or edema. Dp and Pt Pulses intact. Cap refill less than 3 seconds  NEURO: CN 2-12 intact, normal finger to nose, normal romberg, stable gait, no sensory or motor deficits, Alert, oriented, grossly unremarkable. No Focal deficits. GCS 15. NIH 0  PSYCH: Cooperative, appropriate.      Plan- 2:1 block on ECG- will consult cardio, labs, pads, reassess

## 2021-07-05 NOTE — ED ADULT TRIAGE NOTE - CHIEF COMPLAINT QUOTE
pt came to ED with his son, pt c/o right eye pain and blurry vision since yesterday pt came to ED with his son, c/o "being tired", fatigue throughout the day   pt also c/o right eye pain and blurry vision since yesterday

## 2021-07-06 LAB
A1C WITH ESTIMATED AVERAGE GLUCOSE RESULT: 5.3 % — SIGNIFICANT CHANGE UP (ref 4–5.6)
ALBUMIN SERPL ELPH-MCNC: 3.8 G/DL — SIGNIFICANT CHANGE UP (ref 3.5–5.2)
ALP SERPL-CCNC: 101 U/L — SIGNIFICANT CHANGE UP (ref 30–115)
ALT FLD-CCNC: 33 U/L — SIGNIFICANT CHANGE UP (ref 0–41)
ANION GAP SERPL CALC-SCNC: 8 MMOL/L — SIGNIFICANT CHANGE UP (ref 7–14)
AST SERPL-CCNC: 28 U/L — SIGNIFICANT CHANGE UP (ref 0–41)
BASOPHILS # BLD AUTO: 0.08 K/UL — SIGNIFICANT CHANGE UP (ref 0–0.2)
BASOPHILS NFR BLD AUTO: 0.7 % — SIGNIFICANT CHANGE UP (ref 0–1)
BILIRUB SERPL-MCNC: 1.2 MG/DL — SIGNIFICANT CHANGE UP (ref 0.2–1.2)
BUN SERPL-MCNC: 14 MG/DL — SIGNIFICANT CHANGE UP (ref 10–20)
CALCIUM SERPL-MCNC: 8.7 MG/DL — SIGNIFICANT CHANGE UP (ref 8.5–10.1)
CHLORIDE SERPL-SCNC: 105 MMOL/L — SIGNIFICANT CHANGE UP (ref 98–110)
CO2 SERPL-SCNC: 27 MMOL/L — SIGNIFICANT CHANGE UP (ref 17–32)
CREAT SERPL-MCNC: 1.1 MG/DL — SIGNIFICANT CHANGE UP (ref 0.7–1.5)
EOSINOPHIL # BLD AUTO: 0.32 K/UL — SIGNIFICANT CHANGE UP (ref 0–0.7)
EOSINOPHIL NFR BLD AUTO: 2.8 % — SIGNIFICANT CHANGE UP (ref 0–8)
ESTIMATED AVERAGE GLUCOSE: 105 MG/DL — SIGNIFICANT CHANGE UP (ref 68–114)
GLUCOSE SERPL-MCNC: 91 MG/DL — SIGNIFICANT CHANGE UP (ref 70–99)
HCT VFR BLD CALC: 47.6 % — SIGNIFICANT CHANGE UP (ref 42–52)
HGB BLD-MCNC: 15.6 G/DL — SIGNIFICANT CHANGE UP (ref 14–18)
IMM GRANULOCYTES NFR BLD AUTO: 0.4 % — HIGH (ref 0.1–0.3)
LYMPHOCYTES # BLD AUTO: 19.1 % — LOW (ref 20.5–51.1)
LYMPHOCYTES # BLD AUTO: 2.17 K/UL — SIGNIFICANT CHANGE UP (ref 1.2–3.4)
MAGNESIUM SERPL-MCNC: 2.1 MG/DL — SIGNIFICANT CHANGE UP (ref 1.8–2.4)
MCHC RBC-ENTMCNC: 29.1 PG — SIGNIFICANT CHANGE UP (ref 27–31)
MCHC RBC-ENTMCNC: 32.8 G/DL — SIGNIFICANT CHANGE UP (ref 32–37)
MCV RBC AUTO: 88.8 FL — SIGNIFICANT CHANGE UP (ref 80–94)
MONOCYTES # BLD AUTO: 0.78 K/UL — HIGH (ref 0.1–0.6)
MONOCYTES NFR BLD AUTO: 6.9 % — SIGNIFICANT CHANGE UP (ref 1.7–9.3)
NEUTROPHILS # BLD AUTO: 7.98 K/UL — HIGH (ref 1.4–6.5)
NEUTROPHILS NFR BLD AUTO: 70.1 % — SIGNIFICANT CHANGE UP (ref 42.2–75.2)
NRBC # BLD: 0 /100 WBCS — SIGNIFICANT CHANGE UP (ref 0–0)
NT-PROBNP SERPL-SCNC: 640 PG/ML — HIGH (ref 0–300)
PLATELET # BLD AUTO: 127 K/UL — LOW (ref 130–400)
POTASSIUM SERPL-MCNC: 4.2 MMOL/L — SIGNIFICANT CHANGE UP (ref 3.5–5)
POTASSIUM SERPL-SCNC: 4.2 MMOL/L — SIGNIFICANT CHANGE UP (ref 3.5–5)
PROT SERPL-MCNC: 6.2 G/DL — SIGNIFICANT CHANGE UP (ref 6–8)
RAPID RVP RESULT: SIGNIFICANT CHANGE UP
RBC # BLD: 5.36 M/UL — SIGNIFICANT CHANGE UP (ref 4.7–6.1)
RBC # FLD: 14.3 % — SIGNIFICANT CHANGE UP (ref 11.5–14.5)
SARS-COV-2 RNA SPEC QL NAA+PROBE: SIGNIFICANT CHANGE UP
SODIUM SERPL-SCNC: 140 MMOL/L — SIGNIFICANT CHANGE UP (ref 135–146)
WBC # BLD: 11.38 K/UL — HIGH (ref 4.8–10.8)
WBC # FLD AUTO: 11.38 K/UL — HIGH (ref 4.8–10.8)

## 2021-07-06 PROCEDURE — 99231 SBSQ HOSP IP/OBS SF/LOW 25: CPT

## 2021-07-06 PROCEDURE — 93010 ELECTROCARDIOGRAM REPORT: CPT

## 2021-07-06 PROCEDURE — 33208 INSRT HEART PM ATRIAL & VENT: CPT | Mod: KX

## 2021-07-06 PROCEDURE — 93306 TTE W/DOPPLER COMPLETE: CPT | Mod: 26

## 2021-07-06 PROCEDURE — 71045 X-RAY EXAM CHEST 1 VIEW: CPT | Mod: 26

## 2021-07-06 PROCEDURE — 71045 X-RAY EXAM CHEST 1 VIEW: CPT | Mod: 26,77

## 2021-07-06 RX ORDER — HALOPERIDOL DECANOATE 100 MG/ML
5 INJECTION INTRAMUSCULAR ONCE
Refills: 0 | Status: DISCONTINUED | OUTPATIENT
Start: 2021-07-06 | End: 2021-07-06

## 2021-07-06 RX ORDER — DIPHENHYDRAMINE HCL 50 MG
50 CAPSULE ORAL ONCE
Refills: 0 | Status: COMPLETED | OUTPATIENT
Start: 2021-07-06 | End: 2021-07-06

## 2021-07-06 RX ORDER — VANCOMYCIN HCL 1 G
1500 VIAL (EA) INTRAVENOUS ONCE
Refills: 0 | Status: COMPLETED | OUTPATIENT
Start: 2021-07-06 | End: 2021-07-06

## 2021-07-06 RX ORDER — VANCOMYCIN HCL 1 G
1000 VIAL (EA) INTRAVENOUS ONCE
Refills: 0 | Status: COMPLETED | OUTPATIENT
Start: 2021-07-06 | End: 2021-07-06

## 2021-07-06 RX ORDER — HALOPERIDOL DECANOATE 100 MG/ML
5 INJECTION INTRAMUSCULAR ONCE
Refills: 0 | Status: COMPLETED | OUTPATIENT
Start: 2021-07-06 | End: 2021-07-06

## 2021-07-06 RX ORDER — VANCOMYCIN HCL 1 G
1000 VIAL (EA) INTRAVENOUS EVERY 12 HOURS
Refills: 0 | Status: COMPLETED | OUTPATIENT
Start: 2021-07-07 | End: 2021-07-07

## 2021-07-06 RX ADMIN — PANTOPRAZOLE SODIUM 40 MILLIGRAM(S): 20 TABLET, DELAYED RELEASE ORAL at 06:27

## 2021-07-06 RX ADMIN — Medication 1 DROP(S): at 00:39

## 2021-07-06 RX ADMIN — TAMSULOSIN HYDROCHLORIDE 0.4 MILLIGRAM(S): 0.4 CAPSULE ORAL at 21:28

## 2021-07-06 RX ADMIN — HALOPERIDOL DECANOATE 5 MILLIGRAM(S): 100 INJECTION INTRAMUSCULAR at 16:58

## 2021-07-06 RX ADMIN — ATORVASTATIN CALCIUM 20 MILLIGRAM(S): 80 TABLET, FILM COATED ORAL at 21:28

## 2021-07-06 RX ADMIN — Medication 50 MILLIGRAM(S): at 16:58

## 2021-07-06 RX ADMIN — Medication 2 MILLIGRAM(S): at 16:59

## 2021-07-06 RX ADMIN — Medication 300 MILLIGRAM(S): at 13:58

## 2021-07-06 RX ADMIN — Medication 250 MILLIGRAM(S): at 17:01

## 2021-07-06 NOTE — PRE PROCEDURE NOTE - PRE PROCEDURE EVALUATION
I have personally seen and examined the patient. I agree with the history and physical//consult//progress note, which I have reviewed and noted any changes below.     Pre-op Diagnosis: 2:1 AV Block, Dizziness    Procedure: PPM Implant

## 2021-07-07 ENCOUNTER — TRANSCRIPTION ENCOUNTER (OUTPATIENT)
Age: 76
End: 2021-07-07

## 2021-07-07 ENCOUNTER — APPOINTMENT (OUTPATIENT)
Dept: OPHTHALMOLOGY | Facility: CLINIC | Age: 76
End: 2021-07-07

## 2021-07-07 ENCOUNTER — OUTPATIENT (OUTPATIENT)
Dept: OUTPATIENT SERVICES | Facility: HOSPITAL | Age: 76
LOS: 1 days | Discharge: HOME | End: 2021-07-07
Payer: MEDICAID

## 2021-07-07 VITALS — OXYGEN SATURATION: 96 %

## 2021-07-07 DIAGNOSIS — Z87.828 PERSONAL HISTORY OF OTHER (HEALED) PHYSICAL INJURY AND TRAUMA: Chronic | ICD-10-CM

## 2021-07-07 PROBLEM — I49.9 CARDIAC ARRHYTHMIA, UNSPECIFIED: Chronic | Status: ACTIVE | Noted: 2019-03-21

## 2021-07-07 LAB
ALBUMIN SERPL ELPH-MCNC: 4 G/DL — SIGNIFICANT CHANGE UP (ref 3.5–5.2)
ALP SERPL-CCNC: 109 U/L — SIGNIFICANT CHANGE UP (ref 30–115)
ALT FLD-CCNC: 30 U/L — SIGNIFICANT CHANGE UP (ref 0–41)
ANION GAP SERPL CALC-SCNC: 8 MMOL/L — SIGNIFICANT CHANGE UP (ref 7–14)
AST SERPL-CCNC: 38 U/L — SIGNIFICANT CHANGE UP (ref 0–41)
B BURGDOR C6 AB SER-ACNC: NEGATIVE — SIGNIFICANT CHANGE UP
B BURGDOR IGG+IGM SER-ACNC: 0.17 INDEX — SIGNIFICANT CHANGE UP (ref 0.01–0.89)
BILIRUB SERPL-MCNC: 1.3 MG/DL — HIGH (ref 0.2–1.2)
BUN SERPL-MCNC: 12 MG/DL — SIGNIFICANT CHANGE UP (ref 10–20)
CALCIUM SERPL-MCNC: 8.9 MG/DL — SIGNIFICANT CHANGE UP (ref 8.5–10.1)
CHLORIDE SERPL-SCNC: 103 MMOL/L — SIGNIFICANT CHANGE UP (ref 98–110)
CO2 SERPL-SCNC: 26 MMOL/L — SIGNIFICANT CHANGE UP (ref 17–32)
COVID-19 SPIKE DOMAIN AB INTERP: POSITIVE
COVID-19 SPIKE DOMAIN ANTIBODY RESULT: 44 U/ML — HIGH
CREAT SERPL-MCNC: 1 MG/DL — SIGNIFICANT CHANGE UP (ref 0.7–1.5)
GLUCOSE SERPL-MCNC: 104 MG/DL — HIGH (ref 70–99)
HCT VFR BLD CALC: 50.8 % — SIGNIFICANT CHANGE UP (ref 42–52)
HGB BLD-MCNC: 16.7 G/DL — SIGNIFICANT CHANGE UP (ref 14–18)
MCHC RBC-ENTMCNC: 29 PG — SIGNIFICANT CHANGE UP (ref 27–31)
MCHC RBC-ENTMCNC: 32.9 G/DL — SIGNIFICANT CHANGE UP (ref 32–37)
MCV RBC AUTO: 88.2 FL — SIGNIFICANT CHANGE UP (ref 80–94)
NRBC # BLD: 0 /100 WBCS — SIGNIFICANT CHANGE UP (ref 0–0)
PLATELET # BLD AUTO: 127 K/UL — LOW (ref 130–400)
POTASSIUM SERPL-MCNC: 3.9 MMOL/L — SIGNIFICANT CHANGE UP (ref 3.5–5)
POTASSIUM SERPL-SCNC: 3.9 MMOL/L — SIGNIFICANT CHANGE UP (ref 3.5–5)
PROT SERPL-MCNC: 6.6 G/DL — SIGNIFICANT CHANGE UP (ref 6–8)
RBC # BLD: 5.76 M/UL — SIGNIFICANT CHANGE UP (ref 4.7–6.1)
RBC # FLD: 13.9 % — SIGNIFICANT CHANGE UP (ref 11.5–14.5)
SARS-COV-2 IGG+IGM SERPL QL IA: 44 U/ML — HIGH
SARS-COV-2 IGG+IGM SERPL QL IA: POSITIVE
SODIUM SERPL-SCNC: 137 MMOL/L — SIGNIFICANT CHANGE UP (ref 135–146)
T4 AB SER-ACNC: 6.5 UG/DL — SIGNIFICANT CHANGE UP (ref 4.6–12)
TSH SERPL-MCNC: 1.52 UIU/ML — SIGNIFICANT CHANGE UP (ref 0.27–4.2)
WBC # BLD: 11.13 K/UL — HIGH (ref 4.8–10.8)
WBC # FLD AUTO: 11.13 K/UL — HIGH (ref 4.8–10.8)

## 2021-07-07 PROCEDURE — 93010 ELECTROCARDIOGRAM REPORT: CPT

## 2021-07-07 PROCEDURE — 99232 SBSQ HOSP IP/OBS MODERATE 35: CPT

## 2021-07-07 PROCEDURE — 99213 OFFICE O/P EST LOW 20 MIN: CPT

## 2021-07-07 PROCEDURE — 71046 X-RAY EXAM CHEST 2 VIEWS: CPT | Mod: 26

## 2021-07-07 RX ORDER — CEPHALEXIN 500 MG
1 CAPSULE ORAL
Qty: 10 | Refills: 0
Start: 2021-07-07 | End: 2021-07-11

## 2021-07-07 RX ADMIN — PANTOPRAZOLE SODIUM 40 MILLIGRAM(S): 20 TABLET, DELAYED RELEASE ORAL at 05:32

## 2021-07-07 RX ADMIN — Medication 250 MILLIGRAM(S): at 02:31

## 2021-07-07 NOTE — DISCHARGE NOTE PROVIDER - NSDCACTIVITY_GEN_ALL_CORE
Do not drive or operate machinery/No heavy lifting/straining Do not drive or operate machinery/Do not make important decisions/No heavy lifting/straining

## 2021-07-07 NOTE — PROGRESS NOTE ADULT - ASSESSMENT
74 yo M with PMH DL, spinal surgery / sciatica admitt6/d with 2:1 AVB. Found to have right corneal abrasion (prescribed polytrim in ED). Now s/p DC PPM ([facility]) on 7/6/2021. No immediate postop complications noted.     Impression:  2:1 AVB s/p DC PPM ([facility])  Rt corneal abrasion    Plan:  - Interrogation complete, normal functioning device  - Awaiting PA/LAt  - EKG noted  - Spoke with opthalmology, please fu at clinic today at 2 PM, [address] with Dr. De  - Fu on 7/13 at 10 AM with Dr. Oro office for wound check    Dispo: Home today pending CXR    Discharge Instructions:  - No heavy lifting >5 lbs. for 4-6 weeks  - Do not raise your arm above shoulder level for 4-6 weeks.   - Do not shower for 5 days, may resume on Sunday  - No submerging in water for 1 month.  - Leave steri-strips in place, they will fall off on their own.  - No driving for 2 weeks.

## 2021-07-07 NOTE — DISCHARGE NOTE PROVIDER - PROVIDER TOKENS
PROVIDER:[TOKEN:[[id number]],SCHEDULEDAPPT:[07/13/2021],SCHEDULEDAPPTTIME:[10:00 AM],ESTABLISHEDPATIENT:[T]]

## 2021-07-07 NOTE — DISCHARGE NOTE PROVIDER - NSDCMRMEDTOKEN_GEN_ALL_CORE_FT
atorvastatin 20 mg oral tablet: 1 tab(s) orally once a day  Calcium 600+D Plus Minerals oral tablet, chewable: 1 tab(s) orally once a day  celecoxib 200 mg oral capsule: 1 cap(s) orally once a day  docusate sodium 100 mg oral tablet: 2 tab(s) orally once a day (at bedtime)  Flomax 0.4 mg oral capsule: 1 cap(s) orally once a day (at bedtime)   Keflex 500 mg oral capsule: 1 cap(s) orally 2 times a day MDD:2  Lyrica 75 mg oral capsule: 1 cap(s) orally 3 times a day  oxybutynin 15 mg/24 hr oral tablet, extended release: 1 tab(s) orally once a day  Tylenol with Codeine #3 oral tablet: 1 tab(s) orally 4 times a day MDD:4 pills

## 2021-07-07 NOTE — DISCHARGE NOTE PROVIDER - NSFOLLOWUPCLINICS_GEN_ALL_ED_FT
[facility]  Ophthalmolgy  [address], [address]  [place] [address]  Phone: [phone]  Fax:   Scheduled Appointment: 7/7/2021 2:00 PM

## 2021-07-07 NOTE — DISCHARGE NOTE PROVIDER - NSDCFUADDINST_GEN_ALL_CORE_FT
- No heavy lifting >5 lbs. for 4-6 weeks  - Do not raise your arm above shoulder level for 4-6 weeks.   - Do not shower for 5 days, may resume on Sunday  - No submerging in water for 1 month.  - Leave steri-strips in place, they will fall off on their own.  - No driving for 2 weeks.  - Fu on 7/13 at 10 AM with Dr. Oro office for wound check - No heavy lifting >5 lbs. for 4-6 weeks.  - Do not raise your arm above shoulder level for 4-6 weeks.   - Do not shower for 5 days, may resume on Sunday.  - No submerging in water, swimming pools, or hot tubs for 1 month.  - Leave steri-strips in place, they will fall off on their own.  - No driving for 2 weeks.  - Follow up on 7/7 at 2 PM with ophthalmology for eye check.  - Follow up on 7/13 at 10 AM with Dr. Oro office for wound check.

## 2021-07-07 NOTE — DISCHARGE NOTE PROVIDER - CARE PROVIDER_API CALL
Emelyn Olivia (MD)  Cardiac Electrophysiology; Cardiology; Internal Medicine  [address] [place] [address]  Phone: [phone]  Fax: [phone]  Established Patient  Scheduled Appointment: 07/13/2021 10:00 AM

## 2021-07-07 NOTE — DISCHARGE NOTE PROVIDER - NSDCCPCAREPLAN_GEN_ALL_CORE_FT
PRINCIPAL DISCHARGE DIAGNOSIS  Diagnosis: AV block, 2nd degree  Assessment and Plan of Treatment:       SECONDARY DISCHARGE DIAGNOSES  Diagnosis: Corneal abrasion  Assessment and Plan of Treatment:      PRINCIPAL DISCHARGE DIAGNOSIS  Diagnosis: AV block, 2nd degree  Assessment and Plan of Treatment: Your EKG showed that you had 2nd degree 2:1 AV block, so you had a pacemaker implanted. Follow up with Dr. Olivia on 7/13 10 AM for your wound check.      SECONDARY DISCHARGE DIAGNOSES  Diagnosis: Corneal abrasion  Assessment and Plan of Treatment: You have a follow-up appointment with the ophthalmology clinic at [address] [place] [address], today 7/7 at 2 PM.

## 2021-07-07 NOTE — PROGRESS NOTE ADULT - SUBJECTIVE AND OBJECTIVE BOX
INTERVAL HPI/OVERNIGHT EVENTS:  Pt is POD #1 DC PPM. No acute events overnight. Pt V- paced. No tele events noted. Cont to c/o right eye pain with minimla relief with polytrim drops.   Patient denies fever, chills, dizziness, syncope, chest pain, palpitations, SOB, cough, abd pain, n/v/d/c, dysuria, hematuria or unusual rash.     MEDICATIONS  (STANDING):  atorvastatin 20 milliGRAM(s) Oral at bedtime  pantoprazole    Tablet 40 milliGRAM(s) Oral before breakfast  tamsulosin 0.4 milliGRAM(s) Oral at bedtime  vancomycin  IVPB 1000 milliGRAM(s) IV Intermittent every 12 hours    MEDICATIONS  (PRN):      Allergies    No Known Allergies    Intolerances        REVIEW OF SYSTEMS    [x] A ten-point review of systems was otherwise negative except as noted.  [ ] Due to altered mental status/intubation, subjective information were not able to be obtained from the patient. History was obtained, to the extent possible, from review of the chart and collateral sources of information.      Vital Signs Last 24 Hrs  T(C): 36.3 (07 Jul 2021 05:18), Max: 36.8 (06 Jul 2021 20:39)  T(F): 97.3 (07 Jul 2021 05:18), Max: 98.3 (06 Jul 2021 20:39)  HR: 91 (07 Jul 2021 05:18) (32 - 92)  BP: 129/68 (07 Jul 2021 05:18) (110/55 - 150/78)  BP(mean): --  RR: 18 (07 Jul 2021 05:18) (18 - 18)  SpO2: 96% (07 Jul 2021 05:33) (96% - 100%)    07-06-21 @ 07:01  -  07-07-21 @ 07:00  --------------------------------------------------------  IN: 0 mL / OUT: 500 mL / NET: -500 mL    07-07-21 @ 07:01  -  07-07-21 @ 10:05  --------------------------------------------------------  IN: 330 mL / OUT: 0 mL / NET: 330 mL    Physical Exam  GENERAL: In no apparent distress, well nourished, and hydrated.  HEENT: Rt eye redness  HEART: Regular rate and rhythm; No murmurs, rubs, or gallops.  PULMONARY: Clear to auscultation and perfusion.  No rales, wheezing, or rhonchi bilaterally.  CHEST: Left chest wall surgical incision c/d/i, no hematoma/drainage noted  ABDOMEN: Soft, Nontender, Nondistended; Bowel sounds present  EXTREMITIES:  2+ Peripheral Pulses, No clubbing, cyanosis, or edema  NEUROLOGICAL: AO x3, TRINIDAD, speech clear.     LABS:                        15.6   11.38 )-----------( 127      ( 06 Jul 2021 08:13 )             47.6     07-06    140  |  105  |  14  ----------------------------<  91  4.2   |  27  |  1.1    Ca    8.7      06 Jul 2021 08:13  Mg     2.1     07-06    TPro  6.2  /  Alb  3.8  /  TBili  1.2  /  DBili  x   /  AST  28  /  ALT  33  /  AlkPhos  101  07-06 07-06-21 @ 07:01 - 07-07-21 @ 07:00  --------------------------------------------------------  IN: 0 mL / OUT: 500 mL / NET: -500 mL    07-07-21 @ 07:01 - 07-07-21 @ 10:05  --------------------------------------------------------  IN: 330 mL / OUT: 0 mL / NET: 330 mL      07-06-21 @ 07:01 - 07-07-21 @ 07:00  --------------------------------------------------------  IN: 0 mL / OUT: 500 mL / NET: -500 mL    07-07-21 @ 07:01  -  07-07-21 @ 10:05  --------------------------------------------------------  IN: 330 mL / OUT: 0 mL / NET: 330 mL    RADIOLOGY & ADDITIONAL TESTS:  < from: Xray Chest 1 View- PORTABLE-Urgent (Xray Chest 1 View- PORTABLE-Urgent .) (07.06.21 @ 03:10) >  INTERPRETATION:  Clinical History / Reason for exam: Shortness of breath    Comparison : Chest radiograph October 5, 2018.    Technique/Positioning: Frontal portable low lung volumes.    Findings:    Support devices: Telemetry leads overlie the thorax    Cardiac/mediastinum/hilum: Unremarkable.    Lung parenchyma/Pleura: Within normal limits.    Skeleton/soft tissues: Unchanged    Impression:    No radiographicevidence of acute cardiopulmonary disease.    Unchanged.    < end of copied text >    < from: 12 Lead ECG (07.06.21 @ 02:38) >  Ventricular Rate 32 BPM    Atrial Rate 63 BPM    P-R Interval 194 ms    QRS Duration 74 ms    Q-T Interval 546 ms    QTC Calculation(Bazett) 398 ms    P Axis 75 degrees    R Axis -1 degrees    T Axis 51 degrees    Diagnosis Line Sinus rhythm with 2nd degree A-V block with 2:1 A-V conduction  Nonspecific ST and T wave abnormality  Abnormal ECG    < end of copied text >  
Electrophysiology Brief Post-Op Note    I have personally seen and examined the patient.  I agree with the history and physical which I have reviewed and noted any changes below.  07-06-21 @ 1300    PRE-OP DIAGNOSIS: 2:1 AV block    POST-OP DIAGNOSIS: 2:1 AV block    PROCEDURE: DC PPM Implant    Physician: Rosalba Rosas MD  Assistant: None    ESTIMATED BLOOD LOSS:     5  mL    ANESTHESIA TYPE:  [  ] General Anesthesia  [ x ] Sedation  [ x ] Local/Regional    CONDITION  [  ] Critical  [  ] Serious  [  ] Fair  [ x ] Good      SPECIMENS REMOVED (IF APPLICABLE): None    IMPLANTS (IF APPLICABLE): DC PPM ([facility])    FINDINGS: 2:1 AV block    COMPLICATIONS: None    PLAN OF CARE   - Vancomycin 1g IV q12 h  - Chest X-ray PA now and PA/Lat tomorrow am  - Device interrogation tomorrow in am  - NO HEPARIN PRODUCTS OR LOVENOX   - No anticoagulation for 48 hours unless recommended by EP attending  - Keflex 500 mg PO BID x 5 days upon discharge.  - No shower x 3 days.  - No lifting left arm above shoulder level x 1 month. No heavy lifting with left arm x 1 mo.  - No swimming or hot tubs x 1 month.  - Follow up with Dr. Olivia in the office in 3-4 weeks at [address] [place] [address], [address], [place] (Phone [phone])    Rosalba Rosas MD   Electrophysiology Attending

## 2021-07-07 NOTE — DISCHARGE NOTE PROVIDER - NSDCCPTREATMENT_GEN_ALL_CORE_FT
PRINCIPAL PROCEDURE  Procedure: Insertion of permanent dual lead pacemaker  Findings and Treatment: [facility] on 7/6/2021 by Dr. Rosas

## 2021-07-07 NOTE — DISCHARGE NOTE NURSING/CASE MANAGEMENT/SOCIAL WORK - PATIENT PORTAL LINK FT
You can access the FollowMyHealth Patient Portal offered by [facility] by registering at the following website: [email or web address]. By joining [facility]’s FollowMyHealth portal, you will also be able to view your health information using other applications (apps) compatible with our system.

## 2021-07-07 NOTE — DISCHARGE NOTE PROVIDER - HOSPITAL COURSE
76 yo M with PMH DL, spinal surgery / sciatica admitt6/d with 2:1 AVB. Found to have right corneal abrasion (prescribed polytrim in ED). Now s/p DC PPM ([facility]) on 7/6/2021. No immediate postop complications noted. 76 yo man with PMH of HLD, BPH, b/l cataract surgery, and chronic back pain presented to the ED with R eye pain + blurry vision x 1 day, and generalized weakness and dizziness x couple of weeks. Wakes up in PM with SOB. Eye exam showed corneal abrasion. Patient was admitted to CCU for monitoring and was evaluated by EP. Echo showed normal EF, EKG showed 2:1 AV block. Patient received PPM then got downgraded to 3C. Patient was started on vancomycin and Keflex x 5 days. Right eye pain persisted, so patient was discharged with a f/u with Dr. Olivia from electrophysiology. 74 yo man with PMH of HLD, BPH, b/l cataract surgery, and chronic back pain presented to the ED with R eye pain + blurry vision x 1 day, and generalized weakness and dizziness x couple of weeks. Eye exam showed corneal abrasion. Patient was admitted to CCU for monitoring and was evaluated by EP. Echo showed normal EF, EKG showed 2:1 AV block. Patient received PPM then got downgraded to 3C. Patient was administered 1 time dose of vancomycin and started on Keflex x 5 days. Right eye pain persisted, so patient was discharged with a f/u with ophtho appointment today afternoon  f/u with Dr. Olivia from electrophysiology next week

## 2021-07-07 NOTE — CHART NOTE - NSCHARTNOTEFT_GEN_A_CORE
PACU ANESTHESIA ADMISSION NOTE      Procedure:   Post op diagnosis:      ____  Intubated  TV:______       Rate: ______      FiO2: ______    __x__  Patent Airway    __x__  Full return of protective reflexes    __x__  Full recovery from anesthesia / back to baseline status    Vitals:  T(C): 36.7 (07-06-21 @ 12:38), Max: 37 (07-05-21 @ 19:00)  HR: 32 (07-06-21 @ 12:38) (32 - 47)  BP: 110/55 (07-06-21 @ 12:38) (105/53 - 142/60)  RR: 18 (07-06-21 @ 12:38) (18 - 18)  SpO2: 100% (07-06-21 @ 12:38) (95% - 100%)    Mental Status:  __x__ Awake   ___x__ Alert   _____ Drowsy   _____ Sedated, Agitated and confused    Nausea/Vomiting:  __x__ NO  ______Yes,   See Post - Op Orders          Pain Scale (0-10):  _____    Treatment: ____ None    __x__ See Post - Op/PCA Orders    Post - Operative Fluids:   ____ Oral   __x__ See Post - Op Orders    Plan: Discharge:   ____Home       __X___Floor     _____Critical Care    _____  Other:_________________    Comments: Patient had smooth intraoperative event, no anesthesia complication.  PACU Vital signs: HR:    83        BP:    166    /   82       RR:   20          O2 Sat:  99    %     Temp 97f
74 YO M with PMHx HLD, BPH, b/l cataract surgery, chronic back pain presented to the ED for R eye pain/blurry vision that started yesterday and generalized weakness and dizziness for the past couple of weeks  Collateral history obtained from the son at bedside. Patient denied any history of trauma to eye. No recent falls. Wears reading glasses only but reports his R eye vision has always been worse than Left.   He also reports having generalized weakness and dizziness for the last couple of weeks. Uses 1 pillow to sleep at night but wakes up in the middle of the night short of breath  He was told his heart rate was "slow" in the past and he went to a cardiologist, but never followed up.   Vaccinated for COVID ([employer]) in May 2021.  In the ED VS were sig for a HR of 36. EKG showed 2:1 AV block. Approved for CCU  Bedside eye exam consistent with corneal abrasion       Patient was having the AV block with symptoms on presentation, he was seen and evaluated by the EP team who decided to install a pacemaker for the patient. Patient received his PPM yesterday 06/06/2021 with no complications.     Follow up the chest xray and the device interrogation today.     ##Patient initial presentation was mainly due to the eye pain. Please follow up with ophthalmology as bedside evaluation was consistent with corneal abrasion     Vital signs are stable, patient is denying any lightheadedness or dizziness

## 2021-07-07 NOTE — DISCHARGE NOTE NURSING/CASE MANAGEMENT/SOCIAL WORK - NSDCVIVACCINE_GEN_ALL_CORE_FT
Patient last saw you in the office on 10/2019.   Has upcoming visit 4/2020  Last refill tramadol was 7/12/19 #40.   Please advise.   No Vaccines Administered.

## 2021-07-08 DIAGNOSIS — Z02.9 ENCOUNTER FOR ADMINISTRATIVE EXAMINATIONS, UNSPECIFIED: ICD-10-CM

## 2021-07-10 ENCOUNTER — TRANSCRIPTION ENCOUNTER (OUTPATIENT)
Age: 76
End: 2021-07-10

## 2021-07-13 ENCOUNTER — APPOINTMENT (OUTPATIENT)
Dept: CARDIOLOGY | Facility: CLINIC | Age: 76
End: 2021-07-13
Payer: MEDICAID

## 2021-07-13 VITALS
HEART RATE: 79 BPM | BODY MASS INDEX: 30.53 KG/M2 | HEIGHT: 66 IN | DIASTOLIC BLOOD PRESSURE: 78 MMHG | SYSTOLIC BLOOD PRESSURE: 146 MMHG | WEIGHT: 190 LBS | TEMPERATURE: 97.8 F

## 2021-07-13 PROCEDURE — 99024 POSTOP FOLLOW-UP VISIT: CPT

## 2021-07-13 PROCEDURE — 93281 PM DEVICE PROGR EVAL MULTI: CPT

## 2021-07-13 PROCEDURE — 93000 ELECTROCARDIOGRAM COMPLETE: CPT | Mod: 59

## 2021-07-13 RX ORDER — UNDERPADS 23" X 36"
EACH MISCELLANEOUS
Qty: 60 | Refills: 5 | Status: COMPLETED | COMMUNITY
Start: 2019-03-14 | End: 2021-07-13

## 2021-07-13 RX ORDER — DOCUSATE SODIUM 100 MG/1
100 CAPSULE ORAL
Qty: 60 | Refills: 5 | Status: COMPLETED | COMMUNITY
Start: 2020-07-20 | End: 2021-07-13

## 2021-07-13 RX ORDER — SERTRALINE HYDROCHLORIDE 100 MG/1
100 TABLET, FILM COATED ORAL DAILY
Qty: 90 | Refills: 1 | Status: COMPLETED | COMMUNITY
Start: 2018-03-20 | End: 2021-07-13

## 2021-07-13 RX ORDER — HYDROCODONE BITARTRATE AND ACETAMINOPHEN 5; 325 MG/1; MG/1
5-325 TABLET ORAL
Qty: 120 | Refills: 0 | Status: COMPLETED | COMMUNITY
Start: 2020-04-09 | End: 2021-07-13

## 2021-07-13 RX ORDER — DOCUSATE SODIUM 100 MG/1
100 CAPSULE ORAL 3 TIMES DAILY
Qty: 90 | Refills: 6 | Status: COMPLETED | COMMUNITY
Start: 2019-07-24 | End: 2021-07-13

## 2021-07-13 RX ORDER — CEFPODOXIME PROXETIL 200 MG/1
200 TABLET, FILM COATED ORAL
Qty: 6 | Refills: 0 | Status: COMPLETED | COMMUNITY
Start: 2020-08-28 | End: 2021-07-13

## 2021-07-13 RX ORDER — DOCUSATE SODIUM 100 MG/1
100 CAPSULE ORAL
Qty: 60 | Refills: 5 | Status: COMPLETED | COMMUNITY
Start: 2021-02-25 | End: 2021-07-13

## 2021-07-13 RX ORDER — DICLOFENAC POTASSIUM 50 MG/1
50 TABLET, COATED ORAL
Refills: 0 | Status: COMPLETED | COMMUNITY
End: 2021-07-13

## 2021-07-13 RX ORDER — SERTRALINE 25 MG/1
25 TABLET, FILM COATED ORAL DAILY
Qty: 30 | Refills: 5 | Status: COMPLETED | COMMUNITY
Start: 2017-02-17 | End: 2021-07-13

## 2021-07-13 RX ORDER — DICLOFENAC SODIUM 10 MG/G
1 GEL TOPICAL
Qty: 1 | Refills: 5 | Status: COMPLETED | COMMUNITY
Start: 2017-09-25 | End: 2021-07-13

## 2021-07-13 NOTE — ASSESSMENT
[FreeTextEntry1] : s/p DC PPM for High degree AVB \par \par \par Left infraclavicular PPM site with steri strips, no s/s infection, no hematoma. \par \par PPM interrogation: Normal function. All parameters stable. AP <1%,  >99%. No events or arrhythmia. \par Baseline rhythm: 2 AVB, with HR <40 bpm. \par \par Patient is enrolled in remote monitoring services, however no remote transmission received since the PPM implant. \par I asked pt's son to assist with a manual remote transmission to reestablish remote connection. \par \par He reports c/w meds   \par \par \par \par Plan \par -Monitor PPM site for s/s infection, patient/son know to call the office for any questions/concerns.  \par -Continue remote monitoring.\par -Continue current medications. \par -RTC in 3 months

## 2021-07-13 NOTE — PHYSICAL EXAM
[General Appearance - Well Developed] : well developed [General Appearance - Well Nourished] : well nourished [Heart Rate And Rhythm] : heart rate and rhythm were normal [Heart Sounds] : normal S1 and S2 [] : no respiratory distress [Respiration, Rhythm And Depth] : normal respiratory rhythm and effort [Auscultation Breath Sounds / Voice Sounds] : lungs were clear to auscultation bilaterally [Left Infraclavicular] : left infraclavicular area [Clean] : clean [Dry] : dry [Well-Healed] : well-healed [Bowel Sounds] : normal bowel sounds [Abdomen Soft] : soft [Nail Clubbing] : no clubbing of the fingernails [Cyanosis, Localized] : no localized cyanosis

## 2021-07-13 NOTE — HISTORY OF PRESENT ILLNESS
[de-identified] : 76 y/o male who was admitted to [facility] for symptomatic bradycardia/ 2:1 AVB underwent a DC PPM on 7/6/2021 \par Returns for follow up/wound check \par \par Accompanied by son, who assisted with translation. Patient speaks [place]. \par \par Patient denies chest pain,shortness of breath, dizziness, near syncope or syncope. \par Denies fever or chills. \par \par ECG (7/13/2021): NSR at 79 BPM, V paced .  \par ECHO (7/6/2021): Normal LVF, trace MR, Mild TR/AR   \par \par  \par

## 2021-07-14 ENCOUNTER — APPOINTMENT (OUTPATIENT)
Dept: OPHTHALMOLOGY | Facility: CLINIC | Age: 76
End: 2021-07-14

## 2021-07-14 ENCOUNTER — OUTPATIENT (OUTPATIENT)
Dept: OUTPATIENT SERVICES | Facility: HOSPITAL | Age: 76
LOS: 1 days | Discharge: HOME | End: 2021-07-14
Payer: MEDICAID

## 2021-07-14 DIAGNOSIS — Z87.828 PERSONAL HISTORY OF OTHER (HEALED) PHYSICAL INJURY AND TRAUMA: Chronic | ICD-10-CM

## 2021-07-14 PROCEDURE — 99213 OFFICE O/P EST LOW 20 MIN: CPT

## 2021-07-15 DIAGNOSIS — I44.7 LEFT BUNDLE-BRANCH BLOCK, UNSPECIFIED: ICD-10-CM
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
## 2021-07-15 DIAGNOSIS — M54.5 LOW BACK PAIN: ICD-10-CM

## 2021-07-15 DIAGNOSIS — I44.1 ATRIOVENTRICULAR BLOCK, SECOND DEGREE: ICD-10-CM

## 2021-07-15 DIAGNOSIS — N18.30 CHRONIC KIDNEY DISEASE, STAGE 3 UNSPECIFIED: ICD-10-CM

## 2021-07-15 DIAGNOSIS — I12.9 HYPERTENSIVE CHRONIC KIDNEY DISEASE WITH STAGE 1 THROUGH STAGE 4 CHRONIC KIDNEY DISEASE, OR UNSPECIFIED CHRONIC KIDNEY DISEASE: ICD-10-CM

## 2021-07-15 DIAGNOSIS — R53.1 WEAKNESS: ICD-10-CM

## 2021-07-15 DIAGNOSIS — M54.30 SCIATICA, UNSPECIFIED SIDE: ICD-10-CM

## 2021-07-15 DIAGNOSIS — N40.0 BENIGN PROSTATIC HYPERPLASIA WITHOUT LOWER URINARY TRACT SYMPTOMS: ICD-10-CM

## 2021-07-15 DIAGNOSIS — S05.00XA INJURY OF CONJUNCTIVA AND CORNEAL ABRASION WITHOUT FOREIGN BODY, UNSPECIFIED EYE, INITIAL ENCOUNTER: ICD-10-CM

## 2021-07-15 DIAGNOSIS — F32.9 MAJOR DEPRESSIVE DISORDER, SINGLE EPISODE, UNSPECIFIED: ICD-10-CM
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
## 2021-07-15 DIAGNOSIS — E78.5 HYPERLIPIDEMIA, UNSPECIFIED: ICD-10-CM

## 2021-07-15 DIAGNOSIS — H53.8 OTHER VISUAL DISTURBANCES: ICD-10-CM

## 2021-07-21 ENCOUNTER — APPOINTMENT (OUTPATIENT)
Dept: OPHTHALMOLOGY | Facility: CLINIC | Age: 76
End: 2021-07-21

## 2021-07-21 ENCOUNTER — OUTPATIENT (OUTPATIENT)
Dept: OUTPATIENT SERVICES | Facility: HOSPITAL | Age: 76
LOS: 1 days | Discharge: HOME | End: 2021-07-21
Payer: MEDICAID

## 2021-07-21 DIAGNOSIS — Z87.828 PERSONAL HISTORY OF OTHER (HEALED) PHYSICAL INJURY AND TRAUMA: Chronic | ICD-10-CM

## 2021-07-21 PROCEDURE — 99213 OFFICE O/P EST LOW 20 MIN: CPT

## 2021-07-27 DIAGNOSIS — H02.20C: ICD-10-CM

## 2021-07-27 DIAGNOSIS — H16.009 UNSPECIFIED CORNEAL ULCER, UNSPECIFIED EYE: ICD-10-CM

## 2021-07-27 DIAGNOSIS — H16.149 PUNCTATE KERATITIS, UNSPECIFIED EYE: ICD-10-CM

## 2021-07-29 ENCOUNTER — APPOINTMENT (OUTPATIENT)
Dept: GERIATRICS | Facility: CLINIC | Age: 76
End: 2021-07-29
Payer: MEDICAID

## 2021-07-29 ENCOUNTER — NON-APPOINTMENT (OUTPATIENT)
Age: 76
End: 2021-07-29

## 2021-07-29 ENCOUNTER — OUTPATIENT (OUTPATIENT)
Dept: OUTPATIENT SERVICES | Facility: HOSPITAL | Age: 76
LOS: 1 days | Discharge: HOME | End: 2021-07-29

## 2021-07-29 VITALS
DIASTOLIC BLOOD PRESSURE: 76 MMHG | WEIGHT: 184 LBS | SYSTOLIC BLOOD PRESSURE: 118 MMHG | TEMPERATURE: 95.8 F | HEIGHT: 66 IN | OXYGEN SATURATION: 97 % | BODY MASS INDEX: 29.57 KG/M2 | HEART RATE: 79 BPM

## 2021-07-29 DIAGNOSIS — Z87.828 PERSONAL HISTORY OF OTHER (HEALED) PHYSICAL INJURY AND TRAUMA: Chronic | ICD-10-CM

## 2021-07-29 DIAGNOSIS — G47.00 INSOMNIA, UNSPECIFIED: ICD-10-CM

## 2021-07-29 DIAGNOSIS — I10 ESSENTIAL (PRIMARY) HYPERTENSION: ICD-10-CM

## 2021-07-29 DIAGNOSIS — M54.9 DORSALGIA, UNSPECIFIED: ICD-10-CM

## 2021-07-29 PROCEDURE — 99214 OFFICE O/P EST MOD 30 MIN: CPT

## 2021-07-29 NOTE — END OF VISIT
[] : Resident [FreeTextEntry3] : SEEN WITH JULIA TEAM\par bizarre story of having ocular pain, going to ED and being found to have profound bradycardia which was incidental; had PPM placed; discussed  and reviewed hospital curse with son; patient also goes to pain management service in [place] - note trial of PT last year was of no help; is c/o insomnia; will try course of trazadone; RTC 3 months

## 2021-07-29 NOTE — PLAN
[FreeTextEntry1] : 75 year old man with PMH Urinary Incontinence, Osteoarthritis, Depression, and Dyslipidemia, Heart block s/p pacemaker presents for follow up\par \par #Heart block s/p pacemaker\par - F/u with cardiology \par \par # Osteoarthritis/ back pain \par - Continue Diclofenac gel for pain relief\par  pregabalin 75TID \par -lidocaine-prilocaine cream\par -follow up with pain doctor in [facility] adjustment of medications\par \par #osteoporosis \par -on vit d and calcium\par \par # urge Incontinence\par - Patient following with Urology\par - s/p Laser Lithotripsy and stent removal\par - Continue Ditropan XL and Diapers\par -c/w oxybutynin and tamsulosin \par - Continue routine follow up as per Urology. ( to see  for urge incontinence \par \par # Depression\par - Continue Sertraline\par \par # Dyslipidemia\par - Continue Atorvastatin \par \par # HCM\par - Colonoscopy (2016): 4 Colonic Polyps were visualized with tubular adenoma noted on histopathology., need to follow next year for repeat colonscopy\par - repeat blood work at next visit\par Uptodate with COVID vaccine \par . \par

## 2021-07-29 NOTE — HISTORY OF PRESENT ILLNESS
[FreeTextEntry1] : 75 year old man with PMH Urinary Incontinence, Osteoarthritis, Depression, and Dyslipidemia, Heart block s/p pacemaker presents for follow up [de-identified] : 75 year old man with PMH Urinary Incontinence, Osteoarthritis, Depression, and Dyslipidemia, Heart block s/p pacemaker presents for follow up. \par complaining insomnia, he still have back pain, follow up with pain in [place].

## 2021-07-29 NOTE — PHYSICAL EXAM
[No Acute Distress] : no acute distress [Normal Sclera/Conjunctiva] : normal sclera/conjunctiva [No JVD] : no jugular venous distention [No Respiratory Distress] : no respiratory distress  [Normal Rate] : normal rate  [Regular Rhythm] : with a regular rhythm [Normal S1, S2] : normal S1 and S2 [No Edema] : there was no peripheral edema [Soft] : abdomen soft [Non Tender] : non-tender [Non-distended] : non-distended [No HSM] : no HSM [Normal Bowel Sounds] : normal bowel sounds [No CVA Tenderness] : no CVA  tenderness [No Rash] : no rash [No Skin Lesions] : no skin lesions [Coordination Grossly Intact] : coordination grossly intact [No Focal Deficits] : no focal deficits

## 2021-08-02 DIAGNOSIS — E78.5 HYPERLIPIDEMIA, UNSPECIFIED: ICD-10-CM

## 2021-08-02 DIAGNOSIS — I10 ESSENTIAL (PRIMARY) HYPERTENSION: ICD-10-CM

## 2021-08-02 DIAGNOSIS — Z00.00 ENCOUNTER FOR GENERAL ADULT MEDICAL EXAMINATION WITHOUT ABNORMAL FINDINGS: ICD-10-CM

## 2021-08-02 DIAGNOSIS — M54.9 DORSALGIA, UNSPECIFIED: ICD-10-CM

## 2021-08-02 DIAGNOSIS — F32.9 MAJOR DEPRESSIVE DISORDER, SINGLE EPISODE, UNSPECIFIED: ICD-10-CM

## 2021-08-02 DIAGNOSIS — M19.90 UNSPECIFIED OSTEOARTHRITIS, UNSPECIFIED SITE: ICD-10-CM

## 2021-10-13 ENCOUNTER — APPOINTMENT (OUTPATIENT)
Dept: CARDIOLOGY | Facility: CLINIC | Age: 76
End: 2021-10-13
Payer: MEDICAID

## 2021-10-13 VITALS
SYSTOLIC BLOOD PRESSURE: 114 MMHG | BODY MASS INDEX: 28.93 KG/M2 | DIASTOLIC BLOOD PRESSURE: 79 MMHG | HEART RATE: 81 BPM | WEIGHT: 180 LBS | OXYGEN SATURATION: 99 % | TEMPERATURE: 97.8 F | HEIGHT: 66 IN | RESPIRATION RATE: 16 BRPM

## 2021-10-13 PROCEDURE — 99212 OFFICE O/P EST SF 10 MIN: CPT | Mod: 25

## 2021-10-13 PROCEDURE — 93280 PM DEVICE PROGR EVAL DUAL: CPT

## 2021-10-13 PROCEDURE — 93000 ELECTROCARDIOGRAM COMPLETE: CPT | Mod: 59

## 2021-10-13 NOTE — ASSESSMENT
[FreeTextEntry1] : s/p DC PPM for High degree AVB on 7/6/2021\par \par \par \par PPM interrogation: Normal function. All parameters stable. AP <1%,  >99%. No significant arrhythmias. \par Baseline rhythm: 2 AVB, with HR <40 bpm. \par Left infraclavicular PPM pocket site: WNL \par \par Patient is enrolled in remote monitoring services and transmitting appropriately. \par last remote transmission received in 7/2021 \par He reports c/w meds  \par \par \par \par \par Plan \par -Continue remote monitoring as scheduled q 3 months \par -Continue current medications. \par -Yearly ECHO \par -RTC in 9  months\par -Follow up with PCP as scheduled

## 2021-10-13 NOTE — HISTORY OF PRESENT ILLNESS
[de-identified] : 77 y/o male who was admitted to [facility] for symptomatic bradycardia/ 2:1 AVB underwent a DC PPM on 7/6/2021 \par Returns for follow up/ device interrogation. \par \par Accompanied by son, who has assisted with translation. Patient speaks Tajik. \par \par Patient denies chest pain,shortness of breath, dizziness, near syncope or syncope. \par Denies fever or chills. Reports  AGUERO improved after PPM placement. \par \par Cardiac tests: \par ECG( 10/13/2021); SR at 79 bpm, V paced \par ECG (7/13/2021): NSR at 79 BPM, V paced .  \par ECHO (7/6/2021): Normal LVF, trace MR, Mild TR/AR   \par \par  \par

## 2021-10-13 NOTE — PROCEDURE
[Complete Heart Block] : complete heart block [Pacemaker] : pacemaker [DDD] : DDD [Voltage: ___ volts] : Voltage was [unfilled] volts [Longevity: ___ months] : The estimated remaining battery life is [unfilled] months [Threshold Testing Performed] : Threshold testing was performed [Atrial] : Atrial [Ventricular] : Ventricular [Lead Imp:  ___ohms] : lead impedance was [unfilled] ohms [Sensing Amplitude ___mv] : sensing amplitude was [unfilled] mv [___V @] : [unfilled] V [___ ms] : [unfilled] ms [Programmed for Longevity] : output reprogrammed for improved battery longevity [Asense-Vpace ___ %] : Asense-Vpace [unfilled]% [Apace-Vpace ___ %] : Apace-Vpace [unfilled]% [See Scanned Paceart Report] : See scanned paceart report [See Device Printout] : See device printout [Outputs/Safety Margin] : output changed to allow for adequate safety margin [de-identified] : 82 BPM [de-identified] : RERE [de-identified] : Assurity MRI 0529 [de-identified] : [id number] [de-identified] : 7/6/2021 [de-identified] :  [de-identified] : 3 high rate atrial episodes suggestive of AT :  7/25/2021, 10/6/2021, 10/12/2021, longest 18 secs on 10/6/2021

## 2021-10-14 ENCOUNTER — TRANSCRIPTION ENCOUNTER (OUTPATIENT)
Age: 76
End: 2021-10-14

## 2021-11-05 ENCOUNTER — APPOINTMENT (OUTPATIENT)
Dept: INTERNAL MEDICINE | Facility: CLINIC | Age: 76
End: 2021-11-05

## 2021-11-23 NOTE — ASU PREOP CHECKLIST - LATEX ALLERGY
no
PRE-OP DIAGNOSIS:  Burn scar contracture of multiple sites 23-Nov-2021 07:25:23  Garry Choudhary

## 2022-01-19 ENCOUNTER — APPOINTMENT (OUTPATIENT)
Dept: OPHTHALMOLOGY | Facility: CLINIC | Age: 77
End: 2022-01-19

## 2022-02-14 ENCOUNTER — NON-APPOINTMENT (OUTPATIENT)
Age: 77
End: 2022-02-14

## 2022-02-14 ENCOUNTER — APPOINTMENT (OUTPATIENT)
Dept: CARDIOLOGY | Facility: CLINIC | Age: 77
End: 2022-02-14
Payer: MEDICAID

## 2022-02-14 PROCEDURE — 93296 REM INTERROG EVL PM/IDS: CPT

## 2022-02-14 PROCEDURE — 93294 REM INTERROG EVL PM/LDLS PM: CPT

## 2022-02-17 ENCOUNTER — APPOINTMENT (OUTPATIENT)
Dept: CARDIOLOGY | Facility: CLINIC | Age: 77
End: 2022-02-17
Payer: MEDICAID

## 2022-02-17 ENCOUNTER — INPATIENT (INPATIENT)
Facility: HOSPITAL | Age: 77
LOS: 1 days | Discharge: HOME | End: 2022-02-19
Attending: STUDENT IN AN ORGANIZED HEALTH CARE EDUCATION/TRAINING PROGRAM | Admitting: STUDENT IN AN ORGANIZED HEALTH CARE EDUCATION/TRAINING PROGRAM
Payer: SELF-PAY

## 2022-02-17 VITALS
TEMPERATURE: 99 F | SYSTOLIC BLOOD PRESSURE: 118 MMHG | HEART RATE: 80 BPM | DIASTOLIC BLOOD PRESSURE: 76 MMHG | RESPIRATION RATE: 18 BRPM | OXYGEN SATURATION: 98 %

## 2022-02-17 VITALS
TEMPERATURE: 98 F | WEIGHT: 176 LBS | BODY MASS INDEX: 30.05 KG/M2 | HEART RATE: 89 BPM | HEIGHT: 64 IN | SYSTOLIC BLOOD PRESSURE: 130 MMHG | DIASTOLIC BLOOD PRESSURE: 83 MMHG

## 2022-02-17 DIAGNOSIS — Z87.898 PERSONAL HISTORY OF OTHER SPECIFIED CONDITIONS: ICD-10-CM

## 2022-02-17 DIAGNOSIS — R07.9 CHEST PAIN, UNSPECIFIED: ICD-10-CM

## 2022-02-17 DIAGNOSIS — I44.7 LEFT BUNDLE-BRANCH BLOCK, UNSPECIFIED: ICD-10-CM

## 2022-02-17 DIAGNOSIS — R42 DIZZINESS AND GIDDINESS: ICD-10-CM

## 2022-02-17 DIAGNOSIS — Z87.828 PERSONAL HISTORY OF OTHER (HEALED) PHYSICAL INJURY AND TRAUMA: Chronic | ICD-10-CM

## 2022-02-17 LAB
ALBUMIN SERPL ELPH-MCNC: 4 G/DL — SIGNIFICANT CHANGE UP (ref 3.5–5.2)
ALP SERPL-CCNC: 107 U/L — SIGNIFICANT CHANGE UP (ref 30–115)
ALT FLD-CCNC: 23 U/L — SIGNIFICANT CHANGE UP (ref 0–41)
ANION GAP SERPL CALC-SCNC: 10 MMOL/L — SIGNIFICANT CHANGE UP (ref 7–14)
AST SERPL-CCNC: 33 U/L — SIGNIFICANT CHANGE UP (ref 0–41)
BASE EXCESS BLDV CALC-SCNC: 2 MMOL/L — SIGNIFICANT CHANGE UP (ref -2–3)
BASOPHILS # BLD AUTO: 0.1 K/UL — SIGNIFICANT CHANGE UP (ref 0–0.2)
BASOPHILS NFR BLD AUTO: 1 % — SIGNIFICANT CHANGE UP (ref 0–1)
BILIRUB SERPL-MCNC: 0.3 MG/DL — SIGNIFICANT CHANGE UP (ref 0.2–1.2)
BUN SERPL-MCNC: 19 MG/DL — SIGNIFICANT CHANGE UP (ref 10–20)
CA-I SERPL-SCNC: 1.15 MMOL/L — SIGNIFICANT CHANGE UP (ref 1.15–1.33)
CALCIUM SERPL-MCNC: 8.8 MG/DL — SIGNIFICANT CHANGE UP (ref 8.5–10.1)
CHLORIDE SERPL-SCNC: 104 MMOL/L — SIGNIFICANT CHANGE UP (ref 98–110)
CO2 SERPL-SCNC: 22 MMOL/L — SIGNIFICANT CHANGE UP (ref 17–32)
CREAT SERPL-MCNC: 0.9 MG/DL — SIGNIFICANT CHANGE UP (ref 0.7–1.5)
EOSINOPHIL # BLD AUTO: 0.36 K/UL — SIGNIFICANT CHANGE UP (ref 0–0.7)
EOSINOPHIL NFR BLD AUTO: 3.5 % — SIGNIFICANT CHANGE UP (ref 0–8)
GAS PNL BLDV: 132 MMOL/L — LOW (ref 136–145)
GAS PNL BLDV: SIGNIFICANT CHANGE UP
GLUCOSE BLDC GLUCOMTR-MCNC: 80 MG/DL — SIGNIFICANT CHANGE UP (ref 70–99)
GLUCOSE SERPL-MCNC: 93 MG/DL — SIGNIFICANT CHANGE UP (ref 70–99)
HCO3 BLDV-SCNC: 28 MMOL/L — SIGNIFICANT CHANGE UP (ref 22–29)
HCT VFR BLD CALC: 50.6 % — SIGNIFICANT CHANGE UP (ref 42–52)
HCT VFR BLDA CALC: 51 % — SIGNIFICANT CHANGE UP (ref 39–51)
HGB BLD CALC-MCNC: 17 G/DL — SIGNIFICANT CHANGE UP (ref 12.6–17.4)
HGB BLD-MCNC: 16.8 G/DL — SIGNIFICANT CHANGE UP (ref 14–18)
IMM GRANULOCYTES NFR BLD AUTO: 0.3 % — SIGNIFICANT CHANGE UP (ref 0.1–0.3)
LACTATE BLDV-MCNC: 1.1 MMOL/L — SIGNIFICANT CHANGE UP (ref 0.5–2)
LYMPHOCYTES # BLD AUTO: 3.36 K/UL — SIGNIFICANT CHANGE UP (ref 1.2–3.4)
LYMPHOCYTES # BLD AUTO: 32.8 % — SIGNIFICANT CHANGE UP (ref 20.5–51.1)
MAGNESIUM SERPL-MCNC: 2.1 MG/DL — SIGNIFICANT CHANGE UP (ref 1.8–2.4)
MCHC RBC-ENTMCNC: 30.3 PG — SIGNIFICANT CHANGE UP (ref 27–31)
MCHC RBC-ENTMCNC: 33.2 G/DL — SIGNIFICANT CHANGE UP (ref 32–37)
MCV RBC AUTO: 91.3 FL — SIGNIFICANT CHANGE UP (ref 80–94)
MONOCYTES # BLD AUTO: 0.68 K/UL — HIGH (ref 0.1–0.6)
MONOCYTES NFR BLD AUTO: 6.6 % — SIGNIFICANT CHANGE UP (ref 1.7–9.3)
NEUTROPHILS # BLD AUTO: 5.72 K/UL — SIGNIFICANT CHANGE UP (ref 1.4–6.5)
NEUTROPHILS NFR BLD AUTO: 55.8 % — SIGNIFICANT CHANGE UP (ref 42.2–75.2)
NRBC # BLD: 0 /100 WBCS — SIGNIFICANT CHANGE UP (ref 0–0)
NT-PROBNP SERPL-SCNC: 186 PG/ML — SIGNIFICANT CHANGE UP (ref 0–300)
PCO2 BLDV: 46 MMHG — SIGNIFICANT CHANGE UP (ref 42–55)
PH BLDV: 7.39 — SIGNIFICANT CHANGE UP (ref 7.32–7.43)
PLATELET # BLD AUTO: 171 K/UL — SIGNIFICANT CHANGE UP (ref 130–400)
PO2 BLDV: 34 MMHG — SIGNIFICANT CHANGE UP
POTASSIUM BLDV-SCNC: 4.9 MMOL/L — SIGNIFICANT CHANGE UP (ref 3.5–5.1)
POTASSIUM SERPL-MCNC: 4.9 MMOL/L — SIGNIFICANT CHANGE UP (ref 3.5–5)
POTASSIUM SERPL-SCNC: 4.9 MMOL/L — SIGNIFICANT CHANGE UP (ref 3.5–5)
PROT SERPL-MCNC: 6.4 G/DL — SIGNIFICANT CHANGE UP (ref 6–8)
RBC # BLD: 5.54 M/UL — SIGNIFICANT CHANGE UP (ref 4.7–6.1)
RBC # FLD: 12.8 % — SIGNIFICANT CHANGE UP (ref 11.5–14.5)
SAO2 % BLDV: 63.3 % — SIGNIFICANT CHANGE UP
SARS-COV-2 RNA SPEC QL NAA+PROBE: SIGNIFICANT CHANGE UP
SODIUM SERPL-SCNC: 136 MMOL/L — SIGNIFICANT CHANGE UP (ref 135–146)
TROPONIN T SERPL-MCNC: <0.01 NG/ML — SIGNIFICANT CHANGE UP
WBC # BLD: 10.25 K/UL — SIGNIFICANT CHANGE UP (ref 4.8–10.8)
WBC # FLD AUTO: 10.25 K/UL — SIGNIFICANT CHANGE UP (ref 4.8–10.8)

## 2022-02-17 PROCEDURE — 99417 PROLNG OP E/M EACH 15 MIN: CPT

## 2022-02-17 PROCEDURE — 99215 OFFICE O/P EST HI 40 MIN: CPT | Mod: 57

## 2022-02-17 PROCEDURE — 99291 CRITICAL CARE FIRST HOUR: CPT

## 2022-02-17 PROCEDURE — 71045 X-RAY EXAM CHEST 1 VIEW: CPT | Mod: 26

## 2022-02-17 PROCEDURE — 93000 ELECTROCARDIOGRAM COMPLETE: CPT | Mod: 59

## 2022-02-17 PROCEDURE — 93280 PM DEVICE PROGR EVAL DUAL: CPT

## 2022-02-17 PROCEDURE — 93010 ELECTROCARDIOGRAM REPORT: CPT

## 2022-02-17 RX ORDER — ATORVASTATIN CALCIUM 80 MG/1
20 TABLET, FILM COATED ORAL AT BEDTIME
Refills: 0 | Status: DISCONTINUED | OUTPATIENT
Start: 2022-02-17 | End: 2022-02-18

## 2022-02-17 RX ORDER — TAMSULOSIN HYDROCHLORIDE 0.4 MG/1
0.4 CAPSULE ORAL AT BEDTIME
Refills: 0 | Status: DISCONTINUED | OUTPATIENT
Start: 2022-02-17 | End: 2022-02-18

## 2022-02-17 RX ORDER — OXYBUTYNIN CHLORIDE 5 MG
5 TABLET ORAL EVERY 12 HOURS
Refills: 0 | Status: DISCONTINUED | OUTPATIENT
Start: 2022-02-17 | End: 2022-02-18

## 2022-02-17 RX ORDER — PANTOPRAZOLE SODIUM 20 MG/1
40 TABLET, DELAYED RELEASE ORAL
Refills: 0 | Status: DISCONTINUED | OUTPATIENT
Start: 2022-02-17 | End: 2022-02-18

## 2022-02-17 RX ORDER — INFLUENZA VIRUS VACCINE 15; 15; 15; 15 UG/.5ML; UG/.5ML; UG/.5ML; UG/.5ML
0.7 SUSPENSION INTRAMUSCULAR ONCE
Refills: 0 | Status: COMPLETED | OUTPATIENT
Start: 2022-02-17 | End: 2022-02-17

## 2022-02-17 NOTE — ED PROVIDER NOTE - NS ED ROS FT
GEN:  no fever, no chills, no generalized weakness  NEURO:  no headache, +dizziness  ENT: no sore throat, no runny nose  CV:  +chest pain, no palpitations  RESP:  no sob, no cough  GI:  no nausea, no vomiting, no abdominal pain, no diarrhea  :  no dysuria  SKIN:  no rash, no bruising

## 2022-02-17 NOTE — ED PROVIDER NOTE - OBJECTIVE STATEMENT
77 yo man, PMH of HLD, BPH, PPM, presents from Dr. Olivia's office for a PPM lead fracture. Patient states he has had intermittent dizziness x15 days, intermittent, worse with ambulation, no alleviating factors, associated with left chest pain over PPM x3 days. Denies fevers, chills, shortness of breath, abdominal pain, nausea, vomiting, headache. 77 yo man, PMH of HLD, BPH, PPM, presents from Dr. Olivia's office for a PPM lead fracture. Patient states he has had intermittent dizziness x15 days, intermittent, worse with ambulation, no alleviating factors, associated with left chest pain over PPM x3 days. Denies fevers, chills, shortness of breath, abdominal pain, nausea, vomiting, headache. Dr. Olivia plans to repair PPM lead tomorrow am.

## 2022-02-17 NOTE — H&P ADULT - ATTENDING COMMENTS
76 year old man with hx of symptomatic caroline s/p PPM, sent from EP office for lead malfunctioning, patient was feeling dizzy, device interrogation showed av blocks, reprogramed to DOO, and admitted to CCU for monitoring, plan for lead revision/replacement 2/18  currently hemodynamically stable, monitor showed AV pacing rhythm.  -TTE  -Maintain electrolytes within normal ranges

## 2022-02-17 NOTE — ED ADULT NURSE NOTE - OBJECTIVE STATEMENT
Pt presented to ED c/o med eval. Pt BIBA from outpt cardiology office. Pt c/o pacemaker malfunction x2 days. Pt also c/o dizziness. Pt denies chest pain, denies n/v/d/fevers/chills. Pt A&Ox4, ambulatory baseline. Pt on Tele/O2 monitor and AED pads. Family at bedside.

## 2022-02-17 NOTE — PATIENT PROFILE ADULT - FALL HARM RISK - HARM RISK INTERVENTIONS
Assistance with ambulation/Assistance OOB with selected safe patient handling equipment/Communicate Risk of Fall with Harm to all staff/Discuss with provider need for PT consult/Monitor gait and stability/Reinforce activity limits and safety measures with patient and family/Sit up slowly, dangle for a short time, stand at bedside before walking/Tailored Fall Risk Interventions/Visual Cue: Yellow wristband and red socks/Bed in lowest position, wheels locked, appropriate side rails in place/Call bell, personal items and telephone in reach/Instruct patient to call for assistance before getting out of bed or chair/Non-slip footwear when patient is out of bed/[place] to call system/Physically safe environment - no spills, clutter or unnecessary equipment/Purposeful Proactive Rounding/Room/bathroom lighting operational, light cord in reach

## 2022-02-17 NOTE — H&P ADULT - ASSESSMENT
75 YO M with PMHx of HLD, BPH, symptomatic bradycardia s/p PPM (07/06/2021), presents from Dr. Olivia's office for a PPM lead malfunction.     IMPRESSION:  #PPM lead malfunction  #Symptomatic bradycardia s/p PPM (07/06/2021)  #HLD  #BPH    PLAN:    CNS: Avoid CNS Depressants     HEENT: Oral care. Aspiration precautions     PULMONARY:   - HOB @ 45. Monitor Pulse Ox. Keep > 93%. Supplement as needed.    CARDIOVASCULAR:   - 2D echo in AM  - Atorvastatin 20mg PO QHS   - Daily Weight. Strict I&Os    GI:   - GI prophylaxis; pantoprazole    - DASH/TLC diet. NPO after midnight    RENAL: Avoid nephrotoxic agents     INFECTIOUS DISEASE: Trend fever curve     HEMATOLOGICAL: DVT prophylaxis; Lovenox    ENDOCRINE: Monitor FS. Insulin protocol if needed     MUSCULOSKELETAL: Ambulate as tolerated     CODE STATUS: Full Code    DISPOSITION: Admit to CCU   75 YO M with PMHx of HLD, BPH, symptomatic bradycardia s/p PPM (07/06/2021), presents from Dr. Olivia's office for a PPM lead malfunction.     IMPRESSION:  #PPM lead malfunction  #Hx of symptomatic bradycardia s/p PPM (07/06/2021)  #HLD  #BPH    PLAN:    CNS: Avoid CNS Depressants     HEENT: Oral care. Aspiration precautions     PULMONARY:   - HOB @ 45. Monitor Pulse Ox. Keep > 93%. Supplement as needed.    CARDIOVASCULAR:   - 2D echo in AM  - Atorvastatin 20mg PO QHS   - Daily Weight. Strict I&Os  - Possible PPM lead replacement in AM    GI:   - GI prophylaxis; pantoprazole    - DASH/TLC diet. NPO after midnight    RENAL: Avoid nephrotoxic agents     INFECTIOUS DISEASE: Trend fever curve     HEMATOLOGICAL: DVT prophylaxis; SCD for now     ENDOCRINE: Monitor FS. Insulin protocol if needed     MUSCULOSKELETAL: Bedrest     CODE STATUS: Full Code    DISPOSITION: Admit to CCU   77 YO M with PMHx of HLD, BPH, symptomatic bradycardia s/p PPM (07/06/2021), presents from Dr. Olivia's office for a PPM lead malfunction.     IMPRESSION:  #PPM lead malfunction  #Hx of symptomatic bradycardia s/p PPM (07/06/2021)  #HLD  #BPH    PLAN:    CNS: Avoid CNS Depressants     HEENT: Oral care. Aspiration precautions     PULMONARY:   - HOB @ 45. Monitor Pulse Ox. Keep > 93%. Supplement as needed.    CARDIOVASCULAR:   - f/u TTE  - Atorvastatin 20mg PO QHS   - Daily Weight. Strict I&Os  - PPM lead replacement in AM    GI:   - GI prophylaxis; pantoprazole    - DASH/TLC diet. NPO after midnight    RENAL: Avoid nephrotoxic agents     INFECTIOUS DISEASE: Trend fever curve     HEMATOLOGICAL: DVT prophylaxis; SCD for now     ENDOCRINE: Monitor FS. Insulin protocol if needed     MUSCULOSKELETAL: Bedrest     CODE STATUS: Full Code    DISPOSITION: Admit to CCU

## 2022-02-17 NOTE — H&P ADULT - NSHPPHYSICALEXAM_GEN_ALL_CORE
CONSTITUTIONAL: AAOx3  HEAD: Atraumatic, normocephalic  EYES: EOM intact  ENT: Supple, moist mucous membranes  PULMONARY: Dec BS  CARDIOVASCULAR: Regular rate and rhythm; paced  GASTROINTESTINAL: Soft, non-tender, non-distended  NEUROLOGY: Moves all extremities

## 2022-02-17 NOTE — ED PROVIDER NOTE - CLINICAL SUMMARY MEDICAL DECISION MAKING FREE TEXT BOX
MD 6-year-old male presents to the ED for pacemaker malfunction. Patient was evaluated by cardiology outpatient and noted to have a possible fractured lead. Patient's heart rate was set to 80 by outpatient cardiology and sent in for evaluation and admission to CCU. On my exam patient is resting comfortably with a heart rate of 80, patient placed on monitor resultant ventricularly rhythm. Blood pressure within normal limits. EKG noted to have a ventricular paced rhythm. Patient is not in extremis. We obtained labs and chest x-ray. Chest x-ray revealed an appropriately placed leads. Will order PA and lateral views to have a better view of the pacemaker wire anatomy. Labs reviewed noted to be within normal limits. Evaluated by CCU at bedside. Admitted.

## 2022-02-17 NOTE — H&P ADULT - HISTORY OF PRESENT ILLNESS
75 YO M with PMHx of HLD, BPH, symptomatic bradycardia s/p PPM (07/06/2021), presents from Dr. Olivia's office for a PPM lead malfunction.   Patient states he has had intermittent dizziness for almost 3 days now. Collateral history obtained from the grand-daughter at bedside  The dizziness is intermittent, worse with ambulation, no alleviating factors. Also reports associated left chest pain at the PPM side for the last  3 days.   Denies fevers, chills, shortness of breath, abdominal pain, nausea, vomiting, headache.  He followed with Dr. Olivia at his office today and was found to have RV lead malfunction with recurrent noise with V pacing inhibition. The device was reprogrammed to DOO at 80 bpm with RV output at 5.0V and he was sent to the ED  In the ED labs were unremarkable  75 YO M with PMHx of HLD, BPH, symptomatic bradycardia s/p PPM (07/06/2021), presents from Dr. Olivia's office for a PPM lead malfunction.   He initially presented in July 2021 for dizziness and EKG at that time showed AV block. He had a PPM placed during that hospital visit and was discharged. Today he visited his electrophysiologist (Dr. Olivia) and complained of intermittent dizziness for almost 3 days now. Collateral history obtained from the grand-daughter at bedside. He described the dizziness as intermittent, worse with ambulation, no alleviating factors. Also reports associated left chest pain at the PPM side for the last  3 days.   Denies fevers, chills, shortness of breath, abdominal pain, nausea, vomiting, headache.  On his outpatient today he was found to have RV lead malfunction with recurrent noise with V pacing inhibition. The device was reprogrammed to DOO at 80 bpm with RV output at 5.0V and he was sent to the ED  In the ED labs were unremarkable

## 2022-02-17 NOTE — ED PROVIDER NOTE - PHYSICAL EXAMINATION
CONSTITUTIONAL: Well-developed; well-nourished  SKIN: warm, dry  HEAD: Normocephalic  EYES: no conjunctival injection  ENT: No nasal discharge; airway clear.  NECK: Supple; non tender.  CARD: S1, S2 normal; Regular rate and rhythm. +left PPM  RESP: No wheezes, rales or rhonchi.  ABD: soft ntnd  EXT: Normal ROM.  No clubbing, cyanosis or edema.   NEURO: Alert, oriented, grossly unremarkable.  PSYCH: Cooperative, appropriate.

## 2022-02-17 NOTE — ED ADULT TRIAGE NOTE - CHIEF COMPLAINT QUOTE
Pt BIBA from cardiologist office for pacemaker malfunction. pt c/o chest pain, SOB and dizziness x 3 days.

## 2022-02-18 ENCOUNTER — TRANSCRIPTION ENCOUNTER (OUTPATIENT)
Age: 77
End: 2022-02-18

## 2022-02-18 LAB
ALBUMIN SERPL ELPH-MCNC: 3.8 G/DL — SIGNIFICANT CHANGE UP (ref 3.5–5.2)
ALP SERPL-CCNC: 95 U/L — SIGNIFICANT CHANGE UP (ref 30–115)
ALT FLD-CCNC: 21 U/L — SIGNIFICANT CHANGE UP (ref 0–41)
ANION GAP SERPL CALC-SCNC: 10 MMOL/L — SIGNIFICANT CHANGE UP (ref 7–14)
AST SERPL-CCNC: 22 U/L — SIGNIFICANT CHANGE UP (ref 0–41)
BASOPHILS # BLD AUTO: 0.07 K/UL — SIGNIFICANT CHANGE UP (ref 0–0.2)
BASOPHILS NFR BLD AUTO: 0.8 % — SIGNIFICANT CHANGE UP (ref 0–1)
BILIRUB SERPL-MCNC: 0.4 MG/DL — SIGNIFICANT CHANGE UP (ref 0.2–1.2)
BUN SERPL-MCNC: 17 MG/DL — SIGNIFICANT CHANGE UP (ref 10–20)
CALCIUM SERPL-MCNC: 8.4 MG/DL — LOW (ref 8.5–10.1)
CHLORIDE SERPL-SCNC: 108 MMOL/L — SIGNIFICANT CHANGE UP (ref 98–110)
CO2 SERPL-SCNC: 21 MMOL/L — SIGNIFICANT CHANGE UP (ref 17–32)
CREAT SERPL-MCNC: 0.8 MG/DL — SIGNIFICANT CHANGE UP (ref 0.7–1.5)
EOSINOPHIL # BLD AUTO: 0.37 K/UL — SIGNIFICANT CHANGE UP (ref 0–0.7)
EOSINOPHIL NFR BLD AUTO: 4.1 % — SIGNIFICANT CHANGE UP (ref 0–8)
GLUCOSE SERPL-MCNC: 85 MG/DL — SIGNIFICANT CHANGE UP (ref 70–99)
HCT VFR BLD CALC: 49.4 % — SIGNIFICANT CHANGE UP (ref 42–52)
HGB BLD-MCNC: 16.6 G/DL — SIGNIFICANT CHANGE UP (ref 14–18)
IMM GRANULOCYTES NFR BLD AUTO: 0.3 % — SIGNIFICANT CHANGE UP (ref 0.1–0.3)
LYMPHOCYTES # BLD AUTO: 2.79 K/UL — SIGNIFICANT CHANGE UP (ref 1.2–3.4)
LYMPHOCYTES # BLD AUTO: 31.2 % — SIGNIFICANT CHANGE UP (ref 20.5–51.1)
MAGNESIUM SERPL-MCNC: 2.1 MG/DL — SIGNIFICANT CHANGE UP (ref 1.8–2.4)
MCHC RBC-ENTMCNC: 30.1 PG — SIGNIFICANT CHANGE UP (ref 27–31)
MCHC RBC-ENTMCNC: 33.6 G/DL — SIGNIFICANT CHANGE UP (ref 32–37)
MCV RBC AUTO: 89.7 FL — SIGNIFICANT CHANGE UP (ref 80–94)
MONOCYTES # BLD AUTO: 0.67 K/UL — HIGH (ref 0.1–0.6)
MONOCYTES NFR BLD AUTO: 7.5 % — SIGNIFICANT CHANGE UP (ref 1.7–9.3)
NEUTROPHILS # BLD AUTO: 5 K/UL — SIGNIFICANT CHANGE UP (ref 1.4–6.5)
NEUTROPHILS NFR BLD AUTO: 56.1 % — SIGNIFICANT CHANGE UP (ref 42.2–75.2)
NRBC # BLD: 0 /100 WBCS — SIGNIFICANT CHANGE UP (ref 0–0)
PHOSPHATE SERPL-MCNC: 2.5 MG/DL — SIGNIFICANT CHANGE UP (ref 2.1–4.9)
PLATELET # BLD AUTO: 138 K/UL — SIGNIFICANT CHANGE UP (ref 130–400)
POTASSIUM SERPL-MCNC: 4.2 MMOL/L — SIGNIFICANT CHANGE UP (ref 3.5–5)
POTASSIUM SERPL-SCNC: 4.2 MMOL/L — SIGNIFICANT CHANGE UP (ref 3.5–5)
PROT SERPL-MCNC: 5.9 G/DL — LOW (ref 6–8)
RBC # BLD: 5.51 M/UL — SIGNIFICANT CHANGE UP (ref 4.7–6.1)
RBC # FLD: 12.8 % — SIGNIFICANT CHANGE UP (ref 11.5–14.5)
SODIUM SERPL-SCNC: 139 MMOL/L — SIGNIFICANT CHANGE UP (ref 135–146)
WBC # BLD: 8.93 K/UL — SIGNIFICANT CHANGE UP (ref 4.8–10.8)
WBC # FLD AUTO: 8.93 K/UL — SIGNIFICANT CHANGE UP (ref 4.8–10.8)

## 2022-02-18 PROCEDURE — 71045 X-RAY EXAM CHEST 1 VIEW: CPT | Mod: 26

## 2022-02-18 PROCEDURE — 99233 SBSQ HOSP IP/OBS HIGH 50: CPT

## 2022-02-18 PROCEDURE — 93306 TTE W/DOPPLER COMPLETE: CPT | Mod: 26

## 2022-02-18 PROCEDURE — 33235 REMOVAL PACEMAKER ELECTRODE: CPT

## 2022-02-18 PROCEDURE — 93010 ELECTROCARDIOGRAM REPORT: CPT

## 2022-02-18 PROCEDURE — 33216 INSERT 1 ELECTRODE PM-DEFIB: CPT

## 2022-02-18 RX ORDER — ONDANSETRON 8 MG/1
4 TABLET, FILM COATED ORAL ONCE
Refills: 0 | Status: DISCONTINUED | OUTPATIENT
Start: 2022-02-18 | End: 2022-02-18

## 2022-02-18 RX ORDER — ATORVASTATIN CALCIUM 80 MG/1
20 TABLET, FILM COATED ORAL AT BEDTIME
Refills: 0 | Status: DISCONTINUED | OUTPATIENT
Start: 2022-02-18 | End: 2022-02-19

## 2022-02-18 RX ORDER — MORPHINE SULFATE 50 MG/1
1 CAPSULE, EXTENDED RELEASE ORAL
Refills: 0 | Status: DISCONTINUED | OUTPATIENT
Start: 2022-02-18 | End: 2022-02-18

## 2022-02-18 RX ORDER — SODIUM CHLORIDE 9 MG/ML
1000 INJECTION, SOLUTION INTRAVENOUS
Refills: 0 | Status: DISCONTINUED | OUTPATIENT
Start: 2022-02-18 | End: 2022-02-18

## 2022-02-18 RX ORDER — CHLORHEXIDINE GLUCONATE 213 G/1000ML
1 SOLUTION TOPICAL
Refills: 0 | Status: DISCONTINUED | OUTPATIENT
Start: 2022-02-18 | End: 2022-02-18

## 2022-02-18 RX ORDER — MORPHINE SULFATE 50 MG/1
2 CAPSULE, EXTENDED RELEASE ORAL
Refills: 0 | Status: DISCONTINUED | OUTPATIENT
Start: 2022-02-18 | End: 2022-02-18

## 2022-02-18 RX ORDER — TAMSULOSIN HYDROCHLORIDE 0.4 MG/1
0.4 CAPSULE ORAL AT BEDTIME
Refills: 0 | Status: DISCONTINUED | OUTPATIENT
Start: 2022-02-18 | End: 2022-02-19

## 2022-02-18 RX ORDER — PANTOPRAZOLE SODIUM 20 MG/1
40 TABLET, DELAYED RELEASE ORAL
Refills: 0 | Status: DISCONTINUED | OUTPATIENT
Start: 2022-02-18 | End: 2022-02-19

## 2022-02-18 RX ORDER — OXYBUTYNIN CHLORIDE 5 MG
5 TABLET ORAL EVERY 12 HOURS
Refills: 0 | Status: DISCONTINUED | OUTPATIENT
Start: 2022-02-18 | End: 2022-02-19

## 2022-02-18 RX ADMIN — MORPHINE SULFATE 2 MILLIGRAM(S): 50 CAPSULE, EXTENDED RELEASE ORAL at 12:56

## 2022-02-18 RX ADMIN — MORPHINE SULFATE 2 MILLIGRAM(S): 50 CAPSULE, EXTENDED RELEASE ORAL at 12:37

## 2022-02-18 RX ADMIN — SODIUM CHLORIDE 100 MILLILITER(S): 9 INJECTION, SOLUTION INTRAVENOUS at 12:56

## 2022-02-18 RX ADMIN — Medication 5 MILLIGRAM(S): at 18:30

## 2022-02-18 RX ADMIN — Medication 75 MILLIGRAM(S): at 22:07

## 2022-02-18 RX ADMIN — Medication 75 MILLIGRAM(S): at 16:46

## 2022-02-18 RX ADMIN — TAMSULOSIN HYDROCHLORIDE 0.4 MILLIGRAM(S): 0.4 CAPSULE ORAL at 21:48

## 2022-02-18 RX ADMIN — Medication 5 MILLIGRAM(S): at 06:28

## 2022-02-18 RX ADMIN — ATORVASTATIN CALCIUM 20 MILLIGRAM(S): 80 TABLET, FILM COATED ORAL at 00:20

## 2022-02-18 RX ADMIN — PANTOPRAZOLE SODIUM 40 MILLIGRAM(S): 20 TABLET, DELAYED RELEASE ORAL at 06:28

## 2022-02-18 RX ADMIN — Medication 75 MILLIGRAM(S): at 00:21

## 2022-02-18 RX ADMIN — ATORVASTATIN CALCIUM 20 MILLIGRAM(S): 80 TABLET, FILM COATED ORAL at 21:49

## 2022-02-18 RX ADMIN — Medication 75 MILLIGRAM(S): at 06:27

## 2022-02-18 RX ADMIN — TAMSULOSIN HYDROCHLORIDE 0.4 MILLIGRAM(S): 0.4 CAPSULE ORAL at 00:21

## 2022-02-18 NOTE — DISCHARGE NOTE PROVIDER - PROVIDER TOKENS
PROVIDER:[TOKEN:[[id number]],FOLLOWUP:[2 weeks]] PROVIDER:[TOKEN:[[id number]],FOLLOWUP:[1 week]]

## 2022-02-18 NOTE — CONSULT NOTE ADULT - SUBJECTIVE AND OBJECTIVE BOX
Outpt cardiologist:  Dr. Olivia    HPI:  77 YO M with PMHx of HLD, BPH, symptomatic bradycardia s/p PPM (2021), presents from Dr. Olivia's office for a PPM lead malfunction.   He initially presented in 2021 for dizziness and EKG at that time showed AV block. He had a PPM placed during that hospital visit and was discharged. Today he visited his electrophysiologist (Dr. Olivia) and complained of intermittent dizziness for almost 3 days now. Collateral history obtained from the grand-daughter at bedside. He described the dizziness as intermittent, worse with ambulation, no alleviating factors. Also reports associated left chest pain at the PPM side for the last  3 days.   Denies fevers, chills, shortness of breath, abdominal pain, nausea, vomiting, headache.  On his outpatient today he was found to have RV lead malfunction with recurrent noise with V pacing inhibition. The device was reprogrammed to DOO at 80 bpm with RV output at 5.0V and he was sent to the ED  In the ED labs were unremarkable  (2022 19:16)      ---  cardio fellow additional notes:    noise seen on interrogation -- intermittently not pacing V.  need to r/o lead fx.  Pt is ppm dependant.   c/o intermittent dizzines/lightheadedness over past few days.        PAST MEDICAL & SURGICAL HISTORY  HTN (hypertension)    History of hyperlipidemia    Chronic low back pain with sciatica    Acute depression    Arrhythmia    History of back injury  sx lower back 1986        FAMILY HISTORY:  FAMILY HISTORY:      SOCIAL HISTORY:  Social History:      ALLERGIES:  No Known Allergies      MEDICATIONS:  atorvastatin 20 milliGRAM(s) Oral at bedtime  chlorhexidine 4% Liquid 1 Application(s) Topical <User Schedule>  influenza  Vaccine (HIGH DOSE) 0.7 milliLiter(s) IntraMuscular once  oxybutynin 5 milliGRAM(s) Oral every 12 hours  pantoprazole    Tablet 40 milliGRAM(s) Oral before breakfast  pregabalin 75 milliGRAM(s) Oral three times a day  tamsulosin 0.4 milliGRAM(s) Oral at bedtime    PRN:      HOME MEDICATIONS:  Home Medications:  atorvastatin 20 mg oral tablet: 1 tab(s) orally once a day (14 May 2019 06:55)  Calcium 600+D Plus Minerals oral tablet, chewable: 1 tab(s) orally once a day (14 May 2019 06:55)  celecoxib 200 mg oral capsule: 1 cap(s) orally once a day (2021 23:36)  docusate sodium 100 mg oral tablet: 2 tab(s) orally once a day (at bedtime) (2021 23:35)  Lyrica 75 mg oral capsule: 1 cap(s) orally 3 times a day (2021 23:37)  oxybutynin 15 mg/24 hr oral tablet, extended release: 1 tab(s) orally once a day (2021 23:36)      VITALS:   T(F): 98 ( @ 04:00), Max: 98.7 ( @ 17:00)  HR: 80 ( @ 05:00) (80 - 81)  BP: 125/66 ( @ 05:00) (117/70 - 149/89)  BP(mean): 90 ( @ 05:00) (88 - 103)  RR: 15 ( @ 05:00) (14 - 25)  SpO2: 97% ( @ 05:00) (93% - 98%)    I&O's Summary    2022 07:01  -  2022 07:00  --------------------------------------------------------  IN: 200 mL / OUT: 550 mL / NET: -350 mL        REVIEW OF SYSTEMS:  CONSTITUTIONAL: No weakness, fevers or chills  HEENT: No visual changes, neck/ear pain  RESPIRATORY: No cough, sob  CARDIOVASCULAR: See HPI  GASTROINTESTINAL: No abdominal pain. No nausea, vomiting, diarrhea   GENITOURINARY: No dysuria, frequency or hematuria  NEUROLOGICAL: No new focal deficits  SKIN: No new rashes    PHYSICAL EXAM:  General: Not in distress.  Non-toxic appearing.   HEENT: EOMI  Cardio: regular, S1, S2  Pulm: B/L BS.   Abdomen: Soft, non-tender, non-distended. Normoactive bowel sounds  Extremities: No edema b/l le  Neuro: alert and responsive.     LABS:                        16.6   8.93  )-----------( 138      ( 2022 04:30 )             49.4     18    139  |  108  |  17  ----------------------------<  85  4.2   |  21  |  0.8    Ca    8.4<L>      2022 04:30  Phos  2.5       Mg     2.1     18    TPro  5.9<L>  /  Alb  3.8  /  TBili  0.4  /  DBili  x   /  AST  22  /  ALT  21  /  AlkPhos  95  18      Troponin T, Serum: <0.01 ng/mL (22 @ 17:15)    CARDIAC MARKERS ( 2022 17:15 )  x     / <0.01 ng/mL / x     / x     / x            Troponin trend:    Serum Pro-Brain Natriuretic Peptide: 186 pg/mL (22 @ 17:15)      COVID-19 PCR: NotDetec (2022 17:15)      RADIOLOGY:  -CXR:  -TTE:  < from: TTE Echo Complete w/o Contrast w/ Doppler (21 @ 10:13) >   1. Normal global left ventricular systolic function.   2. Mildly enlarged left atrium.   3. Normal right atrial size.   4. Trace mitral valve regurgitation.   5. Mild tricuspid regurgitation.   6. Mild aortic regurgitation.    < end of copied text >  -CCTA:  -STRESS TEST:  -CATHETERIZATION:  -OTHER:  EC Lead ECG:   Ventricular Rate 80 BPM    Atrial Rate 80 BPM    P-R Interval 174 ms    QRS Duration 170 ms    Q-T Interval 446 ms    QTC Calculation(Bazett) 514 ms    P Axis 12 degrees    R Axis -71 degrees    T Axis 77 degrees    Diagnosis Line AV dual-paced rhythm  Abnormal ECG    Confirmed by JAMES IVORY MD ([id number]) on 2022 7:12:44 AM ( @ 05:18)      TELEMETRY EVENTS:

## 2022-02-18 NOTE — DISCHARGE NOTE PROVIDER - NSDCFUADDINST_GEN_ALL_CORE_FT
- Keflex 500 mg PO q12 h x 5 days  - FU in the EP office for wound check with ___NP in 1 weeks  Dr Olivia office   [address] [facility], [address]  [phone]       No Heavy lifting >5 lbs. Do not raise your arm above shoulder level for 4-6 weeks.   No driving for 2weeks  No shower, bathtub, no wetting device site until follow up visit  Do NOT remove dressing until follow up visit, leave Steri-strips in place

## 2022-02-18 NOTE — CHART NOTE - NSCHARTNOTEFT_GEN_A_CORE
PACU ANESTHESIA ADMISSION NOTE      Procedure:   Post op diagnosis:      ____  Intubated  TV:______       Rate: ______      FiO2: ______    _x___  Patent Airway    x____  Full return of protective reflexes    _x___  Full recovery from anesthesia / back to baseline status    Vitals:  temp(F) 96.9  /75  spo2 98  RR 18  pulse 59    Mental Status:  _x ___ Awake   _____ Alert   _____ Drowsy   _____ Sedated    Nausea/Vomiting:  __x __ NO  ______Yes,   See Post - Op Orders          Pain Scale (0-10):  _____    Treatment: ____ None   x  ____ See Post - Op/PCA Orders    Post - Operative Fluids:   ____ Oral   ____ See Post - Op Orders    Plan: Discharge:   ____Home       _____Floor     ___x __Critical Care    _____  Other:_________________    Comments: uneventful anesthesia course no complications. Vitals stable. Pt transferred to PACU

## 2022-02-18 NOTE — DISCHARGE NOTE PROVIDER - NSDCCPCAREPLAN_GEN_ALL_CORE_FT
PRINCIPAL DISCHARGE DIAGNOSIS  Diagnosis: Pacemaker lead fracture  Assessment and Plan of Treatment: Usted vino al hospital porque contreras cardiologo perla que habia un problema con contreras marcapasos. Dr. Olivia lo corregio el 2/18. Por favor maria del rosario erik medicamentos y yosvany hakeem con Dr. Olivia en contreras oficina.       PRINCIPAL DISCHARGE DIAGNOSIS  Diagnosis: Pacemaker lead fracture  Assessment and Plan of Treatment: Usted vino al hospital porque contreras cardiologo perla que habia un problema con contreras marcapasos. Dr. Olivia lo corrigio el 2/18. Por favor maria del rosario erik medicamentos y yosvany hakeem con Dr. Olivia en contreras oficina.

## 2022-02-18 NOTE — DISCHARGE NOTE PROVIDER - NSDCFUSCHEDAPPT_GEN_ALL_CORE_FT
ABIEL QUINN ; 02/24/2022 ; NPP Ophthal  ABIEL Cortes ; 04/01/2022 ; NPP Internal Med [address] ABIEL Cortes ; 05/16/2022 ; NPP Cardio [address] [facility]

## 2022-02-18 NOTE — CONSULT NOTE ADULT - ASSESSMENT
IMPRESSION  -  PPM malfunction  (ppm dependant pt;  noise seen on interrogation;  intermittently not pacing V appropriately)      PLAN  - echo first thing in am  - will review CXR this am  - assess volume status in am  - keep npo for now  - possible lead replacement later today  - ep to follow

## 2022-02-18 NOTE — PROGRESS NOTE ADULT - ASSESSMENT
IMPRESSION:  #PPM lead malfunction  #Hx of symptomatic bradycardia s/p PPM (07/06/2021)  #HLD  #BPH    PLAN:    CNS: Avoid CNS Depressants     HEENT: Oral care. Aspiration precautions     PULMONARY:   - HOB @ 45. Monitor Pulse Ox. Keep > 93%. Supplement as needed.    CARDIOVASCULAR:   - 2D echo in AM  - Atorvastatin 20mg PO QHS   - Daily Weight. Strict I&Os  - Possible PPM lead replacement in AM    GI:   - GI prophylaxis; pantoprazole    - DASH/TLC diet. NPO after midnight    RENAL: Avoid nephrotoxic agents     INFECTIOUS DISEASE: Trend fever curve     HEMATOLOGICAL: DVT prophylaxis; SCD for now     ENDOCRINE: Monitor FS. Insulin protocol if needed     MUSCULOSKELETAL: Bedrest     CODE STATUS: Full Code    DISPOSITION: Admit to CCU     IMPRESSION:    PPM lead malfunction  Hx of symptomatic bradycardia s/p PPM (07/06/2021)  DLD  BPH    PLAN:    CNS: Avoid CNS Depressants     HEENT: Oral care. Aspiration precautions     PULMONARY:   - HOB @ 45. Monitor Pulse Ox. Keep > 93%. Supplement as needed.    CARDIOVASCULAR:   - 2D echo in AM  - Atorvastatin 20mg PO QHS   - Daily Weight. Strict I&Os  - PPM lead replacement in AM    GI:   - GI prophylaxis; pantoprazole      RENAL: Avoid nephrotoxic agents     INFECTIOUS DISEASE: Trend fever curve     HEMATOLOGICAL: DVT prophylaxis; SCD for now     ENDOCRINE: Monitor FS. Insulin protocol if needed     MUSCULOSKELETAL: Bedrest     CCU monitoring     IMPRESSION:    PPM lead malfunction  Hx of symptomatic bradycardia s/p PPM (07/06/2021)  DLD  BPH    PLAN:    CNS: Avoid CNS Depressants     HEENT: Oral care. Aspiration precautions     PULMONARY:   - HOB @ 45. Monitor Pulse Ox. Keep > 93%. Supplement as needed.    CARDIOVASCULAR:   - f/u TTE  - Atorvastatin 20mg PO QHS   - Daily Weight. Strict I&Os  - PPM lead replacement in AM    GI:   - GI prophylaxis; pantoprazole      RENAL: Avoid nephrotoxic agents     INFECTIOUS DISEASE: Trend fever curve     HEMATOLOGICAL: DVT prophylaxis; SCD for now     ENDOCRINE: Monitor FS. Insulin protocol if needed     MUSCULOSKELETAL: Bedrest     CCU monitoring     IMPRESSION:    PPM lead malfunction  Hx of symptomatic bradycardia s/p PPM (07/06/2021)  DLD  BPH    PLAN:    CNS: Avoid CNS Depressants     HEENT: Oral care. Aspiration precautions     PULMONARY:   - HOB @ 45. Monitor Pulse Ox. Supplement as needed.    CARDIOVASCULAR:   - f/u TTE  - Atorvastatin 20mg PO QHS   - Daily Weight. Strict I&Os  - PPM lead replacement today    GI:   - GI prophylaxis; pantoprazole      RENAL: Avoid nephrotoxic agents     INFECTIOUS DISEASE: Trend fever curve     HEMATOLOGICAL: DVT prophylaxis; SCD for now     ENDOCRINE: Monitor FS. Insulin protocol if needed     MUSCULOSKELETAL: IAT    CCU monitoring

## 2022-02-18 NOTE — DISCHARGE NOTE PROVIDER - CARE PROVIDER_API CALL
Emelyn Olivia (MD)  Cardiac Electrophysiology; Cardiology; Internal Medicine  [address] [place] [address]  Phone: [phone]  Fax: [phone]  Follow Up Time: 2 weeks   Emelyn Olivia (MD)  Cardiac Electrophysiology; Cardiology; Internal Medicine  [address] [place] [address]  Phone: [phone]  Fax: [phone]  Follow Up Time: 1 week

## 2022-02-18 NOTE — DISCHARGE NOTE PROVIDER - NSDCMRMEDTOKEN_GEN_ALL_CORE_FT
atorvastatin 20 mg oral tablet: 1 tab(s) orally once a day  Calcium 600+D Plus Minerals oral tablet, chewable: 1 tab(s) orally once a day  celecoxib 200 mg oral capsule: 1 cap(s) orally once a day  docusate sodium 100 mg oral tablet: 2 tab(s) orally once a day (at bedtime)  Flomax 0.4 mg oral capsule: 1 cap(s) orally once a day (at bedtime)   Lyrica 75 mg oral capsule: 1 cap(s) orally 3 times a day  oxybutynin 15 mg/24 hr oral tablet, extended release: 1 tab(s) orally once a day   atorvastatin 20 mg oral tablet: 1 tab(s) orally once a day  Calcium 600+D Plus Minerals oral tablet, chewable: 1 tab(s) orally once a day  celecoxib 200 mg oral capsule: 1 cap(s) orally once a day  cephalexin 500 mg oral capsule: 1 cap(s) orally 2 times a day   docusate sodium 100 mg oral tablet: 2 tab(s) orally once a day (at bedtime)  Flomax 0.4 mg oral capsule: 1 cap(s) orally once a day (at bedtime)   Lyrica 75 mg oral capsule: 1 cap(s) orally 3 times a day  oxybutynin 15 mg/24 hr oral tablet, extended release: 1 tab(s) orally once a day

## 2022-02-18 NOTE — CHART NOTE - NSCHARTNOTEFT_GEN_A_CORE
ELECTROPHYSIOLOGY PROCEDURE:  Dual Chamber Pacemaker Implantation  IMPRESSION:  Successful RV pacing lead insertion and removal ([place] [place], Dependent).  PLAN:   - Overnight CCU monitoring  - CXR and Interrogation in am  - PO antibiotics on discharge  - Anticipate DC from EP standpoint in am if stable.    : Emelyn Olivia M.D.  INDICATION FOR PROCEDURE: Complete heart block; RV lead malfunction    CONSENT:   The risks, benefits, indications and alternatives to the procedure were explained in detail to the patient and available family members prior to the procedure. The patient and available family members expressed a good understanding of the procedure and risks. All questions asked were answered and consent was obtained. A representative from the device company was present to provide clinical support.    SEDATION:   The patient was transported to the Electrophysiology Laboratory in a non-sedated state and was placed supine on the fluoroscopy table. Sedation was provided by anesthesia team. The patient underwent continuous blood pressure, heart rate and O2 saturation monitoring throughout the procedure with supplemental oxygen utilized as needed.    DETAILS OF PROCEDURE:    Informed consent was obtained, and the patient was brought to the EP lab in a fasting state. The patient  was prepped and draped in the usual strict sterile fashion.   After the antibiotic was completely infused, 60 cc lidocaine was infiltrated into the area over prior incision. Using a #10 scalpel, an incision was made. The incision was extended to prior generator using blunt dissection. Then left extra-thoracic axillary vein was accessed once under fluoroscopy and venogram. A guidewire was placed into the vein. The guidewire was followed down to the IVC to confirm venous access and secured in place. Then the sheath was placed over the guidewire.     Then the ventricular lead was advanced into the RV. The RV lead was fixated to the right ventricular low septum. Appropriate sensing and thresholds were obtained. No diaphragmatic pacing occurred at 10 V and 1.5 ms. The lead position was confirmed in the SERGE and MCDANIEL projections.    Then the old right ventricular lead was unscrewed and removed with gentle traction. There was no hemodynamic changes throughout the case.    The sheath was removed. The lead was then secured to the pectoralis muscle using Ethibond. Lead measurements were then rechecked.    Then the prepectoral pocket was fashioned. The leads were attached to the generator. The system was placed in the pocket and connected to the leads. The generator was sutured to the pocket. The generator and leads system were visualized under fluoroscopy. Appropriate redundancy/slack in the leads were noted. The pins of the leads were beyond the set screws. TyRx pouch was placed on top the device.    Hemostasis was reverified. The pocket was then closed in two layers. Dermabond and a sterile dressing were placed.   The patient was transferred to the pre-post room in stable condition.    EQUIPMENT IMPLANTED AND BASELINE PARAMETERS  Pulse Generator   : [employer]	  Model Number: 	Remotiumurity MRI 2272  Serial Number:	       Atrial Lead:  Model Number:	Tendril STS 2088 (52 cm)  Serial Number:	  Threshold:	 V at 0.4 ms  Sensing:		mV  Impedance:	 Ohms  						  Right Ventricular Lead:  Model Number:	Tendril STS 2088 (58 cm)  Serial Number:	  Threshold:	 V at 0.4 ms  Sensing:		  mV  Impedance:	 Ohms      EBL: 10 cc  Complication: None    IMPRESSION:  Successful dual chamber pacemaker implant ([employer] [place], Dependent). ELECTROPHYSIOLOGY PROCEDURE:  RV pacing lead insertion and removal  IMPRESSION:  Successful RV pacing lead insertion and removal ([place] [employer], Dependent).  PLAN:   - Overnight CCU monitoring  - CXR and Interrogation in am  - PO antibiotics on discharge  - Anticipate DC from EP standpoint in am if stable.    : Emelyn Olivia M.D.  INDICATION FOR PROCEDURE: Complete heart block; RV lead malfunction    CONSENT:   The risks, benefits, indications and alternatives to the procedure were explained in detail to the patient and available family members prior to the procedure. The patient and available family members expressed a good understanding of the procedure and risks. All questions asked were answered and consent was obtained. A representative from the device company was present to provide clinical support.    SEDATION:   The patient was transported to the Electrophysiology Laboratory in a non-sedated state and was placed supine on the fluoroscopy table. Sedation was provided by anesthesia team. The patient underwent continuous blood pressure, heart rate and O2 saturation monitoring throughout the procedure with supplemental oxygen utilized as needed.    DETAILS OF PROCEDURE:    Informed consent was obtained, and the patient was brought to the EP lab in a fasting state. The patient  was prepped and draped in the usual strict sterile fashion.   After the antibiotic was completely infused, 60 cc lidocaine was infiltrated into the area over prior incision. Using a #10 scalpel, an incision was made. The incision was extended to prior generator using blunt dissection. Then left extra-thoracic axillary vein was accessed once under fluoroscopy and venogram. A guidewire was placed into the vein. The guidewire was followed down to the IVC to confirm venous access and secured in place. Then the sheath was placed over the guidewire.     Then the ventricular lead was advanced into the RV. The RV lead was fixated to the right ventricular low septum. Appropriate sensing and thresholds were obtained. No diaphragmatic pacing occurred at 10 V and 1.5 ms. The lead position was confirmed in the [place] and MCDANIEL projections.    Then the old right ventricular lead was unscrewed and removed with gentle traction. There was no hemodynamic changes throughout the case.    The sheath was removed. The lead was then secured to the pectoralis muscle using Ethibond. Lead measurements were then rechecked.    Then the prepectoral pocket was fashioned. The leads were attached to the generator. The system was placed in the pocket and connected to the leads. The generator was sutured to the pocket. The generator and leads system were visualized under fluoroscopy. Appropriate redundancy/slack in the leads were noted. The pins of the leads were beyond the set screws. TyRx pouch was placed on top the device.    Hemostasis was reverified. The pocket was then closed in two layers. Dermabond and a sterile dressing were placed.   The patient was transferred to the pre-post room in stable condition.    EQUIPMENT IMPLANTED AND BASELINE PARAMETERS  Pulse Generator   : [employer]	  Model Number: 	[employer] MRI 2272  Serial Number:	[id number]       Atrial Lead:  Model Number:	Tendril STS 2088 (52 cm)  Serial Number:	[id number]  Threshold:	1.0 V at 0.4 ms  Sensin.0 mV  Impedance:	440 Ohms  						  Right Ventricular Lead:  Model Number:	Tendril STS 2088 (58 cm)  Serial Number:	[id number]  Threshold:	0.7 V at 0.4 ms  Sensin mV  Impedance:	 1100 Ohms      EBL: 10 cc  Complication: None    IMPRESSION:  Successful RV pacing lead insertion and removal ([place], Dependent).

## 2022-02-18 NOTE — DISCHARGE NOTE PROVIDER - HOSPITAL COURSE
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 Olivia's office for a PPM lead malfunction. He initially presented in July 2021 for dizziness and EKG at that time showed AV block. He had a PPM placed during that hospital visit and was discharged. Day of presentation, he visited his electrophysiologist (Dr. Olivia) and complained of intermittent dizziness for 3 days. At his outpatient appointment he was found to have RV lead malfunction with recurrent noise with V pacing inhibition. The device was reprogrammed to DOO at 80 bpm with RV output at 5.0V and he was sent to the ED.     In the ED, vitals were stable (/76, HR  80, satting 97% on RA). labs were unremarkable, troponin negative.     TTE 2/18: EF 61%. Pt had RV lead removal and replacement with Dr. Olivia on 2/18. Will follow up with Dr. Olivia outpatient.      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 Olivia's office for a PPM lead malfunction. He initially presented in July 2021 for dizziness and EKG at that time showed AV block. He had a PPM placed during that hospital visit and was discharged. Day of presentation, he visited his electrophysiologist (Dr. Olivia) and complained of intermittent dizziness for 3 days. At his outpatient appointment he was found to have RV lead malfunction with recurrent noise with V pacing inhibition. The device was reprogrammed to DOO at 80 bpm with RV output at 5.0V and he was sent to the ED.     In the ED, vitals were stable (/76, HR  80, satting 97% on RA). labs were unremarkable, troponin negative.     TTE 2/18: EF 61%. Pt had RV lead removal and replacement with Dr. Olivia on 2/18. Will follow up with Dr. Olivia outpatient. .

## 2022-02-18 NOTE — PRE-OP CHECKLIST - 1.
# [id number]   [id number] juan, Patient verbalized understanding of procedure.  [id number] juan, Patient verbalized understanding of procedure.

## 2022-02-18 NOTE — PROGRESS NOTE ADULT - SUBJECTIVE AND OBJECTIVE BOX
ABIEL QUINN, 76y, Male; MRN# [id number]  Hospital Stay: 1d.    Patient is a 76y old Male who presents with a chief complaint of Dizziness (17 Feb 2022 19:16)  Currently admitted to the ICU with the primary diagnosis of Pacemaker lead fracture      SUBJECTIVE:  Interval/Overnight events: No overnight events. Pt feeling well    REVIEW OF SYSTEMS:  As per HPI  OBJECTIVE:  VITALS:  T(F): 98 (02-18-22 @ 04:00), Max: 98.7 (02-17-22 @ 17:00)  HR: 80 (02-18-22 @ 05:00) (80 - 81)  BP: 125/66 (02-18-22 @ 05:00) (117/70 - 149/89)  ABP: --  ABP(mean): --  RR: 15 (02-18-22 @ 05:00) (14 - 25)  SpO2: 97% (02-18-22 @ 05:00) (93% - 98%)      I&Os:    02-17-22 @ 07:01  -  02-18-22 @ 06:58  --------------------------------------------------------  IN: 200 mL / OUT: 550 mL / NET: -350 mL      PHYSICAL EXAM:    ACTIVE MEDICATIONS:  Standing  atorvastatin 20 milliGRAM(s) Oral at bedtime  chlorhexidine 4% Liquid 1 Application(s) Topical <User Schedule>  influenza  Vaccine (HIGH DOSE) 0.7 milliLiter(s) IntraMuscular once  oxybutynin 5 milliGRAM(s) Oral every 12 hours  pantoprazole    Tablet 40 milliGRAM(s) Oral before breakfast  pregabalin 75 milliGRAM(s) Oral three times a day  tamsulosin 0.4 milliGRAM(s) Oral at bedtime    PRN    LABS:  02-18-22 @ 04:30  WBC 8.93, H/H 16.6/49.4, plt 138  Na 139, K 4.2, Cl 108, CO2 21, BUN 17, Cr 0.8, Glu 85    Ca 8.4<L>, Mg 2.1, Phosphorus 2.5    Tot Protein 5.9<L>, Alb 3.8, T. Bili 0.4, D. Bili --  AST 22, ALT 21, Alk phos 95    02-17-22 @ 17:15  WBC 10.25, H/H 16.8/50.6, plt 171  Na 136, K 4.9, Cl 104, CO2 22, BUN 19, Cr 0.9, Glu 93    Ca 8.8, Mg 2.1, Phosphorus --    Tot Protein 6.4, Alb 4.0, T. Bili 0.3, D. Bili --  AST 33, ALT 23, Alk phos 107      02-17-22 @ 17:15:  Troponin T <0.01    02-17-22 @ 17:15:  COVID-19 PCR: NotDetec    IMAGING:  < from: Xray Chest 1 View-PORTABLE IMMEDIATE (Xray Chest 1 View-PORTABLE IMMEDIATE .) (02.17.22 @ 21:47) >  Impression:  Left pacemaker appears intact.  No radiographic evidence of acute cardiopulmonary disease.    < end of copied text >      ECHO:  < from: TTE Echo Complete w/o Contrast w/ Doppler (07.06.21 @ 10:13) >  Summary:   1. Normal global left ventricular systolic function.   2. Mildly enlarged left atrium.   3. Normal right atrial size.   4. Trace mitral valve regurgitation.   5. Mild tricuspid regurgitation.   6. Mild aortic regurgitation.    < end of copied text >

## 2022-02-19 ENCOUNTER — TRANSCRIPTION ENCOUNTER (OUTPATIENT)
Age: 77
End: 2022-02-19

## 2022-02-19 VITALS
DIASTOLIC BLOOD PRESSURE: 55 MMHG | RESPIRATION RATE: 25 BRPM | HEART RATE: 82 BPM | SYSTOLIC BLOOD PRESSURE: 114 MMHG | OXYGEN SATURATION: 97 %

## 2022-02-19 LAB
ANION GAP SERPL CALC-SCNC: 9 MMOL/L — SIGNIFICANT CHANGE UP (ref 7–14)
BASOPHILS # BLD AUTO: 0.07 K/UL — SIGNIFICANT CHANGE UP (ref 0–0.2)
BASOPHILS NFR BLD AUTO: 0.7 % — SIGNIFICANT CHANGE UP (ref 0–1)
BUN SERPL-MCNC: 14 MG/DL — SIGNIFICANT CHANGE UP (ref 10–20)
CALCIUM SERPL-MCNC: 8.4 MG/DL — LOW (ref 8.5–10.1)
CHLORIDE SERPL-SCNC: 108 MMOL/L — SIGNIFICANT CHANGE UP (ref 98–110)
CO2 SERPL-SCNC: 23 MMOL/L — SIGNIFICANT CHANGE UP (ref 17–32)
CREAT SERPL-MCNC: 1 MG/DL — SIGNIFICANT CHANGE UP (ref 0.7–1.5)
EOSINOPHIL # BLD AUTO: 0.3 K/UL — SIGNIFICANT CHANGE UP (ref 0–0.7)
EOSINOPHIL NFR BLD AUTO: 2.9 % — SIGNIFICANT CHANGE UP (ref 0–8)
GLUCOSE SERPL-MCNC: 118 MG/DL — HIGH (ref 70–99)
HCT VFR BLD CALC: 50.2 % — SIGNIFICANT CHANGE UP (ref 42–52)
HGB BLD-MCNC: 16.6 G/DL — SIGNIFICANT CHANGE UP (ref 14–18)
IMM GRANULOCYTES NFR BLD AUTO: 0.3 % — SIGNIFICANT CHANGE UP (ref 0.1–0.3)
LYMPHOCYTES # BLD AUTO: 2.2 K/UL — SIGNIFICANT CHANGE UP (ref 1.2–3.4)
LYMPHOCYTES # BLD AUTO: 21.5 % — SIGNIFICANT CHANGE UP (ref 20.5–51.1)
MAGNESIUM SERPL-MCNC: 1.9 MG/DL — SIGNIFICANT CHANGE UP (ref 1.8–2.4)
MCHC RBC-ENTMCNC: 30.5 PG — SIGNIFICANT CHANGE UP (ref 27–31)
MCHC RBC-ENTMCNC: 33.1 G/DL — SIGNIFICANT CHANGE UP (ref 32–37)
MCV RBC AUTO: 92.3 FL — SIGNIFICANT CHANGE UP (ref 80–94)
MONOCYTES # BLD AUTO: 0.74 K/UL — HIGH (ref 0.1–0.6)
MONOCYTES NFR BLD AUTO: 7.2 % — SIGNIFICANT CHANGE UP (ref 1.7–9.3)
NEUTROPHILS # BLD AUTO: 6.88 K/UL — HIGH (ref 1.4–6.5)
NEUTROPHILS NFR BLD AUTO: 67.4 % — SIGNIFICANT CHANGE UP (ref 42.2–75.2)
NRBC # BLD: 0 /100 WBCS — SIGNIFICANT CHANGE UP (ref 0–0)
PLATELET # BLD AUTO: 142 K/UL — SIGNIFICANT CHANGE UP (ref 130–400)
POTASSIUM SERPL-MCNC: 4.3 MMOL/L — SIGNIFICANT CHANGE UP (ref 3.5–5)
POTASSIUM SERPL-SCNC: 4.3 MMOL/L — SIGNIFICANT CHANGE UP (ref 3.5–5)
RBC # BLD: 5.44 M/UL — SIGNIFICANT CHANGE UP (ref 4.7–6.1)
RBC # FLD: 12.9 % — SIGNIFICANT CHANGE UP (ref 11.5–14.5)
SODIUM SERPL-SCNC: 140 MMOL/L — SIGNIFICANT CHANGE UP (ref 135–146)
WBC # BLD: 10.22 K/UL — SIGNIFICANT CHANGE UP (ref 4.8–10.8)
WBC # FLD AUTO: 10.22 K/UL — SIGNIFICANT CHANGE UP (ref 4.8–10.8)

## 2022-02-19 PROCEDURE — 99024 POSTOP FOLLOW-UP VISIT: CPT

## 2022-02-19 PROCEDURE — 99232 SBSQ HOSP IP/OBS MODERATE 35: CPT

## 2022-02-19 PROCEDURE — 93280 PM DEVICE PROGR EVAL DUAL: CPT | Mod: 26

## 2022-02-19 PROCEDURE — 93010 ELECTROCARDIOGRAM REPORT: CPT

## 2022-02-19 PROCEDURE — 93306 TTE W/DOPPLER COMPLETE: CPT | Mod: 26

## 2022-02-19 PROCEDURE — 71046 X-RAY EXAM CHEST 2 VIEWS: CPT | Mod: 26

## 2022-02-19 RX ORDER — CEPHALEXIN 500 MG
1 CAPSULE ORAL
Qty: 10 | Refills: 0
Start: 2022-02-19 | End: 2022-02-23

## 2022-02-19 RX ADMIN — Medication 75 MILLIGRAM(S): at 05:22

## 2022-02-19 RX ADMIN — Medication 75 MILLIGRAM(S): at 13:18

## 2022-02-19 RX ADMIN — Medication 5 MILLIGRAM(S): at 05:22

## 2022-02-19 RX ADMIN — PANTOPRAZOLE SODIUM 40 MILLIGRAM(S): 20 TABLET, DELAYED RELEASE ORAL at 05:23

## 2022-02-19 NOTE — PROGRESS NOTE ADULT - ASSESSMENT
IMPRESSION:    PPM lead malfunction  Hx of symptomatic bradycardia s/p PPM (07/06/2021)  DLD  BPH    PLAN:    CNS: Avoid CNS Depressants     HEENT: Oral care. Aspiration precautions     PULMONARY:   - HOB @ 45. Monitor Pulse Ox. Supplement as needed.    CARDIOVASCULAR:   - f/u TTE  - Atorvastatin 20mg PO QHS   - Daily Weight. Strict I&Os  - PPM lead replacement today    GI:   - GI prophylaxis; pantoprazole      RENAL: Avoid nephrotoxic agents     INFECTIOUS DISEASE: Trend fever curve     HEMATOLOGICAL: DVT prophylaxis; SCD for now     ENDOCRINE: Monitor FS. Insulin protocol if needed     MUSCULOSKELETAL: IAT    CCU monitoring     IMPRESSION:    PPM lead malfunction  Hx of symptomatic bradycardia s/p PPM (07/06/2021)  DLD  BPH    PLAN:    CNS: Avoid CNS Depressants     HEENT: Oral care. Aspiration precautions     PULMONARY:   - HOB @ 45. Monitor Pulse Ox. Supplement as needed.    CARDIOVASCULAR:   - f/u TTE  - Atorvastatin 20mg PO QHS   - Daily Weight. Strict I&Os  - PPM lead replacement yesterday  - Monitor on tele.  - F/u EP recs.    GI:   - GI prophylaxis; pantoprazole      RENAL: Avoid nephrotoxic agents     INFECTIOUS DISEASE: Trend fever curve     HEMATOLOGICAL: DVT prophylaxis; SCD for now     ENDOCRINE: Monitor FS. Insulin protocol if needed     MUSCULOSKELETAL: IAT    CCU monitoring

## 2022-02-19 NOTE — DISCHARGE NOTE NURSING/CASE MANAGEMENT/SOCIAL WORK - PATIENT PORTAL LINK FT
You can access the FollowMyHealth Patient Portal offered by [facility] by registering at the following website: [email or web address]. By joining [facility]’s FollowMyHealth portal, you will also be able to view your health information using other applications (apps) compatible with our system.

## 2022-02-19 NOTE — PROGRESS NOTE ADULT - SUBJECTIVE AND OBJECTIVE BOX
ABIEL QUINN, 76y, Male; MRN# [id number]  Hospital Stay: 1d.    Patient is a 76y old Male who presents with a chief complaint of Dizziness (17 Feb 2022 19:16)  Currently admitted to the ICU with the primary diagnosis of Pacemaker lead fracture      SUBJECTIVE:  Interval/Overnight events: No overnight events. Pt feeling well    REVIEW OF SYSTEMS:  As per HPI  OBJECTIVE:  VITALS:  ICU Vital Signs Last 24 Hrs  T(C): 36.6 (19 Feb 2022 00:00), Max: 36.7 (18 Feb 2022 08:00)  T(F): 97.8 (19 Feb 2022 00:00), Max: 98.1 (18 Feb 2022 08:00)  HR: 97 (19 Feb 2022 06:00) (59 - 100)  BP: 94/52 (19 Feb 2022 06:00) (82/45 - 133/75)  BP(mean): 70 (19 Feb 2022 06:00) (60 - 89)  ABP: --  ABP(mean): --  RR: 17 (19 Feb 2022 06:00) (16 - 28)  SpO2: 95% (19 Feb 2022 06:00) (94% - 99%)      I&Os:    18 Feb 2022 07:01  -  19 Feb 2022 07:00  --------------------------------------------------------  IN: 200 mL / OUT: 400 mL / NET: -200 mL      PHYSICAL EXAM:  CONSTITUTIONAL: AAOx3  HEAD: Atraumatic, normocephalic  EYES: EOM intact  ENT: Supple, moist mucous membranes  PULMONARY: Dec BS  CARDIOVASCULAR: Regular rate and rhythm; paced  GASTROINTESTINAL: Soft, non-tender, non-distended  NEUROLOGY: Moves all extremities      ACTIVE MEDICATIONS:  MEDICATIONS  (STANDING):  atorvastatin 20 milliGRAM(s) Oral at bedtime  influenza  Vaccine (HIGH DOSE) 0.7 milliLiter(s) IntraMuscular once  oxybutynin 5 milliGRAM(s) Oral every 12 hours  pantoprazole    Tablet 40 milliGRAM(s) Oral before breakfast  pregabalin 75 milliGRAM(s) Oral three times a day  tamsulosin 0.4 milliGRAM(s) Oral at bedtime    MEDICATIONS  (PRN):      LABS:                        16.6   10.22 )-----------( 142      ( 19 Feb 2022 04:59 )             50.2     02-19    140  |  108  |  14  ----------------------------<  118<H>  4.3   |  23  |  1.0    Ca    8.4<L>      19 Feb 2022 04:59  Phos  2.5     02-18  Mg     1.9     02-19    TPro  5.9<L>  /  Alb  3.8  /  TBili  0.4  /  DBili  x   /  AST  22  /  ALT  21  /  AlkPhos  95  02-18        02-18-22 @ 04:30  WBC 8.93, H/H 16.6/49.4, plt 138  Na 139, K 4.2, Cl 108, CO2 21, BUN 17, Cr 0.8, Glu 85    Ca 8.4<L>, Mg 2.1, Phosphorus 2.5    Tot Protein 5.9<L>, Alb 3.8, T. Bili 0.4, D. Bili --  AST 22, ALT 21, Alk phos 95    02-17-22 @ 17:15  WBC 10.25, H/H 16.8/50.6, plt 171  Na 136, K 4.9, Cl 104, CO2 22, BUN 19, Cr 0.9, Glu 93    Ca 8.8, Mg 2.1, Phosphorus --    Tot Protein 6.4, Alb 4.0, T. Bili 0.3, D. Bili --  AST 33, ALT 23, Alk phos 107      02-17-22 @ 17:15:  Troponin T <0.01    02-17-22 @ 17:15:  COVID-19 PCR: NotDetec      IMAGING:  Xray Chest 1 View- PORTABLE-Urgent (Xray Chest 1 View- PORTABLE-Urgent .) (02.18.22 @ 07:02)   Impression:  Question of small bilateral pleural effusions.      Xray Chest 1 View-PORTABLE IMMEDIATE (Xray Chest 1 View-PORTABLE IMMEDIATE .) (02.17.22 @ 21:47)   Impression:  Left pacemaker appears intact.  No radiographic evidence of acute cardiopulmonary disease.        ECHO:   TTE Echo Complete w/o Contrast w/ Doppler (02.18.22 @ 07:25)   Summary:   1. Normal global left ventricular systolic function.   2. LV Ejection Fraction by Muñoz's Method with a biplane EF of 61 %.   3. Normal left ventricular internal cavity size.   4. Spectral Doppler shows impaired relaxation pattern of left   ventricular myocardial filling (Grade I diastolic dysfunction).   5. Normal left atrial size.   6. Normal right atrial size.   7. Mild-moderate tricuspid regurgitation.   8. Mild aortic regurgitation.       TTE Echo Complete w/o Contrast w/ Doppler (07.06.21 @ 10:13) >  Summary:   1. Normal global left ventricular systolic function.   2. Mildly enlarged left atrium.   3. Normal right atrial size.   4. Trace mitral valve regurgitation.   5. Mild tricuspid regurgitation.   6. Mild aortic regurgitation.   Patient is a 76y old Male who presents with a chief complaint of Dizziness (17 Feb 2022 19:16)  Currently admitted to the ICU with the primary diagnosis of Pacemaker lead fracture    SUBJECTIVE:  Interval/Overnight events: No overnight events. Pt feeling well    REVIEW OF SYSTEMS:  As per HPI  OBJECTIVE:  VITALS:  ICU Vital Signs Last 24 Hrs  T(C): 36.6 (19 Feb 2022 00:00), Max: 36.7 (18 Feb 2022 08:00)  T(F): 97.8 (19 Feb 2022 00:00), Max: 98.1 (18 Feb 2022 08:00)  HR: 97 (19 Feb 2022 06:00) (59 - 100)  BP: 94/52 (19 Feb 2022 06:00) (82/45 - 133/75)  BP(mean): 70 (19 Feb 2022 06:00) (60 - 89)  ABP: --  ABP(mean): --  RR: 17 (19 Feb 2022 06:00) (16 - 28)  SpO2: 95% (19 Feb 2022 06:00) (94% - 99%)      I&Os:    18 Feb 2022 07:01  -  19 Feb 2022 07:00  --------------------------------------------------------  IN: 200 mL / OUT: 400 mL / NET: -200 mL      PHYSICAL EXAM:  CONSTITUTIONAL: AAOx3  HEAD: Atraumatic, normocephalic  EYES: EOM intact  ENT: Supple, moist mucous membranes  PULMONARY: Dec BS  CARDIOVASCULAR: Regular rate and rhythm; paced  GASTROINTESTINAL: Soft, non-tender, non-distended  NEUROLOGY: Moves all extremities      ACTIVE MEDICATIONS:  MEDICATIONS  (STANDING):  atorvastatin 20 milliGRAM(s) Oral at bedtime  influenza  Vaccine (HIGH DOSE) 0.7 milliLiter(s) IntraMuscular once  oxybutynin 5 milliGRAM(s) Oral every 12 hours  pantoprazole    Tablet 40 milliGRAM(s) Oral before breakfast  pregabalin 75 milliGRAM(s) Oral three times a day  tamsulosin 0.4 milliGRAM(s) Oral at bedtime    MEDICATIONS  (PRN):      LABS:                        16.6   10.22 )-----------( 142      ( 19 Feb 2022 04:59 )             50.2     02-19    140  |  108  |  14  ----------------------------<  118<H>  4.3   |  23  |  1.0    Ca    8.4<L>      19 Feb 2022 04:59  Phos  2.5     02-18  Mg     1.9     02-19    TPro  5.9<L>  /  Alb  3.8  /  TBili  0.4  /  DBili  x   /  AST  22  /  ALT  21  /  AlkPhos  95  02-18 02-18-22 @ 04:30  WBC 8.93, H/H 16.6/49.4, plt 138  Na 139, K 4.2, Cl 108, CO2 21, BUN 17, Cr 0.8, Glu 85    Ca 8.4<L>, Mg 2.1, Phosphorus 2.5    Tot Protein 5.9<L>, Alb 3.8, T. Bili 0.4, D. Bili --  AST 22, ALT 21, Alk phos 95    02-17-22 @ 17:15  WBC 10.25, H/H 16.8/50.6, plt 171  Na 136, K 4.9, Cl 104, CO2 22, BUN 19, Cr 0.9, Glu 93    Ca 8.8, Mg 2.1, Phosphorus --    Tot Protein 6.4, Alb 4.0, T. Bili 0.3, D. Bili --  AST 33, ALT 23, Alk phos 107      02-17-22 @ 17:15:  Troponin T <0.01    02-17-22 @ 17:15:  COVID-19 PCR: NotDetec      IMAGING:  Xray Chest 1 View- PORTABLE-Urgent (Xray Chest 1 View- PORTABLE-Urgent .) (02.18.22 @ 07:02)   Impression:  Question of small bilateral pleural effusions.      Xray Chest 1 View-PORTABLE IMMEDIATE (Xray Chest 1 View-PORTABLE IMMEDIATE .) (02.17.22 @ 21:47)   Impression:  Left pacemaker appears intact.  No radiographic evidence of acute cardiopulmonary disease.        ECHO:   TTE Echo Complete w/o Contrast w/ Doppler (02.18.22 @ 07:25)   Summary:   1. Normal global left ventricular systolic function.   2. LV Ejection Fraction by Muñoz's Method with a biplane EF of 61 %.   3. Normal left ventricular internal cavity size.   4. Spectral Doppler shows impaired relaxation pattern of left   ventricular myocardial filling (Grade I diastolic dysfunction).   5. Normal left atrial size.   6. Normal right atrial size.   7. Mild-moderate tricuspid regurgitation.   8. Mild aortic regurgitation.       TTE Echo Complete w/o Contrast w/ Doppler (07.06.21 @ 10:13) >  Summary:   1. Normal global left ventricular systolic function.   2. Mildly enlarged left atrium.   3. Normal right atrial size.   4. Trace mitral valve regurgitation.   5. Mild tricuspid regurgitation.   6. Mild aortic regurgitation.

## 2022-02-19 NOTE — DISCHARGE NOTE NURSING/CASE MANAGEMENT/SOCIAL WORK - NSDCPEFALRISK_GEN_ALL_CORE
For information on Fall & Injury Prevention, visit: [email or web address]  [email or web address] OR  https://www.cdc.gov/steadi/patient.html

## 2022-02-19 NOTE — PROGRESS NOTE ADULT - ASSESSMENT
EP Dr Emelyn Olivia     77 YO M with PMHx of HLD, BPH, symptomatic bradycardia, CHB s/p PPM (07/06/2021) pacemaker dependent, noted abnormalities on remote transmission suggestive of lead malfunction, confirmed in the office and was sent to ED  Patient s/p RV lead revision DC  PPM implant    - CXR as above  - Device interrogation done, awaiting review by attending    - Keflex 500 mg PO q12 h x 5 days  - FU in the EP office for wound check with ___NP in 1 weeks  Dr Olivia office   [address] [place] [address], [address]  [phone]       No Heavy lifting >5 lbs. Do not raise your arm above shoulder level for 4-6 weeks.   No driving for 2weeks  No shower, bathtub, no wetting device site until follow up visit  Do NOT remove dressing until follow up visit, leave Steri-strips in place     EP Dr Emelyn Olivia     75 YO M with PMHx of HLD, BPH, symptomatic bradycardia, CHB s/p PPM (07/06/2021) pacemaker dependent, noted abnormalities on remote transmission suggestive of lead malfunction, confirmed in the office and was sent to ED  Patient s/p RV lead revision DC  PPM implant    - CXR within normal   - Device interrogation done, awaiting review by attending    - Keflex 500 mg PO q12 h x 5 days  - FU in the EP office for wound check with ___NP in 1 weeks  Dr Olivia office   [address] [place] [address], [address]  [phone]       No Heavy lifting >5 lbs. Do not raise your arm above shoulder level for 4-6 weeks.   No driving for 2weeks  No shower, bathtub, no wetting device site until follow up visit  Do NOT remove dressing until follow up visit, leave Steri-strips in place

## 2022-02-19 NOTE — PROGRESS NOTE ADULT - SUBJECTIVE AND OBJECTIVE BOX
INTERVAL HPI/OVERNIGHT EVENTS:    Patient s/p RV lead revision DC PPM implant  No event over night. Pt without complains    MEDICATIONS  (STANDING):  atorvastatin 20 milliGRAM(s) Oral at bedtime  influenza  Vaccine (HIGH DOSE) 0.7 milliLiter(s) IntraMuscular once  oxybutynin 5 milliGRAM(s) Oral every 12 hours  pantoprazole    Tablet 40 milliGRAM(s) Oral before breakfast  pregabalin 75 milliGRAM(s) Oral three times a day  tamsulosin 0.4 milliGRAM(s) Oral at bedtime    MEDICATIONS  (PRN):      Allergies    No Known Allergies    Intolerances          Vital Signs Last 24 Hrs  T(C): 36.5 (19 Feb 2022 08:00), Max: 36.6 (18 Feb 2022 15:00)  T(F): 97.7 (19 Feb 2022 08:00), Max: 97.9 (18 Feb 2022 15:00)  HR: 98 (19 Feb 2022 08:00) (59 - 100)  BP: 103/56 (19 Feb 2022 08:00) (82/45 - 133/75)  BP(mean): 73 (19 Feb 2022 08:00) (60 - 89)  RR: 28 (19 Feb 2022 08:00) (17 - 28)  SpO2: 96% (19 Feb 2022 08:00) (94% - 99%)    GENERAL: In no apparent distress, well nourished, and hydrated.  HEART: Regular rate and rhythm; No murmurs, rubs, or gallops.  	Wound healing well; No hematoma; no bleeding  PULMONARY: Clear to auscultation and perfusion.  No rales, wheezing, or rhonchi bilaterally.  ABDOMEN: Soft, Nontender, Nondistended; Bowel sounds present  EXTREMITIES:  2+ Peripheral Pulses, No clubbing, cyanosis, or edema  NEUROLOGICAL: Grossly nonfocal        RADIOLOGY & ADDITIONAL TESTS:  CXR P+L leads in appropriate positions, no pneumo appreciated        A/P   Patient s/p RV DC  PPM implant    - CXR as above  - Device interrogation done, awaiting review by attending    - Keflex 500 mg PO q12 h x 5 days  - FU in the EP office for wound check with ___NP in 1 weeks  Dr Olivia office   [address] [place] [address], [address]  [phone]       No Heavy lifting >5 lbs. Do not raise your arm above shoulder level for 4-6 weeks.   No driving for 2weeks  No shower, bathtub, no wetting device site until follow up visit  Do NOT remove dressing until follow up visit, leave Steri-strips in place

## 2022-02-19 NOTE — PROGRESS NOTE ADULT - ATTENDING COMMENTS
Covering Dr. Olivia  s/p pacemaker lead revision   stable for discharge
76 year old man with hx of symptomatic caroline s/p PPM, sent from EP office for lead malfunctioning, patient was feeling dizzy, device interrogation showed av blocks, reprogramed to DOO, and admitted to CCU for monitoring, plan for lead revision/replacement 2/18  currently hemodynamically stable, monitor showed AV pacing rhythm.  -TTE reviewed normal EF.  -Maintain electrolytes within normal ranges  2/18 plan for EP intervention today    2/19 s/p lead replacement yesterday, patient is comfortable, hemodynamically stable, Echo nl EF no effusion.   CXR reviewed, Plan to dc home today , fup dc rec as by EP
76 year old man with hx of symptomatic caroline s/p PPM, sent from EP office for lead malfunctioning, patient was feeling dizzy, device interrogation showed av blocks, reprogramed to DOO, and admitted to CCU for monitoring, plan for lead revision/replacement 2/18  currently hemodynamically stable, monitor showed AV pacing rhythm.  -TTE reviewed normal EF.  -Maintain electrolytes within normal ranges  plan for EP intervention today

## 2022-02-24 ENCOUNTER — OUTPATIENT (OUTPATIENT)
Dept: OUTPATIENT SERVICES | Facility: HOSPITAL | Age: 77
LOS: 1 days | Discharge: HOME | End: 2022-02-24
Payer: MEDICAID

## 2022-02-24 ENCOUNTER — APPOINTMENT (OUTPATIENT)
Dept: OPHTHALMOLOGY | Facility: CLINIC | Age: 77
End: 2022-02-24

## 2022-02-24 DIAGNOSIS — Z87.828 PERSONAL HISTORY OF OTHER (HEALED) PHYSICAL INJURY AND TRAUMA: Chronic | ICD-10-CM

## 2022-02-24 PROCEDURE — ZZZZZ: CPT

## 2022-02-26 DIAGNOSIS — H02.20C: ICD-10-CM

## 2022-02-26 DIAGNOSIS — E78.5 HYPERLIPIDEMIA, UNSPECIFIED: ICD-10-CM
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
## 2022-02-26 DIAGNOSIS — H16.149 PUNCTATE KERATITIS, UNSPECIFIED EYE: ICD-10-CM

## 2022-02-26 DIAGNOSIS — H35.039 HYPERTENSIVE RETINOPATHY, UNSPECIFIED EYE: ICD-10-CM

## 2022-02-26 DIAGNOSIS — T82.110A BREAKDOWN (MECHANICAL) OF CARDIAC ELECTRODE, INITIAL ENCOUNTER: ICD-10-CM

## 2022-02-26 DIAGNOSIS — N40.0 BENIGN PROSTATIC HYPERPLASIA WITHOUT LOWER URINARY TRACT SYMPTOMS: ICD-10-CM

## 2022-02-26 DIAGNOSIS — I44.2 ATRIOVENTRICULAR BLOCK, COMPLETE: ICD-10-CM

## 2022-03-09 ENCOUNTER — APPOINTMENT (OUTPATIENT)
Dept: CARDIOLOGY | Facility: CLINIC | Age: 77
End: 2022-03-09
Payer: SELF-PAY

## 2022-03-09 VITALS
TEMPERATURE: 97 F | BODY MASS INDEX: 31.24 KG/M2 | SYSTOLIC BLOOD PRESSURE: 130 MMHG | HEART RATE: 70 BPM | WEIGHT: 183 LBS | DIASTOLIC BLOOD PRESSURE: 84 MMHG | HEIGHT: 64 IN | OXYGEN SATURATION: 99 %

## 2022-03-09 PROCEDURE — 93000 ELECTROCARDIOGRAM COMPLETE: CPT | Mod: 59

## 2022-03-09 PROCEDURE — 99024 POSTOP FOLLOW-UP VISIT: CPT

## 2022-03-09 PROCEDURE — 93280 PM DEVICE PROGR EVAL DUAL: CPT

## 2022-03-23 NOTE — HISTORY OF PRESENT ILLNESS
[de-identified] : The patient noted the following symptom(s):. 77 y/o male who was admitted to [facility] for symptomatic bradycardia/ 2:1 AVB underwent a DC PPM on 7/6/2021 \par Returns for urgent follow up. \par \par Accompanied by son, who has assisted with translation. Patient speaks Slovak. \par \par Patient reports chest pain and dizziness for the past 3 days. Denies LOC. \par \par 3/9/22: Lead insulation failure- CHB s/p lead removal and new lead insertion. Feels great.\par \par Denies chest pain, shortness of breath, palpitation, dizziness or LOC except noted above.\par \par \par Cardiac tests: \par EKG (3/9/22): SR @@70\par ECG (2/17/2022): SR,  paced \par ECG( 10/13/2021); SR at 79 bpm, V paced \par ECG (7/13/2021): NSR at 79 BPM, V paced. \par ECHO (7/6/2021): Normal LVF, trace MR, Mild TR/AR

## 2022-03-23 NOTE — PHYSICAL EXAM
[General Appearance - Well Developed] : well developed [Normal Appearance] : normal appearance [Well Groomed] : well groomed [General Appearance - Well Nourished] : well nourished [General Appearance - In No Acute Distress] : no acute distress [No Deformities] : no deformities [Heart Rate And Rhythm] : heart rate and rhythm were normal [Heart Sounds] : normal S1 and S2 [Murmurs] : no murmurs present [Respiration, Rhythm And Depth] : normal respiratory rhythm and effort [Exaggerated Use Of Accessory Muscles For Inspiration] : no accessory muscle use [Auscultation Breath Sounds / Voice Sounds] : lungs were clear to auscultation bilaterally [Clean] : clean [Dry] : dry [Well-Healed] : well-healed [Abdomen Soft] : soft [Abdomen Tenderness] : non-tender [Abdomen Mass (___ Cm)] : no abdominal mass palpated [Nail Clubbing] : no clubbing of the fingernails [Cyanosis, Localized] : no localized cyanosis [Petechial Hemorrhages (___cm)] : no petechial hemorrhages [] : no ischemic changes

## 2022-03-23 NOTE — ASSESSMENT
[FreeTextEntry1] : ## Complete heart block s/p DC-PPM (SJM, 22, Dep)\par \par - PPM interrogation shows normally functioning DC-PPM. Battery life ok. No new events.\par - Remote Monitoring\par - Return in 3 months

## 2022-03-23 NOTE — PROCEDURE
[Complete Heart Block] : complete heart block [Pacemaker] : pacemaker [DDDR] : DDDR [Voltage: ___ volts] : Voltage was [unfilled] volts [Magnet Rate: ___ Ppm] : magnet rate was [unfilled] Ppm [Longevity: ___ months] : The estimated remaining battery life is [unfilled] months [Threshold Testing Performed] : Threshold testing was performed [Lead Imp:  ___ohms] : lead impedance was [unfilled] ohms [Sensing Amplitude ___mv] : sensing amplitude was [unfilled] mv [___V @] : [unfilled] V [___ ms] : [unfilled] ms [Programmed for Longevity] : output reprogrammed for improved battery longevity [Apace-Vpace ___ %] : Apace-Vpace [unfilled]% [de-identified] : [facility]  [de-identified] : [id number] [de-identified] : [id number] [de-identified] : 7/6/2021 [de-identified] : 54 [de-identified] : 1 AMS episode 6 secs

## 2022-04-01 ENCOUNTER — APPOINTMENT (OUTPATIENT)
Dept: INTERNAL MEDICINE | Facility: CLINIC | Age: 77
End: 2022-04-01
Payer: MEDICAID

## 2022-04-01 ENCOUNTER — OUTPATIENT (OUTPATIENT)
Dept: OUTPATIENT SERVICES | Facility: HOSPITAL | Age: 77
LOS: 1 days | Discharge: HOME | End: 2022-04-01

## 2022-04-01 VITALS
TEMPERATURE: 97.2 F | DIASTOLIC BLOOD PRESSURE: 87 MMHG | BODY MASS INDEX: 32.44 KG/M2 | SYSTOLIC BLOOD PRESSURE: 126 MMHG | HEIGHT: 64 IN | HEART RATE: 80 BPM | WEIGHT: 190 LBS | OXYGEN SATURATION: 96 %

## 2022-04-01 DIAGNOSIS — F32.A DEPRESSION, UNSPECIFIED: ICD-10-CM

## 2022-04-01 DIAGNOSIS — H92.01 OTALGIA, RIGHT EAR: ICD-10-CM

## 2022-04-01 DIAGNOSIS — H91.8X1 OTHER SPECIFIED HEARING LOSS, RIGHT EAR: ICD-10-CM

## 2022-04-01 DIAGNOSIS — Z87.828 PERSONAL HISTORY OF OTHER (HEALED) PHYSICAL INJURY AND TRAUMA: Chronic | ICD-10-CM

## 2022-04-01 DIAGNOSIS — E78.5 HYPERLIPIDEMIA, UNSPECIFIED: ICD-10-CM

## 2022-04-01 DIAGNOSIS — Z95.0 PRESENCE OF CARDIAC PACEMAKER: ICD-10-CM

## 2022-04-01 DIAGNOSIS — Z23 ENCOUNTER FOR IMMUNIZATION: ICD-10-CM

## 2022-04-01 DIAGNOSIS — H72.90 UNSPECIFIED PERFORATION OF TYMPANIC MEMBRANE, UNSPECIFIED EAR: ICD-10-CM

## 2022-04-01 DIAGNOSIS — Z00.00 ENCOUNTER FOR GENERAL ADULT MEDICAL EXAMINATION W/OUT ABNORMAL FINDINGS: ICD-10-CM

## 2022-04-01 DIAGNOSIS — M75.00 ADHESIVE CAPSULITIS OF UNSPECIFIED SHOULDER: ICD-10-CM

## 2022-04-01 PROCEDURE — 99214 OFFICE O/P EST MOD 30 MIN: CPT

## 2022-04-01 RX ORDER — DOCUSATE SODIUM 100 MG/1
100 CAPSULE, LIQUID FILLED ORAL TWICE DAILY
Qty: 60 | Refills: 3 | Status: DISCONTINUED | COMMUNITY
End: 2022-04-01

## 2022-04-01 RX ORDER — CELECOXIB 200 MG/1
200 CAPSULE ORAL DAILY
Qty: 30 | Refills: 1 | Status: DISCONTINUED | COMMUNITY
Start: 2020-07-20 | End: 2022-04-01

## 2022-04-01 RX ORDER — LIDOCAINE AND PRILOCAINE 25; 25 MG/G; MG/G
2.5-2.5 CREAM TOPICAL
Qty: 1 | Refills: 3 | Status: DISCONTINUED | COMMUNITY
Start: 2017-09-25 | End: 2022-04-01

## 2022-04-12 DIAGNOSIS — H91.8X1 OTHER SPECIFIED HEARING LOSS, RIGHT EAR: ICD-10-CM

## 2022-04-12 DIAGNOSIS — Z95.0 PRESENCE OF CARDIAC PACEMAKER: ICD-10-CM

## 2022-04-12 DIAGNOSIS — E78.5 HYPERLIPIDEMIA, UNSPECIFIED: ICD-10-CM

## 2022-04-12 DIAGNOSIS — H72.90 UNSPECIFIED PERFORATION OF TYMPANIC MEMBRANE, UNSPECIFIED EAR: ICD-10-CM

## 2022-04-12 DIAGNOSIS — M19.90 UNSPECIFIED OSTEOARTHRITIS, UNSPECIFIED SITE: ICD-10-CM

## 2022-04-12 DIAGNOSIS — Z00.00 ENCOUNTER FOR GENERAL ADULT MEDICAL EXAMINATION WITHOUT ABNORMAL FINDINGS: ICD-10-CM

## 2022-04-12 DIAGNOSIS — I44.39 OTHER ATRIOVENTRICULAR BLOCK: ICD-10-CM

## 2022-04-12 DIAGNOSIS — R32 UNSPECIFIED URINARY INCONTINENCE: ICD-10-CM

## 2022-04-13 PROBLEM — H91.8X1: Status: ACTIVE | Noted: 2022-04-01

## 2022-04-13 PROBLEM — H92.01 RIGHT EAR PAIN: Status: ACTIVE | Noted: 2022-04-13

## 2022-04-13 PROBLEM — Z00.00 HEALTH CARE MAINTENANCE: Status: ACTIVE | Noted: 2019-09-11

## 2022-04-21 ENCOUNTER — APPOINTMENT (OUTPATIENT)
Dept: OTOLARYNGOLOGY | Facility: CLINIC | Age: 77
End: 2022-04-21
Payer: MEDICAID

## 2022-04-21 DIAGNOSIS — H90.3 SENSORINEURAL HEARING LOSS, BILATERAL: ICD-10-CM

## 2022-04-21 PROCEDURE — 92557 COMPREHENSIVE HEARING TEST: CPT

## 2022-04-21 PROCEDURE — 99204 OFFICE O/P NEW MOD 45 MIN: CPT

## 2022-04-21 NOTE — DATA REVIEWED
[de-identified] : Audiogram\par Right: Moderate severe to profound SNHL, type As tymp CNT speech\par Left: Moderate to severe SNHL, type A tymp\par \par Asymmetric\par

## 2022-04-21 NOTE — ASSESSMENT
[FreeTextEntry1] : Right ear debrided, likely fungal AOE, boric acid applied, no sign of perforation\par ASNHL on audio\par Will get MRI\par RV for results

## 2022-04-21 NOTE — HISTORY OF PRESENT ILLNESS
[de-identified] : Patient presents today with c/o right sided hearing loss and tinnitus. Present for "a long time" according to son of patient. \par \par Patient states pain in the right ear and leads to headaches. Unable to sleep at night due to the pain and headaches. Has constant tinnitus in the ear. No h/o ear infections in the past. No ear surgeries. He admits working with loud machinery in the past. Son states when washing dishes complains of loud noise. States he had drainage from right ear two months ago and was treated by urgi care with drops (does not recall name) Denies otorrhea or dizziness at this time.

## 2022-04-21 NOTE — PHYSICAL EXAM
[de-identified] : debris removed from right EAC with microinstruments.  [Midline] : trachea located in midline position [Normal] : the left mastoid was normal [Hearing Loss Right Only] : normal [Hearing Loss Left Only] : normal [FreeTextEntry8] : Old damp debris removed with microinstruments, boric acid powder applied

## 2022-04-22 ENCOUNTER — APPOINTMENT (OUTPATIENT)
Dept: INTERNAL MEDICINE | Facility: CLINIC | Age: 77
End: 2022-04-22
Payer: MEDICAID

## 2022-04-22 ENCOUNTER — OUTPATIENT (OUTPATIENT)
Dept: OUTPATIENT SERVICES | Facility: HOSPITAL | Age: 77
LOS: 1 days | Discharge: HOME | End: 2022-04-22

## 2022-04-22 VITALS
DIASTOLIC BLOOD PRESSURE: 83 MMHG | BODY MASS INDEX: 32.44 KG/M2 | WEIGHT: 190 LBS | HEART RATE: 75 BPM | HEIGHT: 64 IN | SYSTOLIC BLOOD PRESSURE: 144 MMHG | TEMPERATURE: 98 F | OXYGEN SATURATION: 97 %

## 2022-04-22 DIAGNOSIS — I44.39 OTHER ATRIOVENTRICULAR BLOCK: ICD-10-CM

## 2022-04-22 DIAGNOSIS — Z87.828 PERSONAL HISTORY OF OTHER (HEALED) PHYSICAL INJURY AND TRAUMA: Chronic | ICD-10-CM

## 2022-04-22 DIAGNOSIS — Z00.00 ENCOUNTER FOR GENERAL ADULT MEDICAL EXAMINATION W/OUT ABNORMAL FINDINGS: ICD-10-CM

## 2022-04-22 DIAGNOSIS — M19.90 UNSPECIFIED OSTEOARTHRITIS, UNSPECIFIED SITE: ICD-10-CM

## 2022-04-22 DIAGNOSIS — E55.9 VITAMIN D DEFICIENCY, UNSPECIFIED: ICD-10-CM

## 2022-04-22 PROCEDURE — 99214 OFFICE O/P EST MOD 30 MIN: CPT | Mod: GC

## 2022-04-22 RX ORDER — INFLUENZA A VIRUS A/VICTORIA/4897/2022 IVR-238 (H1N1) ANTIGEN (FORMALDEHYDE INACTIVATED), INFLUENZA A VIRUS A/DARWIN/9/2021 SAN-010 (H3N2) ANTIGEN (FORMALDEHYDE INACTIVATED), INFLUENZA B VIRUS B/PHUKET/3073/2013 ANTIGEN (FORMALDEHYDE INACTIVATED), AND INFLUENZA B VIRUS B/MICHIGAN/01/2021 ANTIGEN (FORMALDEHYDE INACTIVATED) 60; 60; 60; 60 UG/.7ML; UG/.7ML; UG/.7ML; UG/.7ML
0.7 INJECTION, SUSPENSION INTRAMUSCULAR
Qty: 1 | Refills: 0 | Status: DISCONTINUED | COMMUNITY
Start: 2022-04-01 | End: 2022-04-22

## 2022-04-22 RX ORDER — PHENOL 1.4 %
600-400 AEROSOL, SPRAY (ML) MUCOUS MEMBRANE
Qty: 60 | Refills: 5 | Status: ACTIVE | COMMUNITY
Start: 2018-01-25 | End: 1900-01-01

## 2022-04-25 PROBLEM — Z95.0 S/P PLACEMENT OF CARDIAC PACEMAKER: Status: ACTIVE | Noted: 2021-07-13

## 2022-04-25 PROBLEM — H72.90 RUPTURED TYMPANIC MEMBRANE: Status: ACTIVE | Noted: 2022-04-01

## 2022-04-25 NOTE — PHYSICAL EXAM
[No Acute Distress] : no acute distress [Well Nourished] : well nourished [Well Developed] : well developed [Well-Appearing] : well-appearing [Normal Voice/Communication] : normal voice/communication [Normal Sclera/Conjunctiva] : normal sclera/conjunctiva [EOMI] : extraocular movements intact [Normal Outer Ear/Nose] : the outer ears and nose were normal in appearance [Normal Oropharynx] : the oropharynx was normal [No Respiratory Distress] : no respiratory distress  [No Accessory Muscle Use] : no accessory muscle use [Clear to Auscultation] : lungs were clear to auscultation bilaterally [Normal Rate] : normal rate  [Regular Rhythm] : with a regular rhythm [Normal S1, S2] : normal S1 and S2 [No Murmur] : no murmur heard [Non Tender] : non-tender [Normal Bowel Sounds] : normal bowel sounds [Speech Grossly Normal] : speech grossly normal [Normal Affect] : the affect was normal [Normal Mood] : the mood was normal [de-identified] : R sided ruptured tympanic membrane w/ mild ear wax noted. L tympanic membrane normal, minimal ear wax, good light reflex.  [de-identified] : + B/L LE edema 2+ [de-identified] : obese [de-identified] : alert and awake [de-identified] : a

## 2022-04-25 NOTE — REVIEW OF SYSTEMS
[Earache] : earache [Hearing Loss] : hearing loss [Lower Ext Edema] : lower extremity edema [Incontinence] : incontinence [Frequency] : frequency [Headache] : headache [Fever] : no fever [Chills] : no chills [Night Sweats] : no night sweats [Recent Change In Weight] : ~T no recent weight change [Vision Problems] : no vision problems [Nasal Discharge] : no nasal discharge [Sore Throat] : no sore throat [Chest Pain] : no chest pain [Palpitations] : no palpitations [Orthopena] : no orthopnea [Shortness Of Breath] : no shortness of breath [Wheezing] : no wheezing [Cough] : no cough [Abdominal Pain] : no abdominal pain [Nausea] : no nausea [Constipation] : no constipation [Diarrhea] : no diarrhea [Vomiting] : no vomiting [Melena] : no melena [Dysuria] : no dysuria [Hematuria] : no hematuria [Joint Pain] : no joint pain [Skin Rash] : no skin rash [Dizziness] : no dizziness [FreeTextEntry4] : R earache w/ associated headache [FreeTextEntry7] : stools 2-3 a day, no diarrhea, no hematochezia [FreeTextEntry8] : urinary frequency through the day

## 2022-04-25 NOTE — HISTORY OF PRESENT ILLNESS
[Family Member] : family member [FreeTextEntry1] : -Establish new PCP \par - c/o RT ear pain\par - LE swelling [de-identified] : 77 y/o M w/ PMHx Urinary Incontinence, Osteoarthritis, Chronic Back Pain, Insomnia, Dyslipidemia, and Complete Heart Block s/p pacemaker [id number], and obesity w/ subsequent lead replacement [id number] due to lead insulation failure) presents to establish care. Pt accompanied by his daughter in law, preferred to translate (Slovenian). Pt complaining of right sided ear aches occurring several times a week associated with R sided headaches and worsening of hearing when sxs present. Pt has right sided hearing aid. Also complaining of lower extremitiy swelling (recently started lasix 40mg qd with some improvement in sxs). Also notes continued urinary urge incontinence x 7 months where pt suddenly experiences the urge to urinary at times as much as twice hourly, no difficulty initiating urine and occasionally pt cannot make it to the toilet in time, no urinary incontinence with cough/straining.

## 2022-04-25 NOTE — PLAN
[FreeTextEntry1] : 75 y/o M w/ PMHx Urinary Incontinence, Osteoarthritis, Chronic Back Pain, Insomnia, Dyslipidemia, and Complete Heart Block s/p pacemaker [id number], and obesity w/ subsequent lead replacement Feb2022 due to lead insulation failure) presents to establish care. Pt accompanied by his daughter in law, preferred to translate (Polish). Pt complaining of right sided ear aches occurring several times a week associated with R sided headaches and worsening of hearing when sxs present. Pt has right sided hearing aid. Also complaining of lower extremitiy swelling (recently started lasix 40mg qd with some improvement in sxs). Also notes continued urinary urge incontinence x 7 months where pt suddenly experiences the urge to urinary at times as much as twice hourly, no difficulty initiating urine and occasionally pt cannot make it to the toilet in time, no urinary incontinence with cough/straining. \par \par \par #Right Ear pain w/ R sided headaches x 7 months, occurs several times a week\par #Right sided hearing loss, R sided hearing aid x 1 year\par -start about 7 months ago, pt has right sided hearing aid\par -? R sided tympanic membrane rupture w/ some ear wax noted, no erythema or purulence noted, dry\par -L tympanic membrane intact, minimal ear wax, good light reflex\par -pt also experiences decreased R sided despite hearing aid when experiencing ear pain and headache\par -ENT referral\par -audiology referral\par \par #Urge Urinary Incontinence\par -no incontinence when coughing or straining\par -no issue initiating urinary stream\par -sudden urge to urine\par -no dysuria\par -pt on lasix 40mg qd\par -Send U/A and UCx\par - on oxybutynin\par -urology follow up (Dr. Donohue)\par \par #LE Edema, pitting\par -Echo Feb2022:  EF 60%, G1DD, mild AVR and mild TVR\par - on lasix no SOB, follow for now\par \par -on lasix 40mg qd (started one month ago) - refilled Rx\par -sending CMP (LFTs), PTT, PT, INR r/o cirrhosis\par \par #CHB s/p PPM for heart block in Jul2021 (w/ lead replacement Feb2022)\par -no SOB, dizziness, palpations, chest pain since lead replacement\par \par -regular cardio follow up\par \par #DLD\par -previously high June 2020\par \par -repeat lipids\par -c/w atorvastatin, prescription renewed\par \par #Insomnia\par -pt has previously responded to trazodone \par \par -renew trazodone \par -advised avoidance of caffeine at night\par \par #Osteoarthritis (L hip)\par #Chronic back pain\par -on pregabalin 75mg TID\par \par -renewed pregabalin 75mg TID\par \par #Obesity (BMI 32)\par -walks 15 mins per day\par -pt drinks 3 bottles (20oz) of soda per day\par \par -diet and aerobic exercise advised\par -reduction in soda intake advised\par -A1c, TSH, lipids ordered\par \par \par #HCM\par -covid:  [employer] x 3 (in [place])\par -CBC, CMP, TSH, lipids, a1c\par -PTT, PT, INR\par -flu:  high dose flu vaccine given per request\par -colono:  2006 per pt reported as "normal"

## 2022-05-02 DIAGNOSIS — Z00.00 ENCOUNTER FOR GENERAL ADULT MEDICAL EXAMINATION WITHOUT ABNORMAL FINDINGS: ICD-10-CM

## 2022-05-02 DIAGNOSIS — E55.9 VITAMIN D DEFICIENCY, UNSPECIFIED: ICD-10-CM

## 2022-05-02 DIAGNOSIS — H90.3 SENSORINEURAL HEARING LOSS, BILATERAL: ICD-10-CM

## 2022-05-02 PROBLEM — M19.90 OSTEOARTHRITIS: Status: ACTIVE | Noted: 2018-01-25

## 2022-05-02 PROBLEM — I44.39 HIGH DEGREE ATRIOVENTRICULAR BLOCK: Status: ACTIVE | Noted: 2021-07-13

## 2022-05-02 NOTE — HISTORY OF PRESENT ILLNESS
[FreeTextEntry1] : Follow up urge incontinence [de-identified] : 75 y/o M w/ PMHx Urinary Incontinence, Osteoarthritis, Chronic Back Pain, Insomnia, Dyslipidemia, and Complete Heart Block s/p pacemaker [id number], and obesity presents for follow up. \par \par Pt is accompanied by son, who is providing history. Pt still endorses pain in right head, was referred to ENT and is awaiting MRI for evaluation. \par No active issues, denies any fever, cough, SOB, n/v/d or weight changes.

## 2022-05-02 NOTE — ASSESSMENT
[FreeTextEntry1] : \par # Urge Urinary Incontinence\par -no incontinence when coughing or straining\par -no issue initiating urinary stream\par -sudden urge to urine\par -no dysuria\par -pt on lasix 40mg qd\par -Send U/A and UCx\par -oxybutynin prescribed (pt has taken this in the past)\par -urology follow up (Dr. Donohue)\par \par \par # Asymmetric right sided SNHL / right ear opain\par - f/u ENT, f/u MRI\par \par # LE Edema, pitting\par -Echo Feb2022: EF 60%, G1DD, mild AVR and mild TVR\par -on lasix 40mg qd (started one month ago) - refilled Rx\par - f/u cardio\par \par # CHB s/p PPM for heart block in Jul2021 (w/ lead replacement Feb2022)\par - no SOB, dizziness, palpations, chest pain since lead replacement\par - regular cardio follow up\par \par # DLD\par - previously high June 2020\par  -repeat lipids\par -c/w atorvastatin, prescription renewed\par \par # Insomnia - improved\par - c/w trazodone\par \par #Osteoarthritis (L hip)\par #Chronic back pain\par - c/w pregabalin 75mg TID\par - f/u pain mgmt in [place]\par \par #Obesity (BMI 32)\par - diet and exercise advised\par \par #HCM\par - covid: [employer] x 3 (in [place])\par -flu: upto date\par -colono: 2016, three 3mm polyps tubular adenoma, f/u GI again. \par - routine labs\par - RTC in 6 months\par

## 2022-05-03 ENCOUNTER — OUTPATIENT (OUTPATIENT)
Dept: OUTPATIENT SERVICES | Facility: HOSPITAL | Age: 77
LOS: 1 days | Discharge: HOME | End: 2022-05-03
Payer: MEDICAID

## 2022-05-03 ENCOUNTER — RESULT REVIEW (OUTPATIENT)
Age: 77
End: 2022-05-03

## 2022-05-03 DIAGNOSIS — H90.3 SENSORINEURAL HEARING LOSS, BILATERAL: ICD-10-CM

## 2022-05-03 DIAGNOSIS — Z87.828 PERSONAL HISTORY OF OTHER (HEALED) PHYSICAL INJURY AND TRAUMA: Chronic | ICD-10-CM

## 2022-05-03 PROCEDURE — 70553 MRI BRAIN STEM W/O & W/DYE: CPT | Mod: 26

## 2022-05-06 ENCOUNTER — OUTPATIENT (OUTPATIENT)
Dept: OUTPATIENT SERVICES | Facility: HOSPITAL | Age: 77
LOS: 1 days | Discharge: HOME | End: 2022-05-06

## 2022-05-06 ENCOUNTER — NON-APPOINTMENT (OUTPATIENT)
Age: 77
End: 2022-05-06

## 2022-05-06 ENCOUNTER — APPOINTMENT (OUTPATIENT)
Dept: GASTROENTEROLOGY | Facility: CLINIC | Age: 77
End: 2022-05-06
Payer: MEDICAID

## 2022-05-06 VITALS
BODY MASS INDEX: 32.44 KG/M2 | OXYGEN SATURATION: 99 % | SYSTOLIC BLOOD PRESSURE: 137 MMHG | HEIGHT: 64 IN | WEIGHT: 190 LBS | DIASTOLIC BLOOD PRESSURE: 83 MMHG | TEMPERATURE: 98.1 F | HEART RATE: 83 BPM

## 2022-05-06 DIAGNOSIS — K21.9 GASTRO-ESOPHAGEAL REFLUX DISEASE W/OUT ESOPHAGITIS: ICD-10-CM

## 2022-05-06 DIAGNOSIS — Z98.890 OTHER SPECIFIED POSTPROCEDURAL STATES: ICD-10-CM

## 2022-05-06 DIAGNOSIS — Z87.828 PERSONAL HISTORY OF OTHER (HEALED) PHYSICAL INJURY AND TRAUMA: Chronic | ICD-10-CM

## 2022-05-06 PROCEDURE — 99204 OFFICE O/P NEW MOD 45 MIN: CPT | Mod: GC

## 2022-05-06 NOTE — ASSESSMENT
[FreeTextEntry1] : 75 y/o M w/ PMHx Urinary Incontinence, Osteoarthritis, Chronic Back Pain, Insomnia, Dyslipidemia, and Complete Heart Block s/p pacemaker [id number], and obesity presents for a follow up visit. \par \par #GERD \par - Patient endorses heartburn, no alarm symptoms present \par - EGD 2016 showed severe gastritis through out the stomach \par - Diet and lifestyle modification \par - Was treated for H-pylori with triple therapy, subsequent stool antigen negative \par - Protonix 40mg before breakfast \par - F/u in 3 months \par - Patient will benefit from EGD, HF evalution before EGD \par \par #Hx of previous colonoscopy \par #Constipation \par - Last colonoscopy in 2016 significant for 4 3mm polys path showing tubular adenoma \par - F/u colonoscopy in 5 years \par - C/w Miralax and Colase for constipation \par - Patient is endorsing SOB and has LE edema \par - Patient will need HF referral with Dr. Mejia for evaluation before EGD and colonoscopy \par

## 2022-05-06 NOTE — HISTORY OF PRESENT ILLNESS
[Heartburn] : stable heartburn [Nausea] : denies nausea [Vomiting] : denies vomiting [Diarrhea] : denies diarrhea [Constipation] : stable constipation [Yellow Skin Or Eyes (Jaundice)] : denies jaundice [Abdominal Pain] : denies abdominal pain [Abdominal Swelling] : denies abdominal swelling [Rectal Pain] : denies rectal pain [_________] : Performed [unfilled] [Wt Gain ___ Lbs] : no recent weight gain [Wt Loss ___ Lbs] : no recent weight loss [de-identified] : 75 y/o M w/ PMHx Urinary Incontinence, Osteoarthritis, Chronic Back Pain, Insomnia, Dyslipidemia, and Complete Heart Block s/p pacemaker [id number], H-pylori s/p triple therapy and eradication presents for a follow up visit. \par Patient endorses heartburn, denies dysphagia, odynophagia. Patient denies abdominal pain, n/v/d/. Patient endorses constipation but no BRBPR. Patient denies hematemesis, hematochezia, or melena. Patient denies any weight changes. Ptient is endorsing SOB and LE edema. \par Patient had colonoscopy in 2016 with good prep, significant for 4 3mm polyps, path + for tubular adenoma\par Patient Had endoscopy in 2016 is significant for severe gastritis in the entire stomach. \par Patient denies any Hx of inflammatory bowl disease \par Patient denies any family history of gastric or colorectal cancer\par

## 2022-05-06 NOTE — END OF VISIT
[FreeTextEntry3] : 76-year-old male history of first-degree AV block status post pace maker unclear whether he has ischemic heart disease.  Patient is on Lasix for edema in the lower extremities and shortness of breath?  CHF?.  Patient referred from his primary for surveillance colonoscopy for history of colon adenoma in 2016.  3 polyps adenomatous were removed by exam.  He also endorses acid reflux at the moment without dysphagia.  Would recommend evaluation by cardiology/heart failure service for further evaluation risk ratification and possible optimization prior to endoscopy of an acceptable risk.  We will follow-up

## 2022-05-06 NOTE — PHYSICAL EXAM
[General Appearance - Alert] : alert [General Appearance - In No Acute Distress] : in no acute distress [Respiration, Rhythm And Depth] : normal respiratory rhythm and effort [Exaggerated Use Of Accessory Muscles For Inspiration] : no accessory muscle use [Chest Palpation] : palpation of the chest revealed no abnormalities [Apical Impulse] : the apical impulse was normal [Heart Rate And Rhythm] : heart rate was normal and rhythm regular [Heart Sounds] : normal S1 and S2 [Murmurs] : no murmurs [Bowel Sounds] : normal bowel sounds [Abdomen Soft] : soft [Abdomen Tenderness] : non-tender [] : no hepato-splenomegaly [Abdomen Mass (___ Cm)] : no abdominal mass palpated [Abdomen Hernia] : no hernia was discovered

## 2022-05-06 NOTE — REVIEW OF SYSTEMS
[Lower Ext Edema] : lower extremity edema [Shortness Of Breath] : shortness of breath [SOB on Exertion] : shortness of breath during exertion [Heartburn] : heartburn [Fever] : no fever [Chills] : no chills [Heart Rate Is Slow] : the heart rate was not slow [Chest Pain] : no chest pain [Palpitations] : no palpitations [Wheezing] : no wheezing [Cough] : no cough [Orthopnea] : no orthopnea [PND] : no PND [Abdominal Pain] : no abdominal pain [Vomiting] : no vomiting [Constipation] : no constipation [Diarrhea] : no diarrhea [Melena] : no melena

## 2022-05-11 NOTE — HISTORY OF PRESENT ILLNESS
[de-identified] : 77 y/o male who was admitted to [facility] for symptomatic bradycardia/ 2:1 AVB underwent a DC PPM on 7/6/2021 \par Returns for urgent follow up. \par \par Accompanied by son, who has assisted with translation. Patient speaks Australian. \par \par Patient reports chest pain and dizziness for the past 3 days. Denies LOC. \par  \par \par \par Cardiac tests: \par ECG (2/17/2022): SR,  paced \par ECG( 10/13/2021); SR at 79 bpm, V paced \par ECG (7/13/2021): NSR at 79 BPM, V paced .  \par ECHO (7/6/2021): Normal LVF, trace MR, Mild TR/AR   \par \par  \par

## 2022-05-11 NOTE — ASSESSMENT
[FreeTextEntry1] : s/p DC PPM for High degree AVB on 7/6/2021\par \par \par \par PPM interrogation:RV lead lead malfunction, with recurrent noise with V pacing inhibition. \par RV lead noise was reproducible with isometric maneuvers. \par patient is c/o chest pain and dizziness. \par device reprogrammed to DOO at 80  bpm, with RV output at 5.0V \par VS remained stable. Pulse ox - 92-94, placed on O2- 4L/NC \par \par Called 911 to transport to ER. \par -Admit to CCU \par -CXR \par -plan RV lead revision tomorrow with Dr. Olivia \par -ECHO \par -Cardiac enzymes  \par  \par \par Addendum: \par \par ER for RV lead revision cecy\par CCU\par CXR\par TTE\par \par I spent total 90 minutes in preparing for the visit (such as reviewing tests); getting or reviewing a history that was separately obtained; performing the exam; counseling and providing education to the patient, family, or caregiver; ordering medicines, tests, or procedures; communicating with other healthcare professionals; documenting information in the medical record; interpreting results and sharing that information with the patient, family, or caregiver; and care coordination.\par

## 2022-05-11 NOTE — PROCEDURE
[Complete Heart Block] : complete heart block [See Scanned Paceart Report] : See scanned paceart report [See Device Printout] : See device printout [Pacemaker] : pacemaker [DDD] : DDD [Voltage: ___ volts] : Voltage was [unfilled] volts [Longevity: ___ months] : The estimated remaining battery life is [unfilled] months [Threshold Testing Performed] : Threshold testing was performed [Atrial] : Atrial [Ventricular] : Ventricular [Sensing Amplitude ___mv] : sensing amplitude was [unfilled] mv [Lead Imp:  ___ohms] : lead impedance was [unfilled] ohms [___V @] : [unfilled] V [___ ms] : [unfilled] ms [Outputs/Safety Margin] : output changed to allow for adequate safety margin [Programmed for Longevity] : output reprogrammed for improved battery longevity [Asense-Vpace ___ %] : Asense-Vpace [unfilled]% [Apace-Vpace ___ %] : Apace-Vpace [unfilled]% [de-identified] : 82 BPM [de-identified] : RERE [de-identified] : Assurity MRI 9291 [de-identified] : [id number] [de-identified] : 7/6/2021 [de-identified] :  [de-identified] : Noise with pacing inhibition

## 2022-05-12 ENCOUNTER — NON-APPOINTMENT (OUTPATIENT)
Age: 77
End: 2022-05-12

## 2022-05-20 ENCOUNTER — NON-APPOINTMENT (OUTPATIENT)
Age: 77
End: 2022-05-20

## 2022-05-20 ENCOUNTER — APPOINTMENT (OUTPATIENT)
Dept: CARDIOLOGY | Facility: CLINIC | Age: 77
End: 2022-05-20

## 2022-05-20 ENCOUNTER — APPOINTMENT (OUTPATIENT)
Age: 77
End: 2022-05-20
Payer: SELF-PAY

## 2022-05-20 PROCEDURE — 93296 REM INTERROG EVL PM/IDS: CPT

## 2022-05-20 PROCEDURE — 93295 DEV INTERROG REMOTE 1/2/MLT: CPT

## 2022-05-24 ENCOUNTER — APPOINTMENT (OUTPATIENT)
Dept: OTOLARYNGOLOGY | Facility: CLINIC | Age: 77
End: 2022-05-24
Payer: MEDICAID

## 2022-05-24 DIAGNOSIS — B36.9 SUPERFICIAL MYCOSIS, UNSPECIFIED: ICD-10-CM

## 2022-05-24 DIAGNOSIS — H90.3 SENSORINEURAL HEARING LOSS, BILATERAL: ICD-10-CM

## 2022-05-24 DIAGNOSIS — M26.609 UNSPECIFIED TEMPOROMANDIBULAR JOINT DISORDER: ICD-10-CM

## 2022-05-24 DIAGNOSIS — H62.40 SUPERFICIAL MYCOSIS, UNSPECIFIED: ICD-10-CM

## 2022-05-24 PROCEDURE — 99215 OFFICE O/P EST HI 40 MIN: CPT | Mod: 25

## 2022-05-24 NOTE — ASSESSMENT
[FreeTextEntry1] : I explained to the patient the pathophysiology of TMJ dysfunction, causing referred otalgia. I showed the impact of an  uneven bite/occlusion. \par During painful episodes, I recommended using slightly warm compresses to relieve the spasm of the masticators muscles, eating soft food, masticating on both sides of the jaw instead of one side,  in addition to using NSAIDs. I also explained the risks of side effects related to NSAIDs including stomach ulcers and recommended gastric protection while using NSAIDs. \par I also discussed the importance of seeing the dentist to align the bite to avoid a recurrence of the problem down the road.\par \par Part of discussion and history with patient's son.\par \par Culture taken. \par \par I personally reviewed, interpreted and discussed patient's MRI brain and IACs  images. WNL.\par \par I explained the diagnosis and pathophysiology of tinnitus and recommended white noise at night and in quiet environment.\par \par

## 2022-05-24 NOTE — REASON FOR VISIT
[Subsequent Evaluation] : a subsequent evaluation for [FreeTextEntry2] : hearing loss, fungal otitis externa

## 2022-05-24 NOTE — PHYSICAL EXAM
[Normal] : mucosa is normal [Midline] : trachea located in midline position [de-identified] : right TMJ pain on palpation.

## 2022-05-24 NOTE — HISTORY OF PRESENT ILLNESS
[FreeTextEntry1] : Patient presents today following up on hearing loss, fungal otitis externa. Continues to have pain and sound in the right ear. Patient here to discuss MRI results.

## 2022-05-27 ENCOUNTER — OUTPATIENT (OUTPATIENT)
Dept: OUTPATIENT SERVICES | Facility: HOSPITAL | Age: 77
LOS: 1 days | Discharge: HOME | End: 2022-05-27

## 2022-05-27 DIAGNOSIS — Z87.828 PERSONAL HISTORY OF OTHER (HEALED) PHYSICAL INJURY AND TRAUMA: Chronic | ICD-10-CM

## 2022-05-31 LAB — BACTERIA SPEC CULT: ABNORMAL

## 2022-06-02 ENCOUNTER — APPOINTMENT (OUTPATIENT)
Dept: UROLOGY | Facility: CLINIC | Age: 77
End: 2022-06-02
Payer: SELF-PAY

## 2022-06-02 ENCOUNTER — OUTPATIENT (OUTPATIENT)
Dept: OUTPATIENT SERVICES | Facility: HOSPITAL | Age: 77
LOS: 1 days | Discharge: HOME | End: 2022-06-02

## 2022-06-02 ENCOUNTER — NON-APPOINTMENT (OUTPATIENT)
Age: 77
End: 2022-06-02

## 2022-06-02 VITALS
DIASTOLIC BLOOD PRESSURE: 85 MMHG | WEIGHT: 197 LBS | BODY MASS INDEX: 33.63 KG/M2 | HEART RATE: 97 BPM | HEIGHT: 64 IN | TEMPERATURE: 97.5 F | SYSTOLIC BLOOD PRESSURE: 126 MMHG | OXYGEN SATURATION: 94 %

## 2022-06-02 DIAGNOSIS — Z87.828 PERSONAL HISTORY OF OTHER (HEALED) PHYSICAL INJURY AND TRAUMA: Chronic | ICD-10-CM

## 2022-06-02 PROCEDURE — 99213 OFFICE O/P EST LOW 20 MIN: CPT

## 2022-06-02 RX ORDER — OXYBUTYNIN CHLORIDE 15 MG/1
15 TABLET, EXTENDED RELEASE ORAL
Qty: 1 | Refills: 1 | Status: COMPLETED | COMMUNITY
Start: 2022-06-02

## 2022-06-02 NOTE — ASSESSMENT
[FreeTextEntry1] : 75 y/o m presenting for follow-up of urinary incontinence. Has continued on oxybutynin 15mg xl, uses 2 diapers per day. Has medium stream, + frequency q 20 minutes -1 hr, + nocturia x 4-6 episodes, + urgency with intermittent incontinence. No dysuria, hematuria, no hesitancy , voids in small amounts but reports complete emptying.\par \par Pt also has a very firm prostate on KISHAN. PSA was 2.52 \par prostate biopsy done last visit: all 12 cores were benign\par \par Renal sonogram from August showed 4mm right renal stone and bilateral cysts - he underwent laser lithotripsy \par \par Aug 2018:\par ucx = no growth\par PSA 2.52\par UA = protein, - Nit. \par \par  24 hour urine: from June 2019\par UOP = 2L\par urine oxalate 51\par uric acid high at 0.96. \par \par April 2022:\par UA negative, culture contaminated\par

## 2022-06-02 NOTE — HISTORY OF PRESENT ILLNESS
[FreeTextEntry1] : 77 y/o m presenting for follow-up of urinary incontinence. Has continued on oxybutynin 15mg xl, uses 2 diapers per day. Has medium stream, + frequency q 20 minutes -1 hr, + nocturia x 4-6 episodes, + urgency with intermittent incontinence. No dysuria, hematuria, no hesitancy , voids in small amounts but reports complete emptying.\par \par Pt also has a very firm prostate on KISHAN. PSA was 2.52 \par prostate biopsy done last visit: all 12 cores were benign\par \par Renal sonogram from August showed 4mm right renal stone and bilateral cysts - he underwent laser lithotripsy \par \par Aug 2018:\par ucx = no growth\par PSA 2.52\par UA = protein, - Nit. \par \par  24 hour urine: from June 2019\par UOP = 2L\par urine oxalate 51\par uric acid high at 0.96. \par \par April 2022:\par UA negative, culture contaminated\par

## 2022-06-08 ENCOUNTER — APPOINTMENT (OUTPATIENT)
Dept: CARDIOLOGY | Facility: CLINIC | Age: 77
End: 2022-06-08
Payer: MEDICAID

## 2022-06-08 VITALS
SYSTOLIC BLOOD PRESSURE: 126 MMHG | OXYGEN SATURATION: 95 % | WEIGHT: 197 LBS | BODY MASS INDEX: 33.63 KG/M2 | HEART RATE: 89 BPM | HEIGHT: 64 IN | DIASTOLIC BLOOD PRESSURE: 80 MMHG

## 2022-06-08 PROCEDURE — 99214 OFFICE O/P EST MOD 30 MIN: CPT | Mod: 25

## 2022-06-08 PROCEDURE — 93280 PM DEVICE PROGR EVAL DUAL: CPT

## 2022-06-08 PROCEDURE — 93000 ELECTROCARDIOGRAM COMPLETE: CPT | Mod: 59

## 2022-06-08 NOTE — ASSESSMENT
[FreeTextEntry1] : ## Complete heart block s/p DC-PPM s/p Insulation failure - Revision of Removal+ new lead implants (SJM, 22, Dep)\par ##  Pre-op Assessment\par \par - PPM interrogation shows normally functioning DC-PPM. Battery life ok. No new events. \par - Ok to proceed with endoscopy/colonoscopy. PPM check before to change mode to DOO and post-op check.\par - Abx prophylaxis prior to procedure\par - Remote Monitoring\par - Cardio Referral\par - Return in 6 months

## 2022-06-08 NOTE — HISTORY OF PRESENT ILLNESS
[de-identified] : The patient noted the following symptom(s):. 77 y/o male who was admitted to [facility] for symptomatic bradycardia/ 2:1 AVB underwent a DC PPM on 7/6/2021 \par Returns for urgent follow up. \par \par Accompanied by son, who has assisted with translation. Patient speaks [place]. \par \par Patient reports chest pain and dizziness for the past 3 days. Denies LOC. \par \par 3/9/22: Lead insulation failure- CHB s/p lead removal and new lead insertion. Feels great.\par 6/8/22: Feels fine. \par \par Denies chest pain, shortness of breath, palpitation, dizziness or LOC except noted above.\par \par \par Cardiac tests: \par EKG (6/8): SR-@89\par EKG (3/9/22): SR @@70\par ECG (2/17/2022): SR,  paced \par ECG( 10/13/2021); SR at 79 bpm, V paced \par ECG (7/13/2021): NSR at 79 BPM, V paced. \par ECHO (7/6/2021): Normal LVF, trace MR, Mild TR/AR

## 2022-06-08 NOTE — PROCEDURE
[Pacemaker] : pacemaker [DDDR] : DDDR [Voltage: ___ volts] : Voltage was [unfilled] volts [Magnet Rate: ___ Ppm] : magnet rate was [unfilled] Ppm [Normal] : The battery status is normal. [Sensing Amplitude ___mv] : sensing amplitude was [unfilled] mv [Lead Imp:  ___ohms] : lead impedance was [unfilled] ohms [___V @] : [unfilled] V [___ ms] : [unfilled] ms [de-identified] : [facility] [employer] [de-identified] : [id number] [de-identified] : [id number] [de-identified] : 07/06/2021 [de-identified] : 54 [de-identified] : 11 Years [de-identified] : AP: <1%\par : >99%\par 1 HVR episode on 5/11/2022 at 5:11am Lasting 02s 7 Beats @ 182BPM\par 1 PMT episode on 5/11/2022 at 3:52am

## 2022-06-11 ENCOUNTER — OUTPATIENT (OUTPATIENT)
Dept: OUTPATIENT SERVICES | Facility: HOSPITAL | Age: 77
LOS: 1 days | Discharge: HOME | End: 2022-06-11
Payer: MEDICAID

## 2022-06-11 DIAGNOSIS — Z87.828 PERSONAL HISTORY OF OTHER (HEALED) PHYSICAL INJURY AND TRAUMA: Chronic | ICD-10-CM

## 2022-06-11 DIAGNOSIS — R32 UNSPECIFIED URINARY INCONTINENCE: ICD-10-CM

## 2022-06-11 PROCEDURE — 76857 US EXAM PELVIC LIMITED: CPT | Mod: 26

## 2022-07-14 ENCOUNTER — APPOINTMENT (OUTPATIENT)
Dept: CARDIOLOGY | Facility: CLINIC | Age: 77
End: 2022-07-14

## 2022-07-24 NOTE — PATIENT PROFILE ADULT - NSPROGENSOURCEINFO_GEN_A_NUR
Take all medications as prescribed.  Eat a strict low salt cardiac copd diet.  Follow up with your physicians as scheduled - pcp within 1 week.  Thank you for trusting [facility] and Dr. Mccloud with your care.  We are honored that you entrusted us with your healthcare needs. Your satisfaction is very important to us and we hope you have been very pleased with your experience at [facility]. After your discharge you may receive a survey asking you to rate your hospital experience. We encourage you to take the time to complete the survey as your feedback allows us to identify areas for improvement as well as recognize our staff.   We hope that you have received the very best care possible during your hospitalization at [facility], as your satisfaction is our top priority.    
patient/family

## 2022-07-28 ENCOUNTER — APPOINTMENT (OUTPATIENT)
Dept: UROLOGY | Facility: CLINIC | Age: 77
End: 2022-07-28

## 2022-07-28 ENCOUNTER — OUTPATIENT (OUTPATIENT)
Dept: OUTPATIENT SERVICES | Facility: HOSPITAL | Age: 77
LOS: 1 days | Discharge: HOME | End: 2022-07-28

## 2022-07-28 ENCOUNTER — NON-APPOINTMENT (OUTPATIENT)
Age: 77
End: 2022-07-28

## 2022-07-28 VITALS
HEIGHT: 64 IN | HEART RATE: 100 BPM | DIASTOLIC BLOOD PRESSURE: 89 MMHG | SYSTOLIC BLOOD PRESSURE: 129 MMHG | BODY MASS INDEX: 32.95 KG/M2 | WEIGHT: 193 LBS | TEMPERATURE: 97.1 F | OXYGEN SATURATION: 96 %

## 2022-07-28 DIAGNOSIS — R39.89 OTHER SYMPTOMS AND SIGNS INVOLVING THE GENITOURINARY SYSTEM: ICD-10-CM

## 2022-07-28 DIAGNOSIS — R32 UNSPECIFIED URINARY INCONTINENCE: ICD-10-CM

## 2022-07-28 DIAGNOSIS — Z87.828 PERSONAL HISTORY OF OTHER (HEALED) PHYSICAL INJURY AND TRAUMA: Chronic | ICD-10-CM

## 2022-07-28 PROCEDURE — 99213 OFFICE O/P EST LOW 20 MIN: CPT

## 2022-07-28 NOTE — ASSESSMENT
[FreeTextEntry1] : 75 y/o m presenting for follow-up of urinary incontinence. Since his last visit, he has stopped Oxybutynin 15mg QD because of reported leg swelling that he associates with this medication. Patient states that he feels like he is urinating better off of the medication, though states he is voiding small amounts 1-2 times during the day and 3-4 times at night. Does not wish to resume medication at this time.\par \par Pt also has a very firm prostate on KISHAN. PSA was 2.52 \par prostate biopsy done last visit: all 12 cores were benign\par Renal sonogram from August showed 4mm right renal stone and bilateral cysts - he underwent laser lithotripsy \par \par Aug 2018:\par ucx = no growth\par PSA 2.52\par UA = protein, - Nit. \par \par  24 hour urine: from June 2019\par UOP = 2L\par urine oxalate 51\par uric acid high at 0.96. \par \par April 2022:\par UA negative, culture contaminated\par \par June 2022\par Bladder sono with prevoid 53cc, post void 0ccs. prostate size 21cc.

## 2022-07-28 NOTE — HISTORY OF PRESENT ILLNESS
[FreeTextEntry1] : 77 y/o m presenting for follow-up of urinary incontinence. Since his last visit, he has stopped Oxybutynin 15mg QD because of reported leg swelling that he associates with this medication. Patient states that he feels like he is urinating better off of the medication, though states he is voiding small amounts 1-2 times during the day and 3-4 times at night. Does not wish to resume medication at this time.\par \par Pt also has a very firm prostate on KISHAN. PSA was 2.52 \par prostate biopsy done last visit: all 12 cores were benign\par Renal sonogram from August showed 4mm right renal stone and bilateral cysts - he underwent laser lithotripsy \par \par Aug 2018:\par ucx = no growth\par PSA 2.52\par UA = protein, - Nit. \par \par  24 hour urine: from June 2019\par UOP = 2L\par urine oxalate 51\par uric acid high at 0.96. \par \par April 2022:\par UA negative, culture contaminated\par \par June 2022\par Bladder sono with prevoid 53cc, post void 0ccs. prostate size 21cc.

## 2022-08-17 ENCOUNTER — APPOINTMENT (OUTPATIENT)
Dept: OTOLARYNGOLOGY | Facility: CLINIC | Age: 77
End: 2022-08-17

## 2022-08-18 ENCOUNTER — APPOINTMENT (OUTPATIENT)
Dept: CARDIOLOGY | Facility: CLINIC | Age: 77
End: 2022-08-18

## 2022-08-18 VITALS
WEIGHT: 192.5 LBS | SYSTOLIC BLOOD PRESSURE: 134 MMHG | HEIGHT: 64 IN | TEMPERATURE: 97.4 F | DIASTOLIC BLOOD PRESSURE: 78 MMHG | BODY MASS INDEX: 32.87 KG/M2 | HEART RATE: 93 BPM | OXYGEN SATURATION: 95 %

## 2022-08-18 DIAGNOSIS — Z87.898 PERSONAL HISTORY OF OTHER SPECIFIED CONDITIONS: ICD-10-CM

## 2022-08-18 DIAGNOSIS — R60.0 LOCALIZED EDEMA: ICD-10-CM

## 2022-08-18 DIAGNOSIS — Z71.89 OTHER SPECIFIED COUNSELING: ICD-10-CM

## 2022-08-18 DIAGNOSIS — R03.0 ELEVATED BLOOD-PRESSURE READING, W/OUT DIAGNOSIS OF HYPERTENSION: ICD-10-CM

## 2022-08-18 PROCEDURE — 99213 OFFICE O/P EST LOW 20 MIN: CPT

## 2022-08-18 RX ORDER — ACETIC ACID 20 MG/ML
2 SOLUTION AURICULAR (OTIC)
Qty: 1 | Refills: 2 | Status: DISCONTINUED | COMMUNITY
Start: 2022-05-24 | End: 2022-08-18

## 2022-08-18 RX ORDER — TRAZODONE HYDROCHLORIDE 50 MG/1
50 TABLET ORAL
Qty: 30 | Refills: 5 | Status: DISCONTINUED | COMMUNITY
Start: 2021-07-29 | End: 2022-08-18

## 2022-08-18 RX ORDER — FUROSEMIDE 40 MG/1
40 TABLET ORAL
Qty: 30 | Refills: 4 | Status: DISCONTINUED | COMMUNITY
Start: 2022-03-09 | End: 2022-08-18

## 2022-08-18 RX ORDER — PANTOPRAZOLE 40 MG/1
40 TABLET, DELAYED RELEASE ORAL
Qty: 90 | Refills: 2 | Status: DISCONTINUED | COMMUNITY
Start: 2022-05-06 | End: 2022-08-18

## 2022-08-18 RX ORDER — ADHESIVE TAPE 3"X 2.3 YD
50 MCG TAPE, NON-MEDICATED TOPICAL
Qty: 30 | Refills: 3 | Status: DISCONTINUED | COMMUNITY
Start: 2018-01-30 | End: 2022-08-18

## 2022-08-18 RX ORDER — DOCUSATE SODIUM 100 MG/1
100 CAPSULE ORAL
Qty: 90 | Refills: 0 | Status: DISCONTINUED | COMMUNITY
Start: 2022-05-06 | End: 2022-08-18

## 2022-08-18 RX ORDER — ATORVASTATIN CALCIUM 20 MG/1
20 TABLET, FILM COATED ORAL
Qty: 30 | Refills: 5 | Status: DISCONTINUED | COMMUNITY
Start: 2018-01-30 | End: 2022-08-18

## 2022-08-18 RX ORDER — PREGABALIN 25 MG/1
25 CAPSULE ORAL TWICE DAILY
Refills: 0 | Status: ACTIVE | COMMUNITY
Start: 2022-08-18

## 2022-08-18 RX ORDER — PREGABALIN 75 MG/1
75 CAPSULE ORAL
Qty: 30 | Refills: 3 | Status: DISCONTINUED | COMMUNITY
Start: 2020-03-12 | End: 2022-08-18

## 2022-08-18 RX ORDER — TAMSULOSIN HYDROCHLORIDE 0.4 MG/1
0.4 CAPSULE ORAL
Qty: 30 | Refills: 5 | Status: DISCONTINUED | COMMUNITY
Start: 2019-06-10 | End: 2022-08-18

## 2022-08-18 RX ORDER — OFLOXACIN OTIC 3 MG/ML
0.3 SOLUTION AURICULAR (OTIC)
Qty: 1 | Refills: 0 | Status: DISCONTINUED | COMMUNITY
Start: 2022-05-31 | End: 2022-08-18

## 2022-08-18 RX ORDER — POLYETHYLENE GLYCOL 3350 17 G/17G
17 POWDER, FOR SOLUTION ORAL
Qty: 17 | Refills: 3 | Status: DISCONTINUED | COMMUNITY
Start: 2022-05-06 | End: 2022-08-18

## 2022-08-18 RX ORDER — OXYBUTYNIN CHLORIDE 15 MG/1
15 TABLET, EXTENDED RELEASE ORAL
Qty: 30 | Refills: 5 | Status: DISCONTINUED | COMMUNITY
Start: 2019-07-24 | End: 2022-08-18

## 2022-08-18 NOTE — PHYSICAL EXAM
[Well Developed] : well developed [Well Nourished] : well nourished [No Acute Distress] : no acute distress [Normal Conjunctiva] : normal conjunctiva [No Carotid Bruit] : no carotid bruit [Normal S1, S2] : normal S1, S2 [No Murmur] : no murmur [No Rub] : no rub [No Gallop] : no gallop [Clear Lung Fields] : clear lung fields [Good Air Entry] : good air entry [No Respiratory Distress] : no respiratory distress  [Moves all extremities] : moves all extremities [No Focal Deficits] : no focal deficits [Normal Speech] : normal speech [No ulcers] : no ulcers [No varicosities] : no varicosities [No chronic venous stasis changes] : no chronic venous stasis changes [No cyanosis] : no cyanosis [No rashes] : no rashes [Present] : present bilaterally [Trace] : Left: trace

## 2022-08-18 NOTE — REVIEW OF SYSTEMS
[Negative] : Heme/Lymph [Fever] : no fever [Chills] : no chills [Feeling Fatigued] : not feeling fatigued [SOB] : no shortness of breath [Dyspnea on exertion] : not dyspnea during exertion [Chest Discomfort] : no chest discomfort [Lower Ext Edema] : no extremity edema [Leg Claudication] : no intermittent leg claudication [Palpitations] : no palpitations [Orthopnea] : no orthopnea [Syncope] : no syncope

## 2022-08-18 NOTE — HISTORY OF PRESENT ILLNESS
[FreeTextEntry1] : Pt is 77 year old Male with PMH of BLE edema, Chronic back pain, HL, s/p hospitalization at [facility] on 7/26/21 for  symptomatic bradycardia 2:1, Heart block , S/p PPM placement, s/p insulation failure s/p revision of removal + new lead implants .  S/p PPM interrogation.  As per pt and his son ( on the phone) pt is only taking Lyrica and Calcium stopped taking all medications 1 month ago.  Pt denies any chest pain, no SOB, no palpitations.  Pt is able to walk 1 block and has to stop due to B hip pain.  No recent BLE edema.  \par \par TTE 2/19/22 EF 60 %  Grade I diastolic dysfunction, Mild TR, Mild AR. 4/1/22 Chol 142 Trig 88 LDL 85  HgbA1C 5.2 \par \par  ID #[id number]

## 2022-08-18 NOTE — ASSESSMENT
[FreeTextEntry1] : Assessment:\par #Hxo CHB s/p DC PPM\par #LE edema\par - Trace edema today; patient reports that is is much improved after discontinuing most of his medications\par - Palp DP b/l \par #G1DD\par #Health maintenance \par - 4/1/22 , TG 88, HDL 42, LDL 85 (unclear if on statin at this time)\par - 4/1/22 Hgb A1c 5.2%\par #Elevated BP\par - 134/78\par #BMI 33\par \par Plan:\par - Monitor BP, not on anti-hypertensive agents\par - Leg elevation, patient to call office if edema worsens\par - EP device check next month and Dr. Olivia appt 12/22\par - Return to clinic in 6 months or sooner PRN\par

## 2022-08-25 ENCOUNTER — APPOINTMENT (OUTPATIENT)
Dept: OPHTHALMOLOGY | Facility: CLINIC | Age: 77
End: 2022-08-25

## 2022-09-06 LAB
25(OH)D3 SERPL-MCNC: 28 NG/ML
ALBUMIN SERPL ELPH-MCNC: 4.5 G/DL
ALP BLD-CCNC: 121 U/L
ALT SERPL-CCNC: 22 U/L
ANION GAP SERPL CALC-SCNC: 17 MMOL/L
APPEARANCE: CLEAR
APTT BLD: 34.9 SEC
AST SERPL-CCNC: 23 U/L
BACTERIA UR CULT: NORMAL
BASOPHILS # BLD AUTO: 0.09 K/UL
BASOPHILS NFR BLD AUTO: 0.9 %
BILIRUB SERPL-MCNC: 1 MG/DL
BILIRUBIN URINE: NEGATIVE
BLOOD URINE: NEGATIVE
BUN SERPL-MCNC: 17 MG/DL
CALCIUM SERPL-MCNC: 9 MG/DL
CHLORIDE SERPL-SCNC: 103 MMOL/L
CHOLEST SERPL-MCNC: 142 MG/DL
CO2 SERPL-SCNC: 23 MMOL/L
COLOR: YELLOW
CREAT SERPL-MCNC: 1.1 MG/DL
EGFR: 69 ML/MIN/1.73M2
EOSINOPHIL # BLD AUTO: 0.34 K/UL
EOSINOPHIL NFR BLD AUTO: 3.4 %
ESTIMATED AVERAGE GLUCOSE: 103 MG/DL
GLUCOSE QUALITATIVE U: NEGATIVE
GLUCOSE SERPL-MCNC: 91 MG/DL
HBA1C MFR BLD HPLC: 5.2 %
HCT VFR BLD CALC: 52.6 %
HDLC SERPL-MCNC: 42 MG/DL
HGB BLD-MCNC: 16.7 G/DL
IMM GRANULOCYTES NFR BLD AUTO: 0.3 %
INR PPP: 0.99 RATIO
KETONES URINE: NEGATIVE
LDLC SERPL CALC-MCNC: 85 MG/DL
LEUKOCYTE ESTERASE URINE: NEGATIVE
LYMPHOCYTES # BLD AUTO: 3.41 K/UL
LYMPHOCYTES NFR BLD AUTO: 34.6 %
MAN DIFF?: NORMAL
MCHC RBC-ENTMCNC: 29.9 PG
MCHC RBC-ENTMCNC: 31.7 G/DL
MCV RBC AUTO: 94.3 FL
MONOCYTES # BLD AUTO: 0.66 K/UL
MONOCYTES NFR BLD AUTO: 6.7 %
NEUTROPHILS # BLD AUTO: 5.33 K/UL
NEUTROPHILS NFR BLD AUTO: 54.1 %
NITRITE URINE: NEGATIVE
NONHDLC SERPL-MCNC: 100 MG/DL
PH URINE: 6
PLATELET # BLD AUTO: 169 K/UL
POTASSIUM SERPL-SCNC: 3.8 MMOL/L
PROT SERPL-MCNC: 7.6 G/DL
PROTEIN URINE: NEGATIVE
PT BLD: 11.4 SEC
RBC # BLD: 5.58 M/UL
RBC # FLD: 12.9 %
SODIUM SERPL-SCNC: 143 MMOL/L
SPECIFIC GRAVITY URINE: 1.02
TRIGL SERPL-MCNC: 88 MG/DL
TSH SERPL-ACNC: 2.55 UIU/ML
UROBILINOGEN URINE: NORMAL
WBC # FLD AUTO: 9.86 K/UL

## 2022-09-13 ENCOUNTER — APPOINTMENT (OUTPATIENT)
Dept: CARDIOLOGY | Facility: CLINIC | Age: 77
End: 2022-09-13

## 2022-09-13 ENCOUNTER — NON-APPOINTMENT (OUTPATIENT)
Age: 77
End: 2022-09-13

## 2022-09-13 PROCEDURE — 93294 REM INTERROG EVL PM/LDLS PM: CPT

## 2022-09-13 PROCEDURE — 93296 REM INTERROG EVL PM/IDS: CPT

## 2022-09-14 ENCOUNTER — NON-APPOINTMENT (OUTPATIENT)
Age: 77
End: 2022-09-14

## 2022-10-27 ENCOUNTER — APPOINTMENT (OUTPATIENT)
Dept: UROLOGY | Facility: CLINIC | Age: 77
End: 2022-10-27

## 2022-12-07 ENCOUNTER — APPOINTMENT (OUTPATIENT)
Dept: CARDIOLOGY | Facility: CLINIC | Age: 77
End: 2022-12-07

## 2022-12-13 ENCOUNTER — FORM ENCOUNTER (OUTPATIENT)
Age: 77
End: 2022-12-13

## 2022-12-13 ENCOUNTER — APPOINTMENT (OUTPATIENT)
Dept: CARDIOLOGY | Facility: CLINIC | Age: 77
End: 2022-12-13

## 2023-01-10 ENCOUNTER — FORM ENCOUNTER (OUTPATIENT)
Age: 78
End: 2023-01-10

## 2023-01-11 ENCOUNTER — APPOINTMENT (OUTPATIENT)
Dept: CARDIOLOGY | Facility: CLINIC | Age: 78
End: 2023-01-11

## 2023-02-03 ENCOUNTER — APPOINTMENT (OUTPATIENT)
Dept: INTERNAL MEDICINE | Facility: CLINIC | Age: 78
End: 2023-02-03

## 2023-02-14 ENCOUNTER — FORM ENCOUNTER (OUTPATIENT)
Age: 78
End: 2023-02-14

## 2023-02-15 ENCOUNTER — APPOINTMENT (OUTPATIENT)
Dept: CARDIOLOGY | Facility: CLINIC | Age: 78
End: 2023-02-15

## 2023-02-16 ENCOUNTER — APPOINTMENT (OUTPATIENT)
Dept: CARDIOLOGY | Facility: CLINIC | Age: 78
End: 2023-02-16

## 2023-02-23 ENCOUNTER — APPOINTMENT (OUTPATIENT)
Dept: CARDIOLOGY | Facility: CLINIC | Age: 78
End: 2023-02-23
Payer: MEDICAID

## 2023-02-23 ENCOUNTER — NON-APPOINTMENT (OUTPATIENT)
Age: 78
End: 2023-02-23

## 2023-02-23 PROCEDURE — 93294 REM INTERROG EVL PM/LDLS PM: CPT

## 2023-02-23 PROCEDURE — 93296 REM INTERROG EVL PM/IDS: CPT

## 2023-05-24 ENCOUNTER — FORM ENCOUNTER (OUTPATIENT)
Age: 78
End: 2023-05-24

## 2023-05-25 ENCOUNTER — APPOINTMENT (OUTPATIENT)
Dept: CARDIOLOGY | Facility: CLINIC | Age: 78
End: 2023-05-25

## 2023-06-29 ENCOUNTER — APPOINTMENT (OUTPATIENT)
Dept: CARDIOLOGY | Facility: CLINIC | Age: 78
End: 2023-06-29

## 2023-07-02 ENCOUNTER — FORM ENCOUNTER (OUTPATIENT)
Age: 78
End: 2023-07-02

## 2023-07-31 ENCOUNTER — APPOINTMENT (OUTPATIENT)
Dept: CARDIOLOGY | Facility: CLINIC | Age: 78
End: 2023-07-31

## 2023-08-31 ENCOUNTER — APPOINTMENT (OUTPATIENT)
Dept: CARDIOLOGY | Facility: CLINIC | Age: 78
End: 2023-08-31

## 2023-09-13 ENCOUNTER — APPOINTMENT (OUTPATIENT)
Dept: CARDIOLOGY | Facility: CLINIC | Age: 78
End: 2023-09-13
Payer: MEDICAID

## 2023-09-13 ENCOUNTER — NON-APPOINTMENT (OUTPATIENT)
Age: 78
End: 2023-09-13

## 2023-09-14 PROCEDURE — 93294 REM INTERROG EVL PM/LDLS PM: CPT

## 2023-09-14 PROCEDURE — 93296 REM INTERROG EVL PM/IDS: CPT

## 2023-12-13 ENCOUNTER — APPOINTMENT (OUTPATIENT)
Dept: CARDIOLOGY | Facility: CLINIC | Age: 78
End: 2023-12-13

## 2024-01-18 ENCOUNTER — APPOINTMENT (OUTPATIENT)
Dept: CARDIOLOGY | Facility: CLINIC | Age: 79
End: 2024-01-18

## 2024-02-21 ENCOUNTER — APPOINTMENT (OUTPATIENT)
Dept: CARDIOLOGY | Facility: CLINIC | Age: 79
End: 2024-02-21

## 2024-03-29 ENCOUNTER — APPOINTMENT (OUTPATIENT)
Dept: CARDIOLOGY | Facility: CLINIC | Age: 79
End: 2024-03-29

## 2024-07-22 ENCOUNTER — EMERGENCY (EMERGENCY)
Facility: HOSPITAL | Age: 79
LOS: 0 days | Discharge: HOME | End: 2024-07-22
Attending: EMERGENCY MEDICINE
Payer: MEDICAID

## 2024-07-22 ENCOUNTER — APPOINTMENT (OUTPATIENT)
Dept: ELECTROPHYSIOLOGY | Facility: CLINIC | Age: 79
End: 2024-07-22
Payer: MEDICAID

## 2024-07-22 VITALS
WEIGHT: 160 LBS | BODY MASS INDEX: 27.31 KG/M2 | SYSTOLIC BLOOD PRESSURE: 115 MMHG | HEIGHT: 64 IN | HEART RATE: 91 BPM | DIASTOLIC BLOOD PRESSURE: 74 MMHG | TEMPERATURE: 98 F

## 2024-07-22 VITALS
HEART RATE: 56 BPM | SYSTOLIC BLOOD PRESSURE: 134 MMHG | RESPIRATION RATE: 17 BRPM | DIASTOLIC BLOOD PRESSURE: 86 MMHG | TEMPERATURE: 98 F | OXYGEN SATURATION: 100 %

## 2024-07-22 DIAGNOSIS — H53.8 OTHER VISUAL DISTURBANCES: ICD-10-CM

## 2024-07-22 DIAGNOSIS — Z95.0 PRESENCE OF CARDIAC PACEMAKER: ICD-10-CM

## 2024-07-22 DIAGNOSIS — I44.2 ATRIOVENTRICULAR BLOCK, COMPLETE: ICD-10-CM

## 2024-07-22 DIAGNOSIS — Z45.018 ENCOUNTER FOR ADJUSTMENT AND MANAGEMENT OF OTHER PART OF CARDIAC PACEMAKER: ICD-10-CM

## 2024-07-22 DIAGNOSIS — Z87.828 PERSONAL HISTORY OF OTHER (HEALED) PHYSICAL INJURY AND TRAUMA: Chronic | ICD-10-CM

## 2024-07-22 DIAGNOSIS — R42 DIZZINESS AND GIDDINESS: ICD-10-CM

## 2024-07-22 DIAGNOSIS — N40.0 BENIGN PROSTATIC HYPERPLASIA WITHOUT LOWER URINARY TRACT SYMPTOMS: ICD-10-CM

## 2024-07-22 DIAGNOSIS — E78.5 HYPERLIPIDEMIA, UNSPECIFIED: ICD-10-CM

## 2024-07-22 DIAGNOSIS — R11.2 NAUSEA WITH VOMITING, UNSPECIFIED: ICD-10-CM

## 2024-07-22 LAB
ALBUMIN SERPL ELPH-MCNC: 4.1 G/DL — SIGNIFICANT CHANGE UP (ref 3.5–5.2)
ALP SERPL-CCNC: 103 U/L — SIGNIFICANT CHANGE UP (ref 30–115)
ALT FLD-CCNC: 12 U/L — SIGNIFICANT CHANGE UP (ref 0–41)
ANION GAP SERPL CALC-SCNC: 11 MMOL/L — SIGNIFICANT CHANGE UP (ref 7–14)
AST SERPL-CCNC: 17 U/L — SIGNIFICANT CHANGE UP (ref 0–41)
BASOPHILS # BLD AUTO: 0.11 K/UL — SIGNIFICANT CHANGE UP (ref 0–0.2)
BASOPHILS NFR BLD AUTO: 1.1 % — HIGH (ref 0–1)
BILIRUB SERPL-MCNC: 0.6 MG/DL — SIGNIFICANT CHANGE UP (ref 0.2–1.2)
BUN SERPL-MCNC: 21 MG/DL — HIGH (ref 10–20)
CALCIUM SERPL-MCNC: 9.1 MG/DL — SIGNIFICANT CHANGE UP (ref 8.4–10.5)
CHLORIDE SERPL-SCNC: 107 MMOL/L — SIGNIFICANT CHANGE UP (ref 98–110)
CO2 SERPL-SCNC: 24 MMOL/L — SIGNIFICANT CHANGE UP (ref 17–32)
CREAT SERPL-MCNC: 1 MG/DL — SIGNIFICANT CHANGE UP (ref 0.7–1.5)
EGFR: 77 ML/MIN/1.73M2 — SIGNIFICANT CHANGE UP
EOSINOPHIL # BLD AUTO: 0.36 K/UL — SIGNIFICANT CHANGE UP (ref 0–0.7)
EOSINOPHIL NFR BLD AUTO: 3.6 % — SIGNIFICANT CHANGE UP (ref 0–8)
GLUCOSE SERPL-MCNC: 86 MG/DL — SIGNIFICANT CHANGE UP (ref 70–99)
HCT VFR BLD CALC: 52 % — SIGNIFICANT CHANGE UP (ref 42–52)
HGB BLD-MCNC: 17.1 G/DL — SIGNIFICANT CHANGE UP (ref 14–18)
IMM GRANULOCYTES NFR BLD AUTO: 0.3 % — SIGNIFICANT CHANGE UP (ref 0.1–0.3)
LYMPHOCYTES # BLD AUTO: 3.21 K/UL — SIGNIFICANT CHANGE UP (ref 1.2–3.4)
LYMPHOCYTES # BLD AUTO: 31.9 % — SIGNIFICANT CHANGE UP (ref 20.5–51.1)
MCHC RBC-ENTMCNC: 29.7 PG — SIGNIFICANT CHANGE UP (ref 27–31)
MCHC RBC-ENTMCNC: 32.9 G/DL — SIGNIFICANT CHANGE UP (ref 32–37)
MCV RBC AUTO: 90.4 FL — SIGNIFICANT CHANGE UP (ref 80–94)
MONOCYTES # BLD AUTO: 0.77 K/UL — HIGH (ref 0.1–0.6)
MONOCYTES NFR BLD AUTO: 7.7 % — SIGNIFICANT CHANGE UP (ref 1.7–9.3)
NEUTROPHILS # BLD AUTO: 5.57 K/UL — SIGNIFICANT CHANGE UP (ref 1.4–6.5)
NEUTROPHILS NFR BLD AUTO: 55.4 % — SIGNIFICANT CHANGE UP (ref 42.2–75.2)
NRBC # BLD: 0 /100 WBCS — SIGNIFICANT CHANGE UP (ref 0–0)
PLATELET # BLD AUTO: 168 K/UL — SIGNIFICANT CHANGE UP (ref 130–400)
PMV BLD: 9.4 FL — SIGNIFICANT CHANGE UP (ref 7.4–10.4)
POTASSIUM SERPL-MCNC: 4.8 MMOL/L — SIGNIFICANT CHANGE UP (ref 3.5–5)
POTASSIUM SERPL-SCNC: 4.8 MMOL/L — SIGNIFICANT CHANGE UP (ref 3.5–5)
PROT SERPL-MCNC: 7.1 G/DL — SIGNIFICANT CHANGE UP (ref 6–8)
RBC # BLD: 5.75 M/UL — SIGNIFICANT CHANGE UP (ref 4.7–6.1)
RBC # FLD: 13.4 % — SIGNIFICANT CHANGE UP (ref 11.5–14.5)
SODIUM SERPL-SCNC: 142 MMOL/L — SIGNIFICANT CHANGE UP (ref 135–146)
WBC # BLD: 10.05 K/UL — SIGNIFICANT CHANGE UP (ref 4.8–10.8)
WBC # FLD AUTO: 10.05 K/UL — SIGNIFICANT CHANGE UP (ref 4.8–10.8)

## 2024-07-22 PROCEDURE — 70450 CT HEAD/BRAIN W/O DYE: CPT | Mod: MC

## 2024-07-22 PROCEDURE — 96374 THER/PROPH/DIAG INJ IV PUSH: CPT

## 2024-07-22 PROCEDURE — 99214 OFFICE O/P EST MOD 30 MIN: CPT

## 2024-07-22 PROCEDURE — 36415 COLL VENOUS BLD VENIPUNCTURE: CPT

## 2024-07-22 PROCEDURE — 99285 EMERGENCY DEPT VISIT HI MDM: CPT

## 2024-07-22 PROCEDURE — 70450 CT HEAD/BRAIN W/O DYE: CPT | Mod: 26,MC

## 2024-07-22 PROCEDURE — 82962 GLUCOSE BLOOD TEST: CPT

## 2024-07-22 PROCEDURE — 93280 PM DEVICE PROGR EVAL DUAL: CPT

## 2024-07-22 PROCEDURE — 80053 COMPREHEN METABOLIC PANEL: CPT

## 2024-07-22 PROCEDURE — 99284 EMERGENCY DEPT VISIT MOD MDM: CPT | Mod: 25

## 2024-07-22 PROCEDURE — 85025 COMPLETE CBC W/AUTO DIFF WBC: CPT

## 2024-07-22 RX ORDER — SODIUM CHLORIDE 0.9 % (FLUSH) 0.9 %
1000 SYRINGE (ML) INJECTION ONCE
Refills: 0 | Status: COMPLETED | OUTPATIENT
Start: 2024-07-22 | End: 2024-07-22

## 2024-07-22 RX ORDER — MECLIZINE HCL 25 MG
50 TABLET ORAL ONCE
Refills: 0 | Status: COMPLETED | OUTPATIENT
Start: 2024-07-22 | End: 2024-07-22

## 2024-07-22 RX ORDER — TRAMADOL HCL 100 MG
100 TABLET, EXTENDED RELEASE 24 HR ORAL DAILY
Refills: 0 | Status: ACTIVE | COMMUNITY

## 2024-07-22 RX ORDER — METOCLOPRAMIDE 5 MG/5ML
10 SOLUTION ORAL ONCE
Refills: 0 | Status: COMPLETED | OUTPATIENT
Start: 2024-07-22 | End: 2024-07-22

## 2024-07-22 RX ORDER — MECLIZINE HCL 25 MG
1 TABLET ORAL
Qty: 15 | Refills: 0
Start: 2024-07-22 | End: 2024-08-05

## 2024-07-22 RX ADMIN — Medication 1000 MILLILITER(S): at 17:43

## 2024-07-22 RX ADMIN — Medication 50 MILLIGRAM(S): at 17:44

## 2024-07-22 RX ADMIN — METOCLOPRAMIDE 104 MILLIGRAM(S): 5 SOLUTION ORAL at 17:44

## 2024-07-22 NOTE — ED PROVIDER NOTE - NSFOLLOWUPINSTRUCTIONS_ED_ALL_ED_FT
We have sent the prescription to the pharmacy. Please take as instructed.    Mareos  Dizziness  Los mareos son un problema muy frecuente. Causan sensación de inestabilidad o de desvanecimiento. Puede sentir que se va a desmayar. Los mareos pueden provocarle ketty lesión si se tropieza o se .    Es más frecuente sentirse mareado si es un adulto mayor. Hay muchas cosas que pueden hacer que se sienta mareado. [place] incluye lo siguiente:  Medicamentos.  Deshidratación. [place] ocurre cuando no hay suficiente agua en el cuerpo.  Enfermedad.  Siga estas instrucciones en contreras casa:  Comida y bebida    A person drinking water from a glass.  Beber suficiente líquido para mantener el pis (orina) de color amarillo pálido.  [place] chantelle la deshidratación.  Trate de beber más líquidos transparentes, leticia agua.  No dyana alcohol.  Trate de limitar la cantidad de cafeína que consume.  Trate de limitar la cantidad de angi, también llamada sodio, que consume.  Actividad    Trate de no hacer movimientos rápidos.  Párese lentamente después de estar sentado en ketty silla. Sosténgase hasta que se sienta dieter.  Por la mañana, siéntese radha a un lado de la cama. Cuando se sienta dieter, sosténgase de algo y póngase lentamente de pie. Yosvany esto hasta que se sienta seguro en cuanto al equilibrio.  Mueva las piernas con frecuencia si debe estar de pie en un lugar tonya mucho tiempo. Mientras esté de pie, contraiga y relaje los músculos de las piernas.  No conduzca ni use máquinas si se siente mareado.  Evite agacharse si se siente mareado. En contreras casa, coloque los objetos en algún lugar que pueda alcanzarlos sin agacharse.  Estilo de caity    No fume ni use vapeadores o productos que tengan nicotina o tabaco. Si necesita ayuda para dejar de fumar, hable con el médico.  Intente bajar el nivel de estrés. Puede hacerlo usando métodos leticia el yoga o la meditación. Hable con el médico si necesita ayuda.  Instrucciones generales    Controle tina mareos para lillian si hay cambios.  Use los medicamentos solamente leticia se lo haya indicado el médico. Hable con el médico si july que la causa de tina mareos es algún medicamento que está tomando.  Dígale a un amigo o a un familiar si se siente mareado. Si detectan algún cambio en contreras comportamiento, pídales que llamen a contreras médico.  Comuníquese con un médico si:  Los mareos no desaparecen o tiene síntomas nuevos.  Contreras mareo empeora.  Siente que va a vomitar.  Tiene problemas para escuchar.  Tiene fiebre.  Tiene dolor o rigidez en el aysha.  Se  o se lastima.  Solicite ayuda de inmediato si:  Vomita cada vez que come o gareth.  Tiene heces acuosas y no puede comer ni beber.  Tiene problemas para hablar, caminar, tragar o usar los brazos, las alessandro o las piernas.  Se siente muy débil.  Está sangrando.  No piensa con claridad o tiene problemas para armar oraciones. Un amigo o un familiar pueden notar que esto ocurre.  Le cambia la visión o tiene un dolor de saige muy intenso.  Estos síntomas pueden indicar ketty emergencia. Llame al 911 de inmediato.  No espere a lillian si los síntomas desaparecen.  No conduzca por tina propios medios hasta el hospital.  Esta información no tiene leticia fin reemplazar el consejo del médico. Asegúrese de hacerle al médico cualquier pregunta que tenga.    Vértigo posicional cookie  Benign Positional Vertigo  El vértigo es la sensación de que usted o todo lo que lo rodea se mueve cuando en realidad eso no sucede. El vértigo posicional cookie es el tipo de vértigo más común. Generalmente se trata de ketty enfermedad no dañina (benigna). Esta afección es posicional. [place] significa que los síntomas son desencadenados por ciertos movimientos y posiciones.    Esta afección puede ser peligrosa si ocurre mientras está haciendo algo que podría suponer un riesgo para usted o para los demás. Incluye actividades leticia conducir u operar maquinaria.    ¿Cuáles son las causas?  El oído interno tiene alberto llenos de líquido que ayudan a que el cerebro perciba el movimiento y el equilibrio. Cuando el líquido se mueve, el cerebro recibe mensajes sobre la posición del cuerpo.    En el vértigo posicional cookie, los maximus de calcio que están en el oído interno se desprenden y alteran la goyo del oído interno. [place] hace que el cerebro reciba mensajes confusos sobre la posición del cuerpo.    ¿Qué incrementa el riesgo?  Es más probable que sufra esta afección si:  Es milagro.  Es mayor de 50 años.  Ha sufrido ketty lesión en la saige recientemente.  Tiene ketty enfermedad en el oído interno.  ¿Cuáles son los signos o síntomas?  Generalmente, los síntomas de darienne trastorno se presentan al  la saige o los ojos en diferentes direcciones. Los síntomas pueden aparecer repentinamente y suelen durar menos de un minuto. Incluyen los siguientes:  Pérdida del equilibrio y caídas.  Sensación de estar dando vueltas o moviéndose.  Sensación de que el entorno está dando vueltas o moviéndose.  Náuseas y vómitos.  Visión borrosa.  Mareos.  Movimientos oculares involuntarios (nistagmo).  Los síntomas pueden ser leves y solo causar problemas menores, o pueden ser graves e interferir en la caity cotidiana. Los episodios de vértigo posicional cookie pueden repetirse (volver a aparecer) con el transcurso del tiempo. Los síntomas también pueden mejorar con el tiempo.    ¿Cómo se diagnostica?  Esta afección se puede diagnosticar en función de lo siguiente:  Tina antecedentes médicos.  Un examen físico de la saige, el aysha y los oídos.  Pruebas de posición para detectar o estimular el vértigo. Es posible que le pidan que gire la saige y cambie de posición, leticia pasar de estar sentado a acostarse. El médico estará pendiente por si aparecen síntomas de vértigo.  Petros vez lo deriven a un médico especialista en problemas de la garganta, la nariz y el oído (otorrinolaringólogo), o a miri que se especializa en trastornos del sistema nervioso (neurólogo).    ¿Cómo se trata?  Medical person standing behind sitting patient using both hands to move patient's head to another position.  Esta afección se puede tratar en ketty sesión en la cual el médico le pondrá la saige en posiciones específicas para ayudar a que los maximus desplazados del oído interno se muevan. El tratamiento de esta afección puede llevar varias sesiones. Cuando los casos son graves, petros vez haya que realizar ketty cirugía, nicolas esto no es frecuente.    En algunos casos, el vértigo posicional cookie se resuelve por sí solo en el término de 2 o 4 semanas.    Siga estas instrucciones en contreras casa:  Seguridad    Muévase lentamente. Evite algunas posiciones o determinados movimientos repentinos de la saige y el cuerpo leticia se lo haya indicado el médico.  Evite conducir y operar maquinaria hasta que el médico le indique que es seguro hacerlo.  No yosvany ninguna tarea que podría ser peligrosa para usted o para otras personas en eve de vértigo.  Si tiene dificultad para caminar o mantener el equilibrio, use un bastón para mantener la estabilidad. Si se siente mareado o inestable, siéntese de inmediato.  Retome tina actividades normales según lo indicado por el médico. Pregúntele al médico qué actividades son seguras para usted.  Instrucciones generales    Use los medicamentos de venta cata y los recetados solamente leticia se lo haya indicado el médico.  Dyana suficiente líquido leticia para mantener la orina de color amarillo pálido.  Concurra a todas las visitas de seguimiento. [place] es importante.  Comuníquese con un médico si:  Tiene fiebre.  Contreras afección empeora o presenta síntomas nuevos.  Tina familiares o amigos advierten cambios en contreras comportamiento.  Tiene náuseas o vómitos que empeoran.  Tiene adormecimiento o sensación de pinchazos y hormigueo.  Busque ayuda de inmediato si:  Tiene dificultad para hablar o para moverse.  Siempre está mareado o se desmaya.  Presenta chencho de saige intensos.  Tiene debilidad en los brazos o las piernas.  Presenta cambios en la audición o la visión.  Presenta rigidez en el aysha.  Presenta sensibilidad a la warren.  Estos síntomas pueden representar un problema grave que constituye ketty emergencia. No espere a lillian si los síntomas desaparecen. Solicite atención médica de inmediato. Comuníquese con el servicio de emergencias de contreras localidad (911 en los [place]). No conduzca por tina propios medios hasta el hospital.    Resumen  El vértigo es la sensación de que usted o todo lo que lo rodea se mueve cuando en realidad eso no sucede. El vértigo posicional cookie es el tipo de vértigo más común.  La causa de esta afección es el desplazamiento de los maximus de calcio del oído interno. [place] produce ketty alteración en ketty goyo del oído interno que ayuda al cerebro a percibir el movimiento y el equilibrio.  Los síntomas incluyen pérdida del equilibrio y caídas, sensación de que usted o contreras entorno se mueve, náuseas y vómitos, y visión borrosa.  Esta afección se puede diagnosticar en función de los síntomas, un examen físico y pruebas de posicionamiento.  Siga las instrucciones de seguridad que le haya dado el médico y vaya a todas las visitas de seguimiento. [place] es importante.  Esta información no tiene leticia fin reemplazar el consejo del médico. Asegúrese de hacerle al médico cualquier pregunta que tenga.

## 2024-07-22 NOTE — CHART NOTE - NSCHARTNOTEFT_GEN_A_CORE
Stroke code called at 1630 in triage for dizziness. ED attending cancelled code, pt with intermittent dizziness for 3 weeks, worse today, however worsened on movement, no ataxia reported on exam.     Please call back neuro [phone] for any neurologic concern

## 2024-07-22 NOTE — ASSESSMENT
[FreeTextEntry1] : ## Complete heart block s/p DC-PPM s/p Insulation failure - Revision of Removal+ new lead implants (SJM, 22, Dep)  - Device interrogation normal. I interrogated and reprogrammed the device as described above.  - The only event of significance is 18 seconds of AF. Due to patient's dizziness and changes in vision, I recommend that patient go to emergency room for evaluation. Can continue to monitor for longer episodes of AF.  - Consider turning off V autocap if no emergent cause of dizziness is found.  - Patient is on remote, however he is not transmitting since 11/2023. I made daughter aware and asked her to make sure the monitor is plugged in at home. - Patient refuses EMS and would prefer daughter to bring him to the ER. She says she will bring him today.  - Return in 4-6 months

## 2024-07-22 NOTE — ED PROVIDER NOTE - CHILD ABUSE FACILITY
increased work of breathing and clear to auscultation, no wheezing  CARDIOVASCULAR:  irregular rate, irregular rhythm with no murmurs, gallops, rubs, or abnormal heart sounds, normal PMI. The apical impulses not displaced  JVP less than 8 cm H2O  The carotid upstroke is normal in amplitude and contour without delay or bruit  JVP is not elevated  ABDOMEN:  Normal bowel sounds, non-distended and non-tender to palpation  EXT: no edema, no calf tenderness. Pulses are present bilaterally. DATA:    Lab Results   Component Value Date    ALT 15 01/07/2016    AST 21 01/07/2016    ALKPHOS 65 01/07/2016    BILITOT 0.4 01/07/2016     Lab Results   Component Value Date    CREATININE 0.7 06/21/2017    BUN 15 06/21/2017     06/21/2017    K 4.2 06/21/2017     06/21/2017    CO2 27 06/21/2017     Lab Results   Component Value Date    TSH 2.03 09/10/2012     Lab Results   Component Value Date    WBC 4.2 06/21/2017    HGB 12.1 06/21/2017    HCT 37.7 06/21/2017    MCV 91.1 06/21/2017     06/21/2017     No components found for: CHLPL  Lab Results   Component Value Date    TRIG 65 02/16/2017    TRIG 91 06/03/2016    TRIG 80 03/19/2015     Lab Results   Component Value Date    HDL 73 (H) 02/16/2017    HDL 63 (H) 06/03/2016    HDL 63 (H) 03/19/2015     Lab Results   Component Value Date    LDLCALC 51 02/16/2017    LDLCALC 45 06/03/2016    LDLCALC 37 03/19/2015     Lab Results   Component Value Date    LABVLDL 13 02/16/2017    LABVLDL 18 06/03/2016    LABVLDL 16 03/19/2015 8/25/16 EKG  A Fib - HR 94    CATH 4/24/12  1. Remote, non-dominant right coronary artery occlusion with   collateralization. 2. No significant disease along the left coronary artery. 3. Normal left ventricular systolic function. EF 55%  4. No evidence for aortic dissection. Cath 1/2012. [facility] RCA small nondominant 100%. Nonobstructive LAD/LCx.     ECHO from 4/12 showed Normal LV systolic function. EF 55%.  Mild aortic valve [facility]

## 2024-07-22 NOTE — REVIEW OF SYSTEMS
[Dizziness] : dizziness [Negative] : Heme/Lymph [FreeTextEntry3] : tunnel vision, "things that are close are far and things that are far are close" [FreeTextEntry5] : per HPI

## 2024-07-22 NOTE — ED PROVIDER NOTE - CLINICAL SUMMARY MEDICAL DECISION MAKING FREE TEXT BOX
Patient presented with worsening dizziness described as vertigo, worse with head movements. Otherwise on arrival patient afebrile, HD stable, fully neurovascularly intact, NIHSS 0. Obtained labs which were grossly unremarkable including no significant leukocytosis, anemia, signs of dehydration/BRIANNE, transaminitis or significant electrolyte abnormalities. CT head negative for emergent intracranial pathologies. Symptoms completely resolved after treatment in the ED and patient ambulatory with steady gait. Given the above, will discharge home with outpatient follow up. Patient agreeable with plan. Agrees to return to ED for any new or worsening symptoms.

## 2024-07-22 NOTE — ED PROVIDER NOTE - PROGRESS NOTE DETAILS
Colt Bermudez, DO: feels improved. ambulates steady gait. Patient is well appearing, NAD, afebrile, hemodynamically stable. Any available tests and studies were discussed with patient and/or family. Discharged with instructions in further symptomatic care, return precautions, and need for PMD f/u.

## 2024-07-22 NOTE — HISTORY OF PRESENT ILLNESS
[de-identified] : The patient noted the following symptom(s):. 77 y/o male who was admitted to [facility] for symptomatic bradycardia/ 2:1 AVB underwent a DC PPM on 7/6/2021  He came to the office for urgent visit due to feeling dizzy and having changes in vision for "months", however symptoms are worsening. He has not been seen in 2 years. He has not had blood work done in a long time.  Accompanied by daughter, who has assisted with translation. Patient speaks [place]ian.    Cardiac tests:  EKG (6/8): SR-@89 EKG (3/9/22): SR @@70 ECG (2/17/2022): SR,  paced  ECG( 10/13/2021); SR at 79 bpm, V paced  ECG (7/13/2021): NSR at 79 BPM, V paced.  ECHO (7/6/2021): Normal LVF, trace MR, Mild TR/AR

## 2024-07-22 NOTE — ED PROVIDER NOTE - PHYSICAL EXAMINATION
Initial vital signs reviewed.  General: NAD, nontoxic appearing.  HENT: AT/NC.  Eyes: non-injected conjunctivae b/l. PERRLA b/l. EOMI b/l without nystagmus.  Neck: supple. No nuchal rigidity. Full aROM. C-spine without midline tenderness or stepoffs.  CV: RRR, no murmurs. 2+ distal pulses x4.  Pulm: nonlabored work of breathing, CTAB.  Abd: soft, nondistended, nontender.  MSK: no joint deformity.  Skin: warm, dry, well-perfused.  Neuro: A&Ox4. CN2-12 intact. No dysmetria with finger-to-nose. No dysdiadochokinesia with TRUDY. Sensation to light touch diffusely. 5/5 str b/l UE and LE. No pronator drift.  Psych: appropriate mood and affect.

## 2024-07-22 NOTE — ED ADULT NURSE NOTE - NSFALLHARMRISKINTERV_ED_ALL_ED

## 2024-07-22 NOTE — PROCEDURE
[Complete Heart Block] : complete heart block [See Device Printout] : See device printout [Pacemaker] : pacemaker [DDDR] : DDDR [Voltage: ___ volts] : Voltage was [unfilled] volts [Magnet Rate: ___ Ppm] : magnet rate was [unfilled] Ppm [Normal] : The battery status is normal. [Lead Imp:  ___ohms] : lead impedance was [unfilled] ohms [Sensing Amplitude ___mv] : sensing amplitude was [unfilled] mv [___V @] : [unfilled] V [___ ms] : [unfilled] ms [de-identified] : [place] [de-identified] : [id number] [de-identified] : [id number] [de-identified] : 07/06/2021 [de-identified] : 55/130 [de-identified] : 7.1 Years [de-identified] : AP: 1.4% : 95% 22 AMS episodes, some are AT, some are AFib 18 seconds is longest episode

## 2024-07-22 NOTE — PHYSICAL EXAM
[General Appearance - Well Developed] : well developed [Normal Appearance] : normal appearance [Well Groomed] : well groomed [General Appearance - Well Nourished] : well nourished [No Deformities] : no deformities [General Appearance - In No Acute Distress] : no acute distress [Heart Rate And Rhythm] : heart rate and rhythm were normal [Heart Sounds] : normal S1 and S2 [Murmurs] : no murmurs present [Arterial Pulses Normal] : the arterial pulses were normal [] : no respiratory distress [Respiration, Rhythm And Depth] : normal respiratory rhythm and effort [Exaggerated Use Of Accessory Muscles For Inspiration] : no accessory muscle use [Auscultation Breath Sounds / Voice Sounds] : lungs were clear to auscultation bilaterally [Well-Healed] : well-healed [Abdomen Soft] : soft [Nail Clubbing] : no clubbing of the fingernails [Cyanosis, Localized] : no localized cyanosis

## 2024-07-22 NOTE — CHART NOTE - NSCHARTNOTEFT_GEN_A_CORE
PT seen in crit as possible stroke  c/o 3 weeks of intermittant dizziness increased today  worse w/ standing and improves after standing for a while  Saw cards today because of symptoms but not improved and to ED  no focal weakness, no slurred speech, no change in vision    exam  initially dizzy w/ standing then resolves  gait stable w/o assistance  from X 4  strength 5/5 X 4  no slurred speech  no facial droop  no field cut    Stroke code cancelled

## 2024-07-22 NOTE — ED PROVIDER NOTE - DIFFERENTIAL DIAGNOSIS
Differential Diagnosis Dizziness r/o acute intracranial pathology vs dehydration vs anemia vs electrolyte abnormality vs other metabolic derangement.

## 2024-07-22 NOTE — ED PROVIDER NOTE - PATIENT PORTAL LINK FT
You can access the FollowMyHealth Patient Portal offered by [facility] by registering at the following website: [email or web address]. By joining [facility] FollowMyHealth portal, you will also be able to view your health information using other applications (apps) compatible with our system.

## 2024-07-22 NOTE — ED PROVIDER NOTE - OBJECTIVE STATEMENT
78yoM with PMHx HLD, BPH, symptomatic bradycardia s/p PPM (07/06/2021) followed by Dr. Olivia's, who presents for 3 weeks of room spinning dizziness.  Was initially seen by cardiology office and referred here for further evaluation.  Patient notices dizziness particularly when he gets from a sitting to standing position often with difficulty ambulating shortly afterwards.  However after prolonged periods of standing he does not have any dizziness and is able to ambulate and move around at his baseline.  Has mild intermittent blurry vision associated with this.  Reports nausea, vomiting.  Denies any numbness, tingling, weakness, fevers, chills, neck stiffness or pain, chest pain, shortness of breath, lightheadedness. no hx of similar.

## 2024-07-29 NOTE — PATIENT PROFILE ADULT - OVER THE PAST TWO WEEKS HAVE YOU FELT DOWN, DEPRESSED OR HOPELESS?
Mr. Velazquez wants to make sure that Dr. Kaba is aware that he has an appointment in [place] for them to determine if his valves need to be removed. His appointment is with them on 08/06/2024 at 10:00 am. He would like Dr. Kaba's opinion on this.   no

## 2024-09-25 NOTE — H&P ADULT - NSICDXPASTMEDICALHX_GEN_ALL_CORE_FT
PAST MEDICAL HISTORY:  Acute depression     Arrhythmia     Chronic low back pain with sciatica     History of hyperlipidemia     HTN (hypertension)     
written material

## 2025-05-06 ENCOUNTER — APPOINTMENT (OUTPATIENT)
Dept: CARDIOLOGY | Facility: CLINIC | Age: 80
End: 2025-05-06
Payer: MEDICAID

## 2025-05-06 ENCOUNTER — NON-APPOINTMENT (OUTPATIENT)
Age: 80
End: 2025-05-06

## 2025-05-06 PROCEDURE — 93294 REM INTERROG EVL PM/LDLS PM: CPT

## 2025-05-06 PROCEDURE — 93296 REM INTERROG EVL PM/IDS: CPT | Mod: NC

## 2025-05-10 ENCOUNTER — EMERGENCY (EMERGENCY)
Facility: HOSPITAL | Age: 80
LOS: 0 days | Discharge: HOME | End: 2025-05-10
Attending: EMERGENCY MEDICINE
Payer: MEDICAID

## 2025-05-10 VITALS
TEMPERATURE: 98 F | SYSTOLIC BLOOD PRESSURE: 129 MMHG | WEIGHT: 160.06 LBS | OXYGEN SATURATION: 95 % | HEIGHT: 65 IN | DIASTOLIC BLOOD PRESSURE: 75 MMHG | HEART RATE: 97 BPM | RESPIRATION RATE: 18 BRPM

## 2025-05-10 DIAGNOSIS — Z87.828 PERSONAL HISTORY OF OTHER (HEALED) PHYSICAL INJURY AND TRAUMA: Chronic | ICD-10-CM

## 2025-05-10 PROCEDURE — 99284 EMERGENCY DEPT VISIT MOD MDM: CPT

## 2025-05-10 PROCEDURE — 73030 X-RAY EXAM OF SHOULDER: CPT | Mod: 26,RT

## 2025-05-10 PROCEDURE — 73030 X-RAY EXAM OF SHOULDER: CPT | Mod: RT

## 2025-05-10 PROCEDURE — 99283 EMERGENCY DEPT VISIT LOW MDM: CPT | Mod: 25

## 2025-05-10 RX ORDER — ACETAMINOPHEN 500 MG/5ML
975 LIQUID (ML) ORAL ONCE
Refills: 0 | Status: COMPLETED | OUTPATIENT
Start: 2025-05-10 | End: 2025-05-10

## 2025-05-10 RX ORDER — IBUPROFEN 200 MG
600 TABLET ORAL ONCE
Refills: 0 | Status: COMPLETED | OUTPATIENT
Start: 2025-05-10 | End: 2025-05-10

## 2025-05-10 RX ADMIN — Medication 975 MILLIGRAM(S): at 09:49

## 2025-05-10 RX ADMIN — Medication 600 MILLIGRAM(S): at 08:38

## 2025-05-10 NOTE — ED PROVIDER NOTE - INTERPRETATION SERVICES DECLINED
Debbie Simpson, LPN    Licensed Practical Nurse      Telephone Encounter      Signed   Encounter Date:  11/8/2022                 Signed                                                                                                                                                                  Patient's chart reviewed, please contact to schedule OA colonoscopy with .Patient Preference (pt does have established care with Dr. Flynn in the past, can offer if pt wants to go with Dr. Flynn)        Schedule Procedure:   Please Schedule Routine (next available or patient preference)  Procedure: Colonoscopy (45640) with MD preference for bowel prep.*No Suprep - No recent labs to determine current kidney function*   Diagnosis: Colon Cancer Screening Z12.11  Is patient:             Diabetic? No             ANTIPLATELET / ANTICOAGULATION: MEDICATION:  None  Latex allergy: No  Sleep apnea: No  Location: Patient Preference  Sedation: IV Anesthesia     Special Instructions: Pt has hx of chronic constipation        Covid: Fully Vaccinated  Yes  Immunocompromised:  No                     Patient/Caregiver requests family/friend to interpret.

## 2025-05-10 NOTE — ED PROVIDER NOTE - CLINICAL SUMMARY MEDICAL DECISION MAKING FREE TEXT BOX
Patient presented with R shoulder pain s/p fall 2 days ago as documented. (+) tender around shoulder but otherwise afebrile, HD stable, full ROM all joints, no c-spine tenderness, no other external signs of trauma on exam, no head trauma. Xray of shoulder obtained and negative for acute traumatic injury and pain otherwise controlled in the ED. Patient ambulatory. Given the above, will discharge home with outpatient follow up. Patient agreeable with plan. Agrees to return to ED for any new or worsening symptoms.

## 2025-05-10 NOTE — ED PROVIDER NOTE - NSFOLLOWUPINSTRUCTIONS_ED_ALL_ED_FT
Por favor, consulte con contreras médico de atención primaria para ketty evaluación adicional.    Dolor de hombro    CUIDADO AMBULATORIO:    El dolor de hombroes un problema común que puede afectar erik actividades diarias. El dolor puede ser causado por un problema en contreras hombro, leticia la irritación de un tendón o bursa. Los tendones son cuerdas de tejido resistente que conectan los músculos a los huesos. La bursa es ketty bolsa (saco) llena de líquido que actúa leticia colchón entre un hueso y un tendón. El dolor de hombro también puede ser causado por el dolor que se propaga hacia contreras hombro de otra parte de contreras cuerpo.  Anatomía del hombro    Busque atención médica de inmediato si:    Usted tiene dolor intenso.    Usted no puede  contreras brazo ni contreras hombro.    Usted tiene entumecimiento u hormigueo en el hombro o en el brazo.  Comuníquese con contreras médico si:    Contreras dolor empeora o no desaparece con el tratamiento.    Usted tiene dificultad para  el brazo o el hombro.    Usted tiene preguntas o inquietudes acerca de contreras condición o cuidado.  El tratamiento para el dolor de hombro:podría incluir cualquiera de los siguientes:    Acetaminofénalivia el dolor y baja la fiebre. Está disponible sin receta médica. Pregunte la cantidad y la frecuencia con que debe tomarlos. Siga las indicaciones. Farrah las etiquetas de todos los demás medicamentos que esté usando para saber si también contienen acetaminofén, o pregunte a contreras médico o farmacéutico. El acetaminofén puede causar daño en el hígado cuando no se maria del rosario de forma correcta.    AINEcomo el ibuprofeno, ayudan a disminuir la inflamación, el dolor y la fiebre. Adrienne medicamento está disponible con o sin ketty receta médica. Los ALICIA pueden causar sangrado estomacal o problemas renales en ciertas personas. Si usted maria del rosario un medicamento anticoagulante, siempre pregúntele a contreras médico si los ALICIA son seguros para usted. Siempre farrah la etiqueta de adrienne medicamento y siga las instrucciones.    Ketty inyección de esteroidespodrían ayudar a reducir el dolor y la inflamación.    La cirugíapuede ser necesaria para un dolor a frances plazo y por la pérdida de función.  El manejo de erik síntomas:    Aplique hieloen el hombro de 20 a 30 minutos cada 2 horas o leticia se lo indiquen. Use ketty compresa de hielo o ponga hielo triturado en ketty bolsa de plástico. Cúbrala con ketty toalla antes de aplicarla sobre el hombro. El hielo ayuda a evitar daño al tejido y a disminuir la inflamación y el dolor.    Use calor si el hielo no le está ayudando con erik síntomas.Aplique calor sobre el hombro tonya 20 a 30 minutos cada 2 horas tonya tantos días leticia le indiquen. El calor ayuda a disminuir el dolor y los espasmos musculares.    Limite las actividades leticia se le indique.Trate de evitar movimientos repetidos arriba de la saige.    Asista a terapia física u ocupacional leticia se le indique.Un fisioterapeuta le puede enseñar ejercicios para ayudarle a mejorar el movimiento y la fuerza, y para disminuir el dolor. Un terapeuta ocupacional le enseña habilidades para ayudarlo con erik actividades diarias.  Prevenga el dolor de hombro:    Mantenga un buen rango de movimiento en el hombro.Pregúntele a contreras médico qué ejercicios debe hacer de forma regular después de jasmin sanado.    Haciendo ejercicios de estiramiento y fortalecimiento para contreras hombro.Use la técnica apropiada tonya los ejercicios y deportes.  Latasha ktety hakeem de seguimiento con contreras médico u ortopedista leticia se le indique:Anote erik preguntas para que se acuerde de hacerlas tonya erik visitas.

## 2025-05-10 NOTE — ED ADULT NURSE NOTE - NSFALLUNIVINTERV_ED_ALL_ED
Assistance with ambulation/Monitor for mental status changes and reorient to person, place, and time, as needed/Reinforce activity limits and safety measures with patient and family/Use of alarms - bed, stretcher, chair and/or video monitoring/Bed/Stretcher in lowest position, wheels locked, appropriate side rails in place/Call bell, personal items and telephone in reach/Instruct patient to call for assistance before getting out of bed/chair/stretcher/Non-slip footwear applied when patient is off stretcher/[place] to call system/Physically safe environment - no spills, clutter or unnecessary equipment/Purposeful proactive rounding/Room/bathroom lighting operational, light cord in reach

## 2025-05-10 NOTE — ED PROVIDER NOTE - PHYSICAL EXAMINATION
VITAL SIGNS: I have reviewed nursing notes and confirm.  CONSTITUTIONAL: Well-appearing, non-toxic, in NAD  SKIN: Warm dry, normal skin turgor  CARD: RRR, no murmurs, rubs or gallops  RESP: Clear to ausculation bilaterally.  No rales, rhonchi, or wheezing.  ABD: Soft, non-distended, non-tender, no rebound or guarding. No CVA tenderness  EXT: Tenderness to palpation right shoulder. Full ROM bilateral UE. Able to touch left shoulder with right hand.

## 2025-05-10 NOTE — ED PROVIDER NOTE - OBJECTIVE STATEMENT
79-year-old male past medical history of arrhythmia, hypertension, sciatica, hyperlipidemia presenting to the ED for right shoulder pain radiating to the right sided neck after fall 2 days ago.  Denies head trauma, LOC, anticoagulants.  Patient states since the fall he has been having shoulder pain and has not taken anything for the pain.  Denies fever, chills, nausea, vomiting, decreased range of motion, numbness/tingling, weakness

## 2025-05-10 NOTE — ED PROVIDER NOTE - PATIENT PORTAL LINK FT
You can access the FollowMyHealth Patient Portal offered by [facility] by registering at the following website: [email or web address]. By joining [employer]’s FollowMyHealth portal, you will also be able to view your health information using other applications (apps) compatible with our system.

## 2025-05-10 NOTE — ED PROVIDER NOTE - RELATIONSHIP TO PATIENT
Stable with current medication.  Avoid large or spicy meals.  Avoid reclining for at least 2 hours after meals.   Son

## 2025-08-05 ENCOUNTER — NON-APPOINTMENT (OUTPATIENT)
Age: 80
End: 2025-08-05

## 2025-08-05 ENCOUNTER — APPOINTMENT (OUTPATIENT)
Dept: CARDIOLOGY | Facility: CLINIC | Age: 80
End: 2025-08-05
Payer: MEDICAID

## 2025-08-05 PROCEDURE — 93294 REM INTERROG EVL PM/LDLS PM: CPT

## 2025-08-05 PROCEDURE — 93296 REM INTERROG EVL PM/IDS: CPT | Mod: NC
